# Patient Record
Sex: MALE | Race: WHITE | NOT HISPANIC OR LATINO | Employment: OTHER | URBAN - METROPOLITAN AREA
[De-identification: names, ages, dates, MRNs, and addresses within clinical notes are randomized per-mention and may not be internally consistent; named-entity substitution may affect disease eponyms.]

---

## 2018-05-17 ENCOUNTER — OFFICE VISIT (OUTPATIENT)
Dept: INTERNAL MEDICINE | Facility: CLINIC | Age: 68
End: 2018-05-17

## 2018-05-17 VITALS
DIASTOLIC BLOOD PRESSURE: 100 MMHG | OXYGEN SATURATION: 98 % | HEART RATE: 93 BPM | SYSTOLIC BLOOD PRESSURE: 138 MMHG | WEIGHT: 151 LBS

## 2018-05-17 DIAGNOSIS — Z00.00 ANNUAL PHYSICAL EXAM: ICD-10-CM

## 2018-05-17 DIAGNOSIS — F10.20 ALCOHOLISM (HCC): Chronic | ICD-10-CM

## 2018-05-17 DIAGNOSIS — M48.062 SPINAL STENOSIS OF LUMBAR REGION WITH NEUROGENIC CLAUDICATION: ICD-10-CM

## 2018-05-17 DIAGNOSIS — I10 ESSENTIAL HYPERTENSION: Primary | ICD-10-CM

## 2018-05-17 PROCEDURE — 99214 OFFICE O/P EST MOD 30 MIN: CPT | Performed by: INTERNAL MEDICINE

## 2018-05-17 RX ORDER — LISINOPRIL 40 MG/1
40 TABLET ORAL
COMMUNITY
Start: 2017-08-14 | End: 2018-05-18 | Stop reason: SDUPTHER

## 2018-05-17 RX ORDER — AMLODIPINE BESYLATE 10 MG/1
10 TABLET ORAL
COMMUNITY
Start: 2018-02-01 | End: 2018-05-18 | Stop reason: SDUPTHER

## 2018-05-17 NOTE — PROGRESS NOTES
Subjective   Jose Villafuerte is a 68 y.o. male.     History of Present Illness     The following portions of the patient's history were reviewed and updated as appropriate: allergies, current medications, past family history, past medical history, past social history, past surgical history and problem list.  67 yo/m with alcaholism, HTN, former smoker, with chronic low back pain here for an introductory PE. He is still drinking 12 beers per day but he has quit smoking.  Review of Systems   Constitutional: Negative.    HENT: Negative.    Eyes: Negative.    Respiratory: Negative.    Cardiovascular: Negative.    Gastrointestinal: Negative.    Endocrine: Negative.    Genitourinary: Negative.    Musculoskeletal: Positive for arthralgias and back pain.   Skin: Negative.    Allergic/Immunologic: Negative.    Neurological: Negative.    Hematological: Negative.    Psychiatric/Behavioral: Negative.        Objective   Physical Exam   Constitutional: He is oriented to person, place, and time. He appears well-developed and well-nourished.   HENT:   Head: Normocephalic and atraumatic.   Eyes: EOM are normal. Pupils are equal, round, and reactive to light.   Neck: Normal range of motion. Neck supple.   Cardiovascular: Normal rate, regular rhythm and normal heart sounds.    Pulmonary/Chest: Effort normal and breath sounds normal.   Abdominal: Soft. Bowel sounds are normal.   Musculoskeletal:   Chronic low back pain with loss of lumbar curvature   Neurological: He is alert and oriented to person, place, and time.   Skin: Skin is warm and dry.   Psychiatric: He has a normal mood and affect. His behavior is normal. Judgment and thought content normal.   Nursing note and vitals reviewed.        Assessment/Plan   Jose was seen today for establish care.    Diagnoses and all orders for this visit:    Essential hypertension  Comments:  poorly controlled at present, will consider adjusting medication    Spinal stenosis of lumbar region  with neurogenic claudication  Comments:  working with a neurosurgeon    Cigarette nicotine dependence without complication  Comments:  unmotivated to quit    Alcoholism  Comments:  drinking less    Annual physical exam  -     Comprehensive Metabolic Panel; Future  -     CBC & Differential; Future  -     Lipid Panel; Future  -     TSH; Future

## 2018-05-18 ENCOUNTER — TELEPHONE (OUTPATIENT)
Dept: INTERNAL MEDICINE | Facility: CLINIC | Age: 68
End: 2018-05-18

## 2018-05-18 DIAGNOSIS — I10 ESSENTIAL HYPERTENSION: Primary | ICD-10-CM

## 2018-05-18 RX ORDER — LISINOPRIL 40 MG/1
40 TABLET ORAL DAILY
Qty: 30 TABLET | Refills: 3 | Status: SHIPPED | OUTPATIENT
Start: 2018-05-18 | End: 2018-08-17 | Stop reason: SDUPTHER

## 2018-05-18 RX ORDER — AMLODIPINE BESYLATE 10 MG/1
10 TABLET ORAL DAILY
Qty: 30 TABLET | Refills: 3 | Status: SHIPPED | OUTPATIENT
Start: 2018-05-18 | End: 2018-11-14

## 2018-05-18 NOTE — TELEPHONE ENCOUNTER
----- Message from Adrienne Andujar sent at 5/18/2018  1:37 PM EDT -----  Contact: Patient  Patient requesting refill on    lisinopril (PRINIVIL,ZESTRIL) 40 MG tablet  amLODIPine (NORVASC) 10 MG tablet    Patient:[phone]    Pharmacy:[place] [address] [address] AT [facility] [address] [facility] - [phone] [facility] [phone]

## 2018-05-23 ENCOUNTER — LAB (OUTPATIENT)
Dept: INTERNAL MEDICINE | Facility: CLINIC | Age: 68
End: 2018-05-23

## 2018-05-23 DIAGNOSIS — Z00.00 ANNUAL PHYSICAL EXAM: ICD-10-CM

## 2018-05-23 LAB
ALBUMIN SERPL-MCNC: 4.4 G/DL (ref 3.5–5.2)
ALBUMIN/GLOB SERPL: 1.3 G/DL
ALP SERPL-CCNC: 80 U/L (ref 39–117)
ALT SERPL W P-5'-P-CCNC: 29 U/L (ref 1–41)
ANION GAP SERPL CALCULATED.3IONS-SCNC: 15.4 MMOL/L
AST SERPL-CCNC: 26 U/L (ref 1–40)
BASOPHILS # BLD AUTO: 0.02 10*3/MM3 (ref 0–0.2)
BASOPHILS NFR BLD AUTO: 0.2 % (ref 0–2)
BILIRUB SERPL-MCNC: 0.5 MG/DL (ref 0.1–1.2)
BUN BLD-MCNC: 8 MG/DL (ref 8–23)
BUN/CREAT SERPL: 12.3 (ref 7–25)
CALCIUM SPEC-SCNC: 9.5 MG/DL (ref 8.6–10.5)
CHLORIDE SERPL-SCNC: 92 MMOL/L (ref 98–107)
CHOLEST SERPL-MCNC: 192 MG/DL (ref 0–200)
CO2 SERPL-SCNC: 23.6 MMOL/L (ref 22–29)
CREAT BLD-MCNC: 0.65 MG/DL (ref 0.76–1.27)
DEPRECATED RDW RBC AUTO: 44.2 FL (ref 37–54)
EOSINOPHIL # BLD AUTO: 0.05 10*3/MM3 (ref 0–0.7)
EOSINOPHIL NFR BLD AUTO: 0.5 % (ref 0–5)
ERYTHROCYTE [DISTWIDTH] IN BLOOD BY AUTOMATED COUNT: 12.4 % (ref 11.5–15)
GFR SERPL CREATININE-BSD FRML MDRD: 122 ML/MIN/1.73
GLOBULIN UR ELPH-MCNC: 3.3 GM/DL
GLUCOSE BLD-MCNC: 79 MG/DL (ref 65–99)
HCT VFR BLD AUTO: 38.1 % (ref 40.1–51)
HDLC SERPL-MCNC: 81 MG/DL (ref 40–60)
HGB BLD-MCNC: 13.5 G/DL (ref 13.7–17.5)
LDLC SERPL CALC-MCNC: 101 MG/DL (ref 0–100)
LDLC/HDLC SERPL: 1.25 {RATIO}
LYMPHOCYTES # BLD AUTO: 1.64 10*3/MM3 (ref 0.8–7)
LYMPHOCYTES NFR BLD AUTO: 14.9 % (ref 10–60)
MCH RBC QN AUTO: 35.7 PG (ref 26–34)
MCHC RBC AUTO-ENTMCNC: 35.4 G/DL (ref 31–37)
MCV RBC AUTO: 100.8 FL (ref 80–100)
MONOCYTES # BLD AUTO: 1.45 10*3/MM3 (ref 0–1)
MONOCYTES NFR BLD AUTO: 13.1 % (ref 0–13)
NEUTROPHILS # BLD AUTO: 7.88 10*3/MM3 (ref 1–11)
NEUTROPHILS NFR BLD AUTO: 71.3 % (ref 30–85)
PLATELET # BLD AUTO: 388 10*3/MM3 (ref 150–450)
PMV BLD AUTO: 9.2 FL (ref 6–12)
POTASSIUM BLD-SCNC: 4.3 MMOL/L (ref 3.5–5.2)
PROT SERPL-MCNC: 7.7 G/DL (ref 6–8.5)
RBC # BLD AUTO: 3.78 10*6/MM3 (ref 4.63–6.08)
SODIUM BLD-SCNC: 131 MMOL/L (ref 136–145)
TRIGL SERPL-MCNC: 48 MG/DL (ref 0–150)
TSH SERPL-ACNC: 2.8 MIU/ML (ref 0.27–4.2)
VLDLC SERPL-MCNC: 9.6 MG/DL (ref 5–40)
WBC NRBC COR # BLD: 11.04 10*3/MM3 (ref 5–10)

## 2018-05-23 PROCEDURE — 80053 COMPREHEN METABOLIC PANEL: CPT | Performed by: INTERNAL MEDICINE

## 2018-05-23 PROCEDURE — 85025 COMPLETE CBC W/AUTO DIFF WBC: CPT | Performed by: INTERNAL MEDICINE

## 2018-05-23 PROCEDURE — 80061 LIPID PANEL: CPT | Performed by: INTERNAL MEDICINE

## 2018-05-24 DIAGNOSIS — E87.1 HYPONATREMIA: Primary | ICD-10-CM

## 2018-07-10 ENCOUNTER — LAB (OUTPATIENT)
Dept: INTERNAL MEDICINE | Facility: CLINIC | Age: 68
End: 2018-07-10

## 2018-07-10 DIAGNOSIS — E87.1 HYPONATREMIA: ICD-10-CM

## 2018-07-10 LAB
ALBUMIN SERPL-MCNC: 4.7 G/DL (ref 3.5–5.2)
ALBUMIN/GLOB SERPL: 1.6 G/DL
ALP SERPL-CCNC: 90 U/L (ref 39–117)
ALT SERPL-CCNC: 32 U/L (ref 1–41)
AST SERPL-CCNC: 38 U/L (ref 1–40)
BILIRUB SERPL-MCNC: 0.3 MG/DL (ref 0.1–1.2)
BUN SERPL-MCNC: 5 MG/DL (ref 8–23)
BUN/CREAT SERPL: 8.3 (ref 7–25)
CALCIUM SERPL-MCNC: 8.9 MG/DL (ref 8.6–10.5)
CHLORIDE SERPL-SCNC: 93 MMOL/L (ref 98–107)
CO2 SERPL-SCNC: 21.6 MMOL/L (ref 22–29)
CREAT SERPL-MCNC: 0.6 MG/DL (ref 0.76–1.27)
GLOBULIN SER CALC-MCNC: 3 GM/DL
GLUCOSE SERPL-MCNC: 116 MG/DL (ref 65–99)
POTASSIUM SERPL-SCNC: 4.2 MMOL/L (ref 3.5–5.2)
PROT SERPL-MCNC: 7.7 G/DL (ref 6–8.5)
SODIUM SERPL-SCNC: 132 MMOL/L (ref 136–145)

## 2018-08-17 DIAGNOSIS — I10 ESSENTIAL HYPERTENSION: ICD-10-CM

## 2018-08-17 RX ORDER — LISINOPRIL 40 MG/1
TABLET ORAL
Qty: 90 TABLET | Refills: 2 | Status: SHIPPED | OUTPATIENT
Start: 2018-08-17 | End: 2018-11-16 | Stop reason: SDUPTHER

## 2018-11-16 ENCOUNTER — OFFICE VISIT (OUTPATIENT)
Dept: INTERNAL MEDICINE | Facility: CLINIC | Age: 68
End: 2018-11-16

## 2018-11-16 VITALS
HEIGHT: 68 IN | BODY MASS INDEX: 23.79 KG/M2 | WEIGHT: 157 LBS | DIASTOLIC BLOOD PRESSURE: 68 MMHG | SYSTOLIC BLOOD PRESSURE: 108 MMHG

## 2018-11-16 DIAGNOSIS — F10.20 ALCOHOLISM (HCC): Primary | ICD-10-CM

## 2018-11-16 DIAGNOSIS — M48.062 SPINAL STENOSIS OF LUMBAR REGION WITH NEUROGENIC CLAUDICATION: ICD-10-CM

## 2018-11-16 DIAGNOSIS — I10 ESSENTIAL HYPERTENSION: ICD-10-CM

## 2018-11-16 DIAGNOSIS — F17.210 CIGARETTE NICOTINE DEPENDENCE WITHOUT COMPLICATION: ICD-10-CM

## 2018-11-16 PROCEDURE — 99214 OFFICE O/P EST MOD 30 MIN: CPT | Performed by: INTERNAL MEDICINE

## 2018-11-16 RX ORDER — ERYTHROMYCIN 5 MG/G
OINTMENT OPHTHALMIC AS NEEDED
COMMUNITY
Start: 2018-08-16 | End: 2019-04-23

## 2018-11-16 RX ORDER — AMLODIPINE BESYLATE 10 MG/1
10 TABLET ORAL DAILY
Qty: 90 TABLET | Refills: 3 | Status: SHIPPED | OUTPATIENT
Start: 2018-11-16 | End: 2019-11-25 | Stop reason: SDUPTHER

## 2018-11-16 RX ORDER — AMLODIPINE BESYLATE 10 MG/1
10 TABLET ORAL
COMMUNITY
Start: 2018-02-01 | End: 2018-11-16 | Stop reason: SDUPTHER

## 2018-11-16 RX ORDER — HEPATITIS A VACCINE 1440 [IU]/ML
INJECTION, SUSPENSION INTRAMUSCULAR
COMMUNITY
Start: 2018-10-09 | End: 2018-12-04

## 2018-11-16 RX ORDER — LISINOPRIL 40 MG/1
40 TABLET ORAL DAILY
Qty: 90 TABLET | Refills: 2 | Status: SHIPPED | OUTPATIENT
Start: 2018-11-16 | End: 2019-08-22 | Stop reason: SDUPTHER

## 2018-11-16 NOTE — PROGRESS NOTES
Subjective presents with a chief complaint of alcaholism, HTN, former smoker, with chronic low back pain here for a follow up exam. He is still drinking 12 beers per day but he has quit smoking.  Jose Villafuerte is a 68 y.o. male.     History of Present Illness     The following portions of the patient's history were reviewed and updated as appropriate: allergies, current medications, past family history, past medical history, past social history, past surgical history and problem list.    Review of Systems   Constitutional: Negative.    HENT: Negative.    Eyes: Negative.    Respiratory: Negative.    Cardiovascular: Negative.    Gastrointestinal: Negative.    Endocrine: Negative.    Genitourinary: Negative.    Musculoskeletal: Positive for arthralgias and back pain.   Skin: Negative.    Allergic/Immunologic: Negative.    Neurological: Negative.    Hematological: Negative.    Psychiatric/Behavioral: Negative.        Objective   Physical Exam   Constitutional: He is oriented to person, place, and time. He appears well-developed and well-nourished.   HENT:   Head: Normocephalic and atraumatic.   Eyes: EOM are normal. Pupils are equal, round, and reactive to light.   Neck: Normal range of motion.   Cardiovascular: Normal rate, regular rhythm and normal heart sounds.   Pulmonary/Chest: Effort normal and breath sounds normal.   Abdominal: Soft. Bowel sounds are normal.   Musculoskeletal: Normal range of motion.   Chronic low back pain   Neurological: He is alert and oriented to person, place, and time.   Skin: Skin is warm and dry.   Psychiatric: He has a normal mood and affect. His behavior is normal. Judgment and thought content normal.   Nursing note and vitals reviewed.        Assessment/Plan   Jose was seen today for follow-up and back pain.    Diagnoses and all orders for this visit:    Alcoholism (CMS/[facility])  Comments:  still an active drinker    Essential hypertension  Comments:  no changes  Orders:  -      lisinopril (PRINIVIL,ZESTRIL) 40 MG tablet; Take 1 tablet by mouth Daily.    Spinal stenosis of lumbar region with neurogenic claudication  Comments:  working with pain management    Cigarette nicotine dependence without complication  Comments:  unmotivated to quit    Essential hypertension  -     lisinopril (PRINIVIL,ZESTRIL) 40 MG tablet; Take 1 tablet by mouth Daily.    Other orders  -     amLODIPine (NORVASC) 10 MG tablet; Take 1 tablet by mouth Daily.

## 2018-11-29 ENCOUNTER — TELEPHONE (OUTPATIENT)
Dept: INTERNAL MEDICINE | Facility: CLINIC | Age: 68
End: 2018-11-29

## 2018-11-29 DIAGNOSIS — M54.50 CHRONIC BILATERAL LOW BACK PAIN WITHOUT SCIATICA: Primary | ICD-10-CM

## 2018-11-29 DIAGNOSIS — G89.29 CHRONIC BILATERAL LOW BACK PAIN WITHOUT SCIATICA: Primary | ICD-10-CM

## 2018-12-04 ENCOUNTER — OFFICE VISIT (OUTPATIENT)
Dept: ORTHOPEDIC SURGERY | Facility: CLINIC | Age: 68
End: 2018-12-04

## 2018-12-04 VITALS — WEIGHT: 156 LBS | TEMPERATURE: 98.4 F | BODY MASS INDEX: 23.64 KG/M2 | HEIGHT: 68 IN

## 2018-12-04 DIAGNOSIS — M48.061 SPINAL STENOSIS OF LUMBAR REGION, UNSPECIFIED WHETHER NEUROGENIC CLAUDICATION PRESENT: Primary | ICD-10-CM

## 2018-12-04 PROCEDURE — 99204 OFFICE O/P NEW MOD 45 MIN: CPT | Performed by: ORTHOPAEDIC SURGERY

## 2018-12-04 NOTE — H&P (VIEW-ONLY)
New patient or new problem visit    Chief Complaint   Patient presents with   • Lumbar Spine - Pain, Establish Care       HPI: He complains of pain in the left lower extremity as well as low back pain more leg than back pain.  Ongoing chronically at least a year moderate aching worse with activity.  About a year ago he quit curing his wallet on the left thinking it was from that.  Chiropractic and inversion table has helped along the way but epidural steroids have not.  He quit smoking 3 packs per year 3 years ago and states he no longer smokes he states his balance is okay no fever chills or weight loss recently.    PFSH: See chart- reviewed    Review of Systems   Constitutional: Negative for chills, fever and unexpected weight change.   HENT: Negative for trouble swallowing and voice change.    Eyes: Negative for visual disturbance.   Respiratory: Negative for cough and shortness of breath.    Cardiovascular: Negative for chest pain and leg swelling.   Gastrointestinal: Negative for abdominal pain, nausea and vomiting.   Endocrine: Negative for cold intolerance and heat intolerance.   Genitourinary: Negative for difficulty urinating, frequency and urgency.   Musculoskeletal: Positive for back pain.   Skin: Negative for rash and wound.   Allergic/Immunologic: Negative for immunocompromised state.   Neurological: Positive for numbness. Negative for weakness.   Hematological: Does not bruise/bleed easily.   Psychiatric/Behavioral: Negative for dysphoric mood. The patient is not nervous/anxious.        PE: Constitutional: Vital signs above-noted.  Awake, alert and oriented    Psychiatric: Affect and insight do not appear grossly disturbed.    Pulmonary: Breathing is unlabored, color is good.    Skin: Warm, dry and normal turgor    Cardiac:  pedal pulses intact.  No edema.    Eyesight and hearing appear adequate for examination purposes      Musculoskeletal:  There is minimal tenderness to percussion and palpation of  the spine. Motion appears somewhat stiff.  Posture is unremarkable to coronal and sagittal inspection.    The skin about the area is intact.  There is no palpable or visible deformity.  There is no local spasm.       Neurologic:   Reflexes are present in the right patellae absent on the left.   Motor function is undisturbed in quadriceps, EHL, and gastrocnemius      Sensation appears symmetrically intact to light touch   .  In the bilateral lower extremities there is no evidence of atrophy.   Clonus is absent..  Gait appears undisturbed.  SLR test negative      MEDICAL DECISION MAKING    XRAY: Plain film x-rays the lumbar spine shows slight scoliosis and multilevel disc degeneration.  MRI scan shows some right lumbosacral foraminal stenosis but looks pretty good otherwise I reviewed the radiologist's report with which I agree.    Other: n/a    Impression: Certainly looks like lumbar radiculopathy, likely cause would be stenosis.    Plan: I'm planning myelogram and CT scan of the lumbar spine for further evaluation.  The minimal risk was explained.  I will call him with results

## 2018-12-24 ENCOUNTER — HOSPITAL ENCOUNTER (OUTPATIENT)
Dept: CT IMAGING | Facility: HOSPITAL | Age: 68
Discharge: HOME | End: 2018-12-24
Attending: ORTHOPAEDIC SURGERY | Admitting: ORTHOPAEDIC SURGERY

## 2018-12-24 ENCOUNTER — HOSPITAL ENCOUNTER (OUTPATIENT)
Dept: GENERAL RADIOLOGY | Facility: HOSPITAL | Age: 68
Discharge: HOME | End: 2018-12-24
Attending: ORTHOPAEDIC SURGERY

## 2018-12-24 VITALS
HEART RATE: 78 BPM | RESPIRATION RATE: 16 BRPM | OXYGEN SATURATION: 98 % | BODY MASS INDEX: 22.73 KG/M2 | DIASTOLIC BLOOD PRESSURE: 75 MMHG | SYSTOLIC BLOOD PRESSURE: 129 MMHG | WEIGHT: 150 LBS | TEMPERATURE: 97.1 F | HEIGHT: 68 IN

## 2018-12-24 DIAGNOSIS — M48.061 SPINAL STENOSIS OF LUMBAR REGION, UNSPECIFIED WHETHER NEUROGENIC CLAUDICATION PRESENT: ICD-10-CM

## 2018-12-24 PROCEDURE — 72240 MYELOGRAPHY NECK SPINE: CPT

## 2018-12-24 PROCEDURE — 0 IOPAMIDOL 41 % SOLUTION: Performed by: RADIOLOGY

## 2018-12-24 PROCEDURE — 62304 MYELOGRAPHY LUMBAR INJECTION: CPT

## 2018-12-24 PROCEDURE — 72131 CT LUMBAR SPINE W/O DYE: CPT

## 2018-12-24 RX ORDER — LIDOCAINE HYDROCHLORIDE 10 MG/ML
10 INJECTION, SOLUTION INFILTRATION; PERINEURAL ONCE
Status: COMPLETED | OUTPATIENT
Start: 2018-12-24 | End: 2018-12-24

## 2018-12-24 RX ADMIN — LIDOCAINE HYDROCHLORIDE 3 ML: 10 INJECTION, SOLUTION INFILTRATION; PERINEURAL at 08:40

## 2018-12-24 RX ADMIN — IOPAMIDOL 20 ML: 408 INJECTION, SOLUTION INTRATHECAL at 08:44

## 2018-12-24 NOTE — DISCHARGE INSTRUCTIONS
EDUCATION /DISCHARGE INSTRUCTIONS:  A myelogram is a special radiology procedure of the spinal cord, spinal nerves and other related structures.  You will be awake during the examination.  An area of your lower back will be cleansed with an antiseptic solution.  The physician will inject a numbing medication in your lower back.  While your back is numb, a needle will be placed in the lower back area.  A small amount of spinal fluid may be withdrawn and sent to the lab if ordered by your physician. While the needle is in the back, an injection of a contrast material (xray dye) will be given through the needle.  The contrast material will allow the physician to see the spinal cord and spinal nerves.  Once injected, the needle will be removed and a band aid will be placed over the injection site.  The table will be tilted during the process to allow the contrast material to flow to particular areas in the spine.  Following the injection and xrays, you will be taken to the CT scan where more pictures will be taken. After the procedure is finished, the contrast material will be absorbed by your body and eliminated through your kidneys.  The radiologist will study and interpret your myelogram and send the results to your physician.    Procedure risks of a myelogram include, but are not limited to:  *  Bleeding   *  seizure  *  Infection   *  Headache, possibly severe requiring  *  Contrast reaction      a blood patch  *  Nerve or cord injury  *  Paralysis and death    Benefits of the procedure:  *  Best examination for delineating pathology related to spinal cord compression from a disc and/or nerve root compression    Alternatives to the procedure:  MRI - a non invasive procedure requiring intravenous contrast injection.  Cannot be done on patients with certain pacemakers or metal in the body.  MRI risks include possible reaction to the contrast material, movement of metal located in the body.  Benefit to MRI:   Non-invasive and usually painless procedure.  THIS EDUCATION INFORMATION WAS REVIEWED PRIOR TO THE PROCEDURE AND CONSENT. Patient initials __________________Time___0823______________    Important information following your myelogram:  *  Lie down with your head elevated no more than 2 pillows for the next 24 hours.   *  Sit up in the car going home.  Sit up to eat and use the restroom only,  for 24 hours.  *  24 HOURS COMPLETE AT _____1100___________________   *  Tomorrow, after 24 hours complete, take it easy and rest.  *  Do not drive for 48 hours following a myelogram  *  You may remove the bandage and shower in the morning  *  Increase your fluids for the next 24 hours.  Caffeinated drinks are encouraged.   Resume taking your blood thinner or Aspirin on __12/26/18_  CALL YOUR PHYSICIAN FOR THE FOLLOWING:  * Pain at the injection site  * Reddness, swelling, bruising or drainage at the injection site  * A fever by mouth of 101.0  * Any new symptoms  If you have problems with a headache that is not relieved with rest and medication, please call the Radiology Triage Nurse desk  [phone]

## 2019-01-02 ENCOUNTER — TELEPHONE (OUTPATIENT)
Dept: ORTHOPEDIC SURGERY | Facility: CLINIC | Age: 69
End: 2019-01-02

## 2019-01-02 DIAGNOSIS — M79.605 PAIN IN BOTH LOWER EXTREMITIES: Primary | ICD-10-CM

## 2019-01-02 DIAGNOSIS — M79.604 PAIN IN BOTH LOWER EXTREMITIES: Primary | ICD-10-CM

## 2019-01-04 NOTE — TELEPHONE ENCOUNTER
Pt called back and was informed of BLAIR's message.  I have placed an order for him to see Dr Silver and he is aware someone will call him regarding the appt.

## 2019-02-11 ENCOUNTER — TRANSCRIBE ORDERS (OUTPATIENT)
Dept: ADMINISTRATIVE | Facility: HOSPITAL | Age: 69
End: 2019-02-11

## 2019-02-11 DIAGNOSIS — I73.9 PAD (PERIPHERAL ARTERY DISEASE) (HCC): Primary | ICD-10-CM

## 2019-03-04 ENCOUNTER — HOSPITAL ENCOUNTER (OUTPATIENT)
Dept: CARDIOLOGY | Facility: HOSPITAL | Age: 69
Discharge: HOME | End: 2019-03-04
Admitting: SURGERY

## 2019-03-04 DIAGNOSIS — I73.9 PAD (PERIPHERAL ARTERY DISEASE) (HCC): ICD-10-CM

## 2019-03-04 LAB
BH CV LOWER ARTERIAL LEFT ABI RATIO: 0.95
BH CV LOWER ARTERIAL LEFT DORSALIS PEDIS SYS MAX: 141 MMHG
BH CV LOWER ARTERIAL LEFT GREAT TOE SYS MAX: 139 MMHG
BH CV LOWER ARTERIAL LEFT POST EX ABI RATIO: 0.8
BH CV LOWER ARTERIAL LEFT POST TIBIAL SYS MAX: 145 MMHG
BH CV LOWER ARTERIAL LEFT TBI RATIO: 0.91
BH CV LOWER ARTERIAL RIGHT ABI RATIO: 1.12
BH CV LOWER ARTERIAL RIGHT DORSALIS PEDIS SYS MAX: 165 MMHG
BH CV LOWER ARTERIAL RIGHT GREAT TOE SYS MAX: 167 MMHG
BH CV LOWER ARTERIAL RIGHT POST EX ABI RATIO: 1.06
BH CV LOWER ARTERIAL RIGHT POST TIBIAL SYS MAX: 171 MMHG
BH CV LOWER ARTERIAL RIGHT TBI RATIO: 1.09
UPPER ARTERIAL LEFT ARM BRACHIAL SYS MAX: 152 MMHG
UPPER ARTERIAL RIGHT ARM BRACHIAL SYS MAX: 153 MMHG

## 2019-03-04 PROCEDURE — 93924 LWR XTR VASC STDY BILAT: CPT

## 2019-03-05 ENCOUNTER — TELEPHONE (OUTPATIENT)
Dept: ORTHOPEDIC SURGERY | Facility: CLINIC | Age: 69
End: 2019-03-05

## 2019-03-05 DIAGNOSIS — M54.17 RADICULOPATHY OF LUMBOSACRAL REGION: Primary | ICD-10-CM

## 2019-03-05 NOTE — TELEPHONE ENCOUNTER
Patient would like to speak with EMELIAGW regarding the vascular ultrasound done on 3/4. Patient wanted to scheldue a f/u appt. But first available is 4/2.

## 2019-03-06 NOTE — TELEPHONE ENCOUNTER
Call returned to the patient.  His noninvasive arterial study showed no digital ischemia.  Myelogram post myelogram CT does show some multilevel degenerative disc disease with some mild spinal spinal stenosis.  Patient has tried epidurals in the past with no relief.  Does have a history of chronic smoking which he is just recently quit and alcohol abuse.  Patient continues to have low back pain that radiates into his left buttocks and down the posterior lateral aspect of the left leg to the calf.  He is not able to distinguish whether or not he has any numbness or tingling.  Because of his past history of alcohol abuse will have concerns of possible peripheral neuropathy.  Will send him for an EMG of that left lower extremity to rule out neuropathy per DARLING CRUZ all this is been discussed with the patient and he understands and agrees to proceed

## 2019-03-26 ENCOUNTER — HOSPITAL ENCOUNTER (OUTPATIENT)
Dept: INFUSION THERAPY | Facility: HOSPITAL | Age: 69
Discharge: HOME | End: 2019-03-26
Admitting: PSYCHIATRY & NEUROLOGY

## 2019-03-26 DIAGNOSIS — M54.17 RADICULOPATHY OF LUMBOSACRAL REGION: ICD-10-CM

## 2019-03-26 PROCEDURE — 95907 NVR CNDJ TST 1-2 STUDIES: CPT

## 2019-03-26 PROCEDURE — 95886 MUSC TEST DONE W/N TEST COMP: CPT

## 2019-03-26 PROCEDURE — 95886 MUSC TEST DONE W/N TEST COMP: CPT | Performed by: PSYCHIATRY & NEUROLOGY

## 2019-03-26 PROCEDURE — 95907 NVR CNDJ TST 1-2 STUDIES: CPT | Performed by: PSYCHIATRY & NEUROLOGY

## 2019-03-26 NOTE — PROCEDURES
EMG REPORT    Indication: Pain and numbness of the left lower extremity    Findings: Left peroneal motor study showed normal distal latency velocity and amplitude.  Left tibial motor study showed normal distal latency amplitude and F-wave latency.    Concentric needle EMG of the left vastus lateralis, vastus medialis, anterior tibialis, peroneus, gastrocnemius muscles showed no abnormality.    Impression: Normal EMG and nerve conduction studies left lower extremity.       Demetrius Whitaker M.D.

## 2019-03-27 ENCOUNTER — TELEPHONE (OUTPATIENT)
Dept: ORTHOPEDIC SURGERY | Facility: CLINIC | Age: 69
End: 2019-03-27

## 2019-03-27 DIAGNOSIS — M48.061 SPINAL STENOSIS OF LUMBAR REGION, UNSPECIFIED WHETHER NEUROGENIC CLAUDICATION PRESENT: ICD-10-CM

## 2019-03-27 DIAGNOSIS — M79.605 PAIN IN BOTH LOWER EXTREMITIES: Primary | ICD-10-CM

## 2019-03-27 DIAGNOSIS — M79.604 PAIN IN BOTH LOWER EXTREMITIES: Primary | ICD-10-CM

## 2019-03-27 NOTE — TELEPHONE ENCOUNTER
Spoke with patient and informed him BLARI would like for him to see Dr Mackey for a 2nd opinion.  I have placed the order and he is aware someone will call him to set up the appointment.

## 2019-03-27 NOTE — TELEPHONE ENCOUNTER
----- Message from Ronni Hinton MD sent at 3/26/2019  5:13 PM EDT -----  Sure, send him to Lucian Mackey, but let them know we do not need to overbook this  ----- Message -----  From: Jolanta Gutierrez MA  Sent: 3/26/2019   4:44 PM  To: Ronni Hinton MD    Patient was informed of results and he wants to know if Dr Hinton will send him to someone for a 2nd opinion (he would like to stay in the [facility] system if possible).

## 2019-04-10 NOTE — PROGRESS NOTES
Subjective   Patient ID: Jose Villafuerte is a 69 y.o. male is being seen for consultation today at the request of Ronni Hinton MD for back pain that radiates into his left lower extremity with numbness.    History of Present Illness    This patient has been having severe pain in his back with radiation down his left leg for about 4 or 5 years.  He has undergone multiple epidural blocks, physical therapy, as well as medications.  None of these have helped long-term.  The pain is sharp and stabbing and quite severe.  It is located primarily in the left side of the lower back with radiation into his left buttock and down his posterior lateral thigh and posterior lateral calf.  It does not really go into his foot.  He has no difficulty with bowel or bladder control or other associated symptoms.  Any kind of activity makes the pain worse.    The following portions of the patient's history were reviewed and updated as appropriate: allergies, current medications, past family history, past medical history, past social history, past surgical history and problem list.    Review of Systems   Constitutional: Positive for activity change.   Respiratory: Negative for chest tightness and shortness of breath.    Cardiovascular: Negative for chest pain.   Musculoskeletal: Positive for arthralgias, back pain, gait problem and myalgias.        Left lower extremity pain   Neurological: Positive for numbness.   All other systems reviewed and are negative.      Objective   Physical Exam   Constitutional: He is oriented to person, place, and time. He appears well-developed and well-nourished.   HENT:   Head: Normocephalic and atraumatic.   Eyes: Conjunctivae and EOM are normal. Pupils are equal, round, and reactive to light.   Fundoscopic exam:       The right eye shows no papilledema. The right eye shows venous pulsations.        The left eye shows no papilledema. The left eye shows venous pulsations.   Neck: Carotid bruit is not  present.   Neurological: He is oriented to person, place, and time. He has a normal Finger-Nose-Finger Test and a normal Heel to Shin Test. Gait normal.   Reflex Scores:       Tricep reflexes are 2+ on the right side and 2+ on the left side.       Bicep reflexes are 2+ on the right side and 2+ on the left side.       Brachioradialis reflexes are 2+ on the right side and 2+ on the left side.       Patellar reflexes are 2+ on the right side and 2+ on the left side.       Achilles reflexes are 2+ on the right side and 2+ on the left side.  Psychiatric: His speech is normal.     Neurologic Exam     Mental Status   Oriented to person, place, and time.   Registration of memory: Good recent and remote memory.   Attention: normal. Concentration: normal.   Speech: speech is normal   Level of consciousness: alert  Knowledge: consistent with education.     Cranial Nerves     CN II   Visual fields full to confrontation.   Visual acuity: normal    CN III, IV, VI   Pupils are equal, round, and reactive to light.  Extraocular motions are normal.     CN V   Facial sensation intact.   Right corneal reflex: normal  Left corneal reflex: normal    CN VII   Facial expression full, symmetric.   Right facial weakness: none  Left facial weakness: none    CN VIII   Hearing: intact    CN IX, X   Palate: symmetric    CN XI   Right sternocleidomastoid strength: normal  Left sternocleidomastoid strength: normal    CN XII   Tongue: not atrophic  Tongue deviation: none    Motor Exam   Muscle bulk: normal  Right arm tone: normal  Left arm tone: normal  Right leg tone: normal  Left leg tone: normal    Strength   Strength 5/5 except as noted.     Sensory Exam   Light touch normal.     Gait, Coordination, and Reflexes     Gait  Gait: normal    Coordination   Finger to nose coordination: normal  Heel to shin coordination: normal    Reflexes   Right brachioradialis: 2+  Left brachioradialis: 2+  Right biceps: 2+  Left biceps: 2+  Right triceps:  2+  Left triceps: 2+  Right patellar: 2+  Left patellar: 2+  Right achilles: 2+  Left achilles: 2+  Right : 2+  Left : 2+      Assessment/Plan   Independent Review of Radiographic Studies:      I reviewed a myelogram and CT scan done in December of this past year.  This shows some narrowing at the L5-S1 level.  This begins just below the L5 pedicle on both sides.  On the post myelographic CT scan the lower thoracic spine down to L3 looks okay.  There is some stenosis at L3-4 but not severe and a little bit at L4-5 but again not severe.  There is more significant lateral recess stenosis at L5-S1 but it is still not severe.  There is a compression fracture of L1.  This appears to be old.  The radiologist did not feel there was any stenosis at L5-S1 but I would disagree with this.    Medical Decision Making:      I told the patient about the test.  I explained that the stenosis at L5-S1 is not severe but it is definitely present.  I told him that from my point of view I would consider a left L5-S1 laminectomy with minimally invasive techniques but only if he feels the problem is so severe that he cannot live with it.  I also explained to him that he needs to understand his chances of a good result will be markedly reduced simply because the pressure on the nerve is not all that bad.  I told the patient about the risks, complications and expected outcome of the lumbar surgery.  I explained that there was an 80% chance of getting rid of the pain in the leg.  I explained that there would still be back pain after the surgery.  Initially this will be quite severe but will improve over time.  There is a 2 or 3% chance of infection, bleeding, CSF leak, damage to the nerve as a result of surgery, paralysis, as well as anesthetic risk.  There is a 5% chance of late instability which could require a fusion later on and a 10% chance of recurrent disc herniation.  We discussed the postoperative hospital and home course.   He does understand that the chance of a good result is reduced and does wish to proceed.    He will need to be scheduled for a: Left L5-S1 laminectomy, foraminotomies and medial facetectomy with metrx    Jose was seen today for back pain.    Diagnoses and all orders for this visit:    Spinal stenosis of lumbar region with neurogenic claudication  -     Case Request; Standing  -     ceFAZolin (ANCEF) 2 g in sodium chloride 0.9 % 100 mL IVPB  -     Case Request    Other orders  -     Follow anesthesia standing orders.  -     Obtain informed consent  -     Provide NPO Instructions to Patient; Future  -     Follow anesthesia standing orders.; Standing  -     SCD (sequential compression device)- to be placed on patient in Pre-op; Standing      Return for 2-3 week post op.

## 2019-04-11 ENCOUNTER — OFFICE VISIT (OUTPATIENT)
Dept: NEUROSURGERY | Facility: CLINIC | Age: 69
End: 2019-04-11

## 2019-04-11 VITALS
BODY MASS INDEX: 22.73 KG/M2 | DIASTOLIC BLOOD PRESSURE: 69 MMHG | SYSTOLIC BLOOD PRESSURE: 128 MMHG | HEIGHT: 68 IN | WEIGHT: 150 LBS | HEART RATE: 73 BPM

## 2019-04-11 DIAGNOSIS — M48.062 SPINAL STENOSIS OF LUMBAR REGION WITH NEUROGENIC CLAUDICATION: Primary | ICD-10-CM

## 2019-04-11 PROCEDURE — 99204 OFFICE O/P NEW MOD 45 MIN: CPT | Performed by: NEUROLOGICAL SURGERY

## 2019-04-11 RX ORDER — CEFAZOLIN SODIUM 2 G/100ML
2 INJECTION, SOLUTION INTRAVENOUS ONCE
Status: CANCELLED | OUTPATIENT
Start: 2019-05-03 | End: 2019-04-11

## 2019-04-23 ENCOUNTER — APPOINTMENT (OUTPATIENT)
Dept: PREADMISSION TESTING | Facility: HOSPITAL | Age: 69
End: 2019-04-23

## 2019-04-23 VITALS
HEIGHT: 68 IN | RESPIRATION RATE: 18 BRPM | TEMPERATURE: 97 F | SYSTOLIC BLOOD PRESSURE: 142 MMHG | WEIGHT: 150 LBS | OXYGEN SATURATION: 98 % | BODY MASS INDEX: 22.73 KG/M2 | DIASTOLIC BLOOD PRESSURE: 80 MMHG | HEART RATE: 67 BPM

## 2019-04-23 LAB
ANION GAP SERPL CALCULATED.3IONS-SCNC: 13.8 MMOL/L
BUN BLD-MCNC: 7 MG/DL (ref 8–23)
BUN/CREAT SERPL: 11.7 (ref 7–25)
CALCIUM SPEC-SCNC: 9 MG/DL (ref 8.6–10.5)
CHLORIDE SERPL-SCNC: 94 MMOL/L (ref 98–107)
CO2 SERPL-SCNC: 24.2 MMOL/L (ref 22–29)
CREAT BLD-MCNC: 0.6 MG/DL (ref 0.76–1.27)
DEPRECATED RDW RBC AUTO: 46.9 FL (ref 37–54)
ERYTHROCYTE [DISTWIDTH] IN BLOOD BY AUTOMATED COUNT: 12.4 % (ref 12.3–15.4)
GFR SERPL CREATININE-BSD FRML MDRD: 134 ML/MIN/1.73
GLUCOSE BLD-MCNC: 107 MG/DL (ref 65–99)
HCT VFR BLD AUTO: 38.5 % (ref 37.5–51)
HGB BLD-MCNC: 12.9 G/DL (ref 13–17.7)
INR PPP: 1.05 (ref 0.9–1.1)
MCH RBC QN AUTO: 33.9 PG (ref 26.6–33)
MCHC RBC AUTO-ENTMCNC: 33.5 G/DL (ref 31.5–35.7)
MCV RBC AUTO: 101.3 FL (ref 79–97)
PLATELET # BLD AUTO: 410 10*3/MM3 (ref 140–450)
PMV BLD AUTO: 9.1 FL (ref 6–12)
POTASSIUM BLD-SCNC: 4.2 MMOL/L (ref 3.5–5.2)
PROTHROMBIN TIME: 13.4 SECONDS (ref 11.7–14.2)
RBC # BLD AUTO: 3.8 10*6/MM3 (ref 4.14–5.8)
SODIUM BLD-SCNC: 132 MMOL/L (ref 136–145)
WBC NRBC COR # BLD: 8.39 10*3/MM3 (ref 3.4–10.8)

## 2019-04-23 PROCEDURE — 93005 ELECTROCARDIOGRAM TRACING: CPT

## 2019-04-23 PROCEDURE — 80048 BASIC METABOLIC PNL TOTAL CA: CPT | Performed by: NEUROLOGICAL SURGERY

## 2019-04-23 PROCEDURE — 93010 ELECTROCARDIOGRAM REPORT: CPT | Performed by: INTERNAL MEDICINE

## 2019-04-23 PROCEDURE — 85610 PROTHROMBIN TIME: CPT | Performed by: NEUROLOGICAL SURGERY

## 2019-04-23 PROCEDURE — 85027 COMPLETE CBC AUTOMATED: CPT | Performed by: NEUROLOGICAL SURGERY

## 2019-04-23 PROCEDURE — 36415 COLL VENOUS BLD VENIPUNCTURE: CPT

## 2019-04-23 RX ORDER — ERYTHROMYCIN 5 MG/G
1 OINTMENT OPHTHALMIC AS NEEDED
COMMUNITY
End: 2019-10-02

## 2019-04-23 NOTE — DISCHARGE INSTRUCTIONS
Take the following medications the morning of surgery with a small sip of water:        General Instructions:  • Do not eat solid food after midnight the night before surgery.  • You may drink clear liquids day of surgery but must stop at least one hour before your hospital arrival time.  • It is beneficial for you to have a clear drink that contains carbohydrates the day of surgery.  We suggest a 12 to 20 ounce bottle of Gatorade or Powerade for non-diabetic patients or a 12 to 20 ounce bottle of G2 or Powerade Zero for diabetic patients. (Pediatric patients, are not advised to drink a 12 to 20 ounce carbohydrate drink)    Clear liquids are liquids you can see through.  Nothing red in color.     Plain water                               Sports drinks  Sodas                                   Gelatin (Jell-O)  Fruit juices without pulp such as white grape juice and apple juice  Popsicles that contain no fruit or yogurt  Tea or coffee (no cream or milk added)  Gatorade / Powerade  G2 / Powerade Zero    • Infants may have breast milk up to four hours before surgery.  • Infants drinking formula may drink formula up to six hours before surgery.   • Patients who avoid smoking, chewing tobacco and alcohol for 4 weeks prior to surgery have a reduced risk of post-operative complications.  Quit smoking as many days before surgery as you can.  • Do not smoke, use chewing tobacco or drink alcohol the day of surgery.   • If applicable bring your C-PAP/ BI-PAP machine.  • Bring any papers given to you in the doctor’s office.  • Wear clean comfortable clothes and socks.  • Do not wear contact lenses, false eyelashes or make-up.  Bring a case for your glasses.   • Bring crutches or walker if applicable.  • Remove all piercings.  Leave jewelry and any other valuables at home.  • Hair extensions with metal clips must be removed prior to surgery.  • The Pre-Admission Testing nurse will instruct you to bring medications if unable to  obtain an accurate list in Pre-Admission Testing.        If you were given a blood bank ID arm band remember to bring it with you the day of surgery.    Preventing a Surgical Site Infection:  • For 2 to 3 days before surgery, avoid shaving with a razor because the razor can irritate skin and make it easier to develop an infection.    • Any areas of open skin can increase the risk of a post-operative wound infection by allowing bacteria to enter and travel throughout the body.  Notify your surgeon if you have any skin wounds / rashes even if it is not near the expected surgical site.  The area will need assessed to determine if surgery should be delayed until it is healed.  • The night prior to surgery sleep in a clean bed with clean clothing.  Do not allow pets to sleep with you.  • Shower on the morning of surgery using a fresh bar of anti-bacterial soap (such as Dial) and clean washcloth.  Dry with a clean towel and dress in clean clothing.  • Ask your surgeon if you will be receiving antibiotics prior to surgery.  • Make sure you, your family, and all healthcare providers clean their hands with soap and water or an alcohol based hand  before caring for you or your wound.    Day of surgery:  Upon arrival, a Pre-op nurse and Anesthesiologist will review your health history, obtain vital signs, and answer questions you may have.  The only belongings needed at this time will be your home medications and if applicable your C-PAP/BI-PAP machine.  If you are staying overnight your family can leave the rest of your belongings in the car and bring them to your room later.  A Pre-op nurse will start an IV and you may receive medication in preparation for surgery, including something to help you relax.  Your family will be able to see you in the Pre-op area.  While you are in surgery your family should notify the waiting room  if they leave the waiting room area and provide a contact phone  number.    Please be aware that surgery does come with discomfort.  We want to make every effort to control your discomfort so please discuss any uncontrolled symptoms with your nurse.   Your doctor will most likely have prescribed pain medications.      If you are going home after surgery you will receive individualized written care instructions before being discharged.  A responsible adult must drive you to and from the hospital on the day of your surgery and stay with you for 24 hours.    If you are staying overnight following surgery, you will be transported to your hospital room following the recovery period.  [facility] has all private rooms.    You have received a list of surgical assistants for your reference.  If you have any questions please call Pre-Admission Testing at [phone].  Deductibles and co-payments are collected on the day of service. Please be prepared to pay the required co-pay, deductible or deposit on the day of service as defined by your plan.

## 2019-05-03 ENCOUNTER — ANESTHESIA (OUTPATIENT)
Dept: PERIOP | Facility: HOSPITAL | Age: 69
End: 2019-05-03

## 2019-05-03 ENCOUNTER — HOSPITAL ENCOUNTER (OUTPATIENT)
Facility: HOSPITAL | Age: 69
Setting detail: HOSPITAL OUTPATIENT SURGERY
Discharge: HOME | End: 2019-05-03
Attending: NEUROLOGICAL SURGERY | Admitting: NEUROLOGICAL SURGERY

## 2019-05-03 ENCOUNTER — ANESTHESIA EVENT (OUTPATIENT)
Dept: PERIOP | Facility: HOSPITAL | Age: 69
End: 2019-05-03

## 2019-05-03 VITALS
OXYGEN SATURATION: 100 % | WEIGHT: 153 LBS | RESPIRATION RATE: 20 BRPM | SYSTOLIC BLOOD PRESSURE: 152 MMHG | DIASTOLIC BLOOD PRESSURE: 68 MMHG | HEART RATE: 97 BPM | BODY MASS INDEX: 23.26 KG/M2 | TEMPERATURE: 97.6 F

## 2019-05-03 DIAGNOSIS — M48.062 SPINAL STENOSIS OF LUMBAR REGION WITH NEUROGENIC CLAUDICATION: ICD-10-CM

## 2019-05-03 PROCEDURE — G0463 HOSPITAL OUTPT CLINIC VISIT: HCPCS | Performed by: NEUROLOGICAL SURGERY

## 2019-05-03 RX ORDER — MIDAZOLAM HYDROCHLORIDE 1 MG/ML
1 INJECTION INTRAMUSCULAR; INTRAVENOUS
Status: DISCONTINUED | OUTPATIENT
Start: 2019-05-03 | End: 2019-05-03 | Stop reason: HOSPADM

## 2019-05-03 RX ORDER — SODIUM CHLORIDE 0.9 % (FLUSH) 0.9 %
3-10 SYRINGE (ML) INJECTION AS NEEDED
Status: DISCONTINUED | OUTPATIENT
Start: 2019-05-03 | End: 2019-05-03 | Stop reason: HOSPADM

## 2019-05-03 RX ORDER — SODIUM CHLORIDE, SODIUM LACTATE, POTASSIUM CHLORIDE, CALCIUM CHLORIDE 600; 310; 30; 20 MG/100ML; MG/100ML; MG/100ML; MG/100ML
9 INJECTION, SOLUTION INTRAVENOUS CONTINUOUS
Status: DISCONTINUED | OUTPATIENT
Start: 2019-05-03 | End: 2019-05-03 | Stop reason: HOSPADM

## 2019-05-03 RX ORDER — SODIUM CHLORIDE 0.9 % (FLUSH) 0.9 %
3 SYRINGE (ML) INJECTION EVERY 12 HOURS SCHEDULED
Status: DISCONTINUED | OUTPATIENT
Start: 2019-05-03 | End: 2019-05-03 | Stop reason: HOSPADM

## 2019-05-03 RX ORDER — CEFAZOLIN SODIUM 2 G/100ML
2 INJECTION, SOLUTION INTRAVENOUS ONCE
Status: DISCONTINUED | OUTPATIENT
Start: 2019-05-03 | End: 2019-05-03 | Stop reason: HOSPADM

## 2019-05-03 RX ORDER — MIDAZOLAM HYDROCHLORIDE 1 MG/ML
2 INJECTION INTRAMUSCULAR; INTRAVENOUS
Status: DISCONTINUED | OUTPATIENT
Start: 2019-05-03 | End: 2019-05-03 | Stop reason: HOSPADM

## 2019-05-03 RX ORDER — FAMOTIDINE 10 MG/ML
20 INJECTION, SOLUTION INTRAVENOUS ONCE
Status: DISCONTINUED | OUTPATIENT
Start: 2019-05-03 | End: 2019-05-03 | Stop reason: HOSPADM

## 2019-05-03 NOTE — ANESTHESIA PREPROCEDURE EVALUATION
" Anesthesia Evaluation     no history of anesthetic complications:               Airway   Mallampati: II  TM distance: >3 FB  Neck ROM: full  Dental      Comment: Very loose lower front tooth, discussed possible loss of that tooth, \"will come out sometime anyway\"    Pulmonary    (+) a smoker (am productive cough) Former,   (-) shortness of breath, recent URI, sleep apnea  Cardiovascular     (+) hypertension,   (-) valvular problems/murmurs, dysrhythmias, angina, hyperlipidemia      Neuro/Psych  (-) dizziness/light headedness, syncope  GI/Hepatic/Renal/Endo    (-) liver disease, no renal disease, diabetes    Musculoskeletal     Abdominal    Substance History   (+) alcohol use (12beers a day),      OB/GYN          Other                        Anesthesia Plan    ASA 3     general     intravenous induction   Anesthetic plan, all risks, benefits, and alternatives have been provided, discussed and informed consent has been obtained with: patient.      "

## 2019-05-03 NOTE — NURSING NOTE
PT WAS CANCELLED R/T DRINKING APPROX 12 BEERS/DAY. DR. BARNHART WAS NOT AWARE OF THIS HISTORY. PT INSTRUCTED TO LET SURGEON'S OFFICE KNOW WHEN HE HAS QUIT DRINKING AND THEY WILL RESCHEDULE THE SURGERY.

## 2019-05-03 NOTE — INTERVAL H&P NOTE
H&P updated. The patient was examined and the following changes are noted:  In discussing with the patient his overall situation this morning he was completely unaware of what we are doing with surgery.  In addition upon further questioning he revealed that he drinks 8-12 beers a day every day.  His last alcohol consumption was just last night.  He said that it was in his chart and I explained that I had missed that in our initial discussion.  I told him that from my point of view he really needs to be completely off alcohol before we can consider doing surgery on him.  He said he will do that and call the office if he wishes to reschedule the surgery.  Will be discharged at this time.

## 2019-08-02 ENCOUNTER — TELEPHONE (OUTPATIENT)
Dept: ORTHOPEDIC SURGERY | Facility: CLINIC | Age: 69
End: 2019-08-02

## 2019-08-02 DIAGNOSIS — M48.061 SPINAL STENOSIS OF LUMBAR REGION, UNSPECIFIED WHETHER NEUROGENIC CLAUDICATION PRESENT: Primary | ICD-10-CM

## 2019-08-22 DIAGNOSIS — I10 ESSENTIAL HYPERTENSION: ICD-10-CM

## 2019-08-22 RX ORDER — LISINOPRIL 40 MG/1
TABLET ORAL
Qty: 90 TABLET | Refills: 1 | Status: SHIPPED | OUTPATIENT
Start: 2019-08-22 | End: 2019-11-25 | Stop reason: SDUPTHER

## 2019-08-29 ENCOUNTER — OFFICE VISIT (OUTPATIENT)
Dept: PAIN MEDICINE | Facility: CLINIC | Age: 69
End: 2019-08-29

## 2019-08-29 VITALS
DIASTOLIC BLOOD PRESSURE: 78 MMHG | TEMPERATURE: 98.2 F | RESPIRATION RATE: 20 BRPM | SYSTOLIC BLOOD PRESSURE: 143 MMHG | HEIGHT: 68 IN | WEIGHT: 156.6 LBS | OXYGEN SATURATION: 96 % | HEART RATE: 89 BPM | BODY MASS INDEX: 23.73 KG/M2

## 2019-08-29 DIAGNOSIS — M79.18 PIRIFORMIS MUSCLE PAIN: ICD-10-CM

## 2019-08-29 DIAGNOSIS — M48.062 SPINAL STENOSIS OF LUMBAR REGION WITH NEUROGENIC CLAUDICATION: ICD-10-CM

## 2019-08-29 DIAGNOSIS — G89.29 OTHER CHRONIC PAIN: Primary | ICD-10-CM

## 2019-08-29 PROCEDURE — 99204 OFFICE O/P NEW MOD 45 MIN: CPT | Performed by: NURSE PRACTITIONER

## 2019-08-29 NOTE — PATIENT INSTRUCTIONS
Piriformis Syndrome Rehab  Ask your health care provider which exercises are safe for you. Do exercises exactly as told by your health care provider and adjust them as directed. It is normal to feel mild stretching, pulling, tightness, or discomfort as you do these exercises, but you should stop right away if you feel sudden pain or your pain gets worse. Do not begin these exercises until told by your health care provider.  Stretching and range of motion exercises  These exercises warm up your muscles and joints and improve the movement and flexibility of your hip and pelvis. These exercises also help to relieve pain, numbness, and tingling.  Exercise A: Hip rotators    1. Lie on your back on a firm surface.  2. Pull your left / right knee toward your same shoulder with your left / right hand until your knee is pointing toward the ceiling. Hold your left / right ankle with your other hand.  3. Keeping your knee steady, gently pull your left / right ankle toward your other shoulder until you feel a stretch in your buttocks.  4. Hold this position for __________ seconds.  Repeat __________ times. Complete this stretch __________ times a day.  Exercise B: Hip extensors  1. Lie on your back on a firm surface. Both of your legs should be straight.  2. Pull your left / right knee to your chest. Hold your leg in this position by holding onto the back of your thigh or the front of your knee.  3. Hold this position for __________ seconds.  4. Slowly return to the starting position.  Repeat __________ times. Complete this stretch __________ times a day.  Strengthening exercises  These exercises build strength and endurance in your hip and thigh muscles. Endurance is the ability to use your muscles for a long time, even after they get tired.  Exercise C: Straight leg raises (hip abductors)    1. Lie on your side with your left / right leg in the top position. Lie so your head, shoulder, knee, and hip line up. Bend your bottom  knee to help you balance.  2. Lift your top leg up 4-6 inches (10-15 cm), keeping your toes pointed straight ahead.  3. Hold this position for __________ seconds.  4. Slowly lower your leg to the starting position. Let your muscles relax completely.  Repeat __________ times. Complete this exercise__________ times a day.  Exercise D: Hip abductors and rotators, quadruped    1. Get on your hands and knees on a firm, lightly padded surface. Your hands should be directly below your shoulders, and your knees should be directly below your hips.  2. Lift your left / right knee out to the side. Keep your knee bent. Do not twist your body.  3. Hold this position for __________ seconds.  4. Slowly lower your leg.  Repeat __________ times. Complete this exercise__________ times a day.  Exercise E: Straight leg raises (hip extensors)  1. Lie on your abdomen on a bed or a firm surface with a pillow under your hips.  2. Squeeze your buttock muscles and lift your left / right thigh off the bed. Do not let your back arch.  3. Hold this position for __________ seconds.  4. Slowly return to the starting position. Let your muscles relax completely before doing another repetition.  Repeat __________ times. Complete this exercise__________ times a day.  This information is not intended to replace advice given to you by your health care provider. Make sure you discuss any questions you have with your health care provider.  Document Released: 12/18/2006 Document Revised: 08/22/2017 Document Reviewed: 11/29/2016  [employer] Interactive Patient Education © 2019 [employer]  Piriformis Syndrome    Piriformis syndrome is a condition that can cause pain and numbness in your buttocks and down the back of your leg. Piriformis syndrome happens when the small muscle that connects the base of your spine to your hip (piriformis muscle) presses on the nerve that runs down the back of your leg (sciatic nerve).  The piriformis muscle helps your hip  rotate and helps to bring your leg back and out. It also helps shift your weight while you are walking to keep you stable. The sciatic nerve runs under or through the piriformis. Damage to the piriformis muscle can cause spasms that put pressure on the nerve below. This causes pain and discomfort while sitting and moving. The pain may feel as if it begins in the buttock and spreads (radiates) down your hip and thigh.  What are the causes?  This condition is caused by pressure on the sciatic nerve from the piriformis muscle. The piriformis muscle can get irritated with overuse, especially if other hip muscles are weak and the piriformis has to do extra work. Piriformis syndrome can also occur after an injury, like a fall onto your buttocks.  What increases the risk?  This condition is more likely to develop in:  · Women.  · People who sit for long periods of time.  · Cyclists.  · People who have weak buttocks muscles (gluteal muscles).  What are the signs or symptoms?  Pain, tingling, or numbness that starts in the buttock and runs down the back of your leg (sciatica) is the most common symptom of this condition. Your symptoms may:  · Get worse the longer you sit.  · Get worse when you walk, run, or go up on stairs.  How is this diagnosed?  This condition is diagnosed based on your symptoms, medical history, and physical exam. During this exam, your health care provider may move your leg into different positions to check for pain. He or she will also press on the muscles of your hip and buttock to see if that increases your symptoms. You may also have an X-ray or MRI.  How is this treated?  Treatment for this condition may include:  · Stopping all activities that cause pain or make your condition worse.  · Using heat or ice to relieve pain as told by your health care provider.  · Taking medicines to reduce pain and swelling.  · Taking a muscle relaxer to release the piriformis muscle.  · Doing range-of-motion and  strengthening exercises (physical therapy) as told by your health care provider.  · Massaging the affected area.  · Getting an injection of an anti-inflammatory medicine or muscle relaxer to reduce inflammation and muscle tension.  In rare cases, you may need surgery to cut the muscle and release pressure on the nerve if other treatments do not work.  Follow these instructions at home:  · Take over-the-counter and prescription medicines only as told by your health care provider.  · Do not sit for long periods. Get up and walk around every 20 minutes or as often as told by your health care provider.  · If directed, apply heat to the affected area as often as told by your health care provider. Use the heat source that your health care provider recommends, such as a moist heat pack or a heating pad.  ? Place a towel between your skin and the heat source.  ? Leave the heat on for 20-30 minutes.  ? Remove the heat if your skin turns bright red. This is especially important if you are unable to feel pain, heat, or cold. You may have a greater risk of getting burned.  · If directed, apply ice to the injured area.  ? Put ice in a plastic bag.  ? Place a towel between your skin and the bag.  ? Leave the ice on for 20 minutes, 2-3 times a day.  · Do exercises as told by your health care provider.  · Return to your normal activities as told by your health care provider. Ask your health care provider what activities are safe for you.  · Keep all follow-up visits as told by your health care provider. This is important.  How is this prevented?  · Do not sit for longer than 20 minutes at a time. When you sit, choose padded surfaces.  · Warm up and stretch before being active.  · Cool down and stretch after being active.  · Give your body time to rest between periods of activity.  · Make sure to use equipment that fits you.  · Maintain physical fitness, including:  ? Strength.  ? Flexibility.  Contact a health care provider  if:  · Your pain and stiffness continue or get worse.  · Your leg or hip becomes weak.  · You have changes in your bowel function or bladder function.  This information is not intended to replace advice given to you by your health care provider. Make sure you discuss any questions you have with your health care provider.  Document Released: 12/18/2006 Document Revised: 08/22/2017 Document Reviewed: 11/29/2016  [employer] Interactive Patient Education © 2019 [employer]
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
## 2019-09-26 ENCOUNTER — TELEPHONE (OUTPATIENT)
Dept: INTERNAL MEDICINE | Facility: CLINIC | Age: 69
End: 2019-09-26

## 2019-09-26 NOTE — TELEPHONE ENCOUNTER
----- Message from Leticia Niño sent at 9/25/2019  3:06 PM EDT -----  Contact: PT  Patient has not been seen since November of 2018. He called in today to ask for a referral to someone who can help him with his taste buds. He is having issues with his taste. Would you like to see him first or would you be ok referring him to someone? Please advise

## 2019-09-27 NOTE — TELEPHONE ENCOUNTER
Pt informed he needs OV for that in order of us to refer him. Advised pt to call us and make aleisha.

## 2019-10-02 ENCOUNTER — OFFICE VISIT (OUTPATIENT)
Dept: INTERNAL MEDICINE | Facility: CLINIC | Age: 69
End: 2019-10-02

## 2019-10-02 VITALS
OXYGEN SATURATION: 98 % | DIASTOLIC BLOOD PRESSURE: 72 MMHG | BODY MASS INDEX: 23.64 KG/M2 | SYSTOLIC BLOOD PRESSURE: 128 MMHG | HEART RATE: 80 BPM | HEIGHT: 68 IN | WEIGHT: 156 LBS

## 2019-10-02 DIAGNOSIS — D48.9 NEOPLASM OF UNCERTAIN BEHAVIOR: ICD-10-CM

## 2019-10-02 DIAGNOSIS — I10 ESSENTIAL HYPERTENSION: ICD-10-CM

## 2019-10-02 DIAGNOSIS — R43.2 LOSS OF TASTE: Primary | ICD-10-CM

## 2019-10-02 LAB
ALBUMIN SERPL-MCNC: 4.7 G/DL (ref 3.5–5.2)
ALBUMIN/GLOB SERPL: 1.4 G/DL
ALP SERPL-CCNC: 95 U/L (ref 39–117)
ALT SERPL-CCNC: 39 U/L (ref 1–41)
AST SERPL-CCNC: 53 U/L (ref 1–40)
BILIRUB SERPL-MCNC: 0.4 MG/DL (ref 0.2–1.2)
BUN SERPL-MCNC: 5 MG/DL (ref 8–23)
BUN/CREAT SERPL: 7.2 (ref 7–25)
CALCIUM SERPL-MCNC: 9.4 MG/DL (ref 8.6–10.5)
CHLORIDE SERPL-SCNC: 96 MMOL/L (ref 98–107)
CO2 SERPL-SCNC: 24.7 MMOL/L (ref 22–29)
CREAT SERPL-MCNC: 0.69 MG/DL (ref 0.76–1.27)
DEPRECATED RDW RBC AUTO: 42.3 FL (ref 37–54)
ERYTHROCYTE [DISTWIDTH] IN BLOOD BY AUTOMATED COUNT: 11.9 % (ref 12.3–15.4)
ERYTHROCYTE [SEDIMENTATION RATE] IN BLOOD: 10 MM/HR (ref 0–20)
GLOBULIN SER CALC-MCNC: 3.3 GM/DL
GLUCOSE SERPL-MCNC: 88 MG/DL (ref 65–99)
HCT VFR BLD AUTO: 40.6 % (ref 37.5–51)
HGB BLD-MCNC: 14.2 G/DL (ref 13–17.7)
MCH RBC QN AUTO: 34.8 PG (ref 26.6–33)
MCHC RBC AUTO-ENTMCNC: 35 G/DL (ref 31.5–35.7)
MCV RBC AUTO: 99.5 FL (ref 79–97)
PLATELET # BLD AUTO: 427 10*3/MM3 (ref 140–450)
PMV BLD AUTO: 9.2 FL (ref 6–12)
POTASSIUM SERPL-SCNC: 5.1 MMOL/L (ref 3.5–5.2)
PROT SERPL-MCNC: 8 G/DL (ref 6–8.5)
RBC # BLD AUTO: 4.08 10*6/MM3 (ref 4.14–5.8)
SODIUM SERPL-SCNC: 136 MMOL/L (ref 136–145)
WBC NRBC COR # BLD: 7.65 10*3/MM3 (ref 3.4–10.8)

## 2019-10-02 PROCEDURE — 99214 OFFICE O/P EST MOD 30 MIN: CPT | Performed by: NURSE PRACTITIONER

## 2019-10-02 PROCEDURE — 85027 COMPLETE CBC AUTOMATED: CPT | Performed by: NURSE PRACTITIONER

## 2019-10-02 PROCEDURE — 85651 RBC SED RATE NONAUTOMATED: CPT | Performed by: NURSE PRACTITIONER

## 2019-10-02 NOTE — PROGRESS NOTES
Subjective   Jose Villafuerte is a 69 y.o. male.     He reports to office due to loss of taste. He reports it has gradually gotten worst over the last year. He denies any night sweat, lymphadenopathy. He can smell just fine. He denies any allergy symptoms or reflux or indigestion. He denies any neck pain. He due to see dentist tomorrow. He has poor dental hygiene and due to have a couple teeth removed. .         The following portions of the patient's history were reviewed and updated as appropriate: allergies, current medications, past family history, past medical history, past social history, past surgical history and problem list.    Review of Systems   Constitutional: Negative for appetite change, chills, fatigue and fever.   HENT: Positive for dental problem. Negative for congestion, ear discharge, ear pain, facial swelling, hearing loss, postnasal drip, rhinorrhea, sinus pressure, sneezing, sore throat and tinnitus.         Loss of taste   Respiratory: Negative for cough, chest tightness, shortness of breath and wheezing.    Cardiovascular: Negative for chest pain, palpitations and leg swelling.   Gastrointestinal: Negative for abdominal pain, diarrhea, nausea and vomiting.   Musculoskeletal: Negative for neck pain and neck stiffness.   Allergic/Immunologic: Negative for environmental allergies.   Neurological: Negative for dizziness and headaches.   Hematological: Negative for adenopathy.       Objective   Physical Exam   Constitutional: He appears well-developed and well-nourished. He is cooperative. He does not have a sickly appearance. He does not appear ill.   HENT:   Head: Normocephalic.   Right Ear: Hearing, tympanic membrane and external ear normal. No drainage, swelling or tenderness. No mastoid tenderness. Tympanic membrane is not injected, not scarred, not erythematous and not bulging. Tympanic membrane mobility is normal. No middle ear effusion. No decreased hearing is noted.   Left Ear: Hearing,  tympanic membrane and external ear normal. No drainage, swelling or tenderness. No mastoid tenderness. Tympanic membrane is not injected, not scarred, not erythematous and not bulging. Tympanic membrane mobility is normal.  No middle ear effusion. No decreased hearing is noted.   Nose: Nose normal. No mucosal edema, rhinorrhea, sinus tenderness or nasal deformity. Right sinus exhibits no maxillary sinus tenderness and no frontal sinus tenderness. Left sinus exhibits no maxillary sinus tenderness and no frontal sinus tenderness.   Mouth/Throat: Oropharynx is clear and moist and mucous membranes are normal. Abnormal dentition. Dental caries present. No tonsillar exudate.   Eyes: Conjunctivae and lids are normal. Pupils are equal, round, and reactive to light. Right eye exhibits no discharge and no exudate. Left eye exhibits no discharge and no exudate.   Neck: Trachea normal and normal range of motion. Carotid bruit is not present. No edema present. No thyroid mass and no thyromegaly present.   Cardiovascular: Regular rhythm, normal heart sounds and normal pulses.   No murmur heard.  Repeat bp left arm 132/68   Pulmonary/Chest: Breath sounds normal. No respiratory distress. He has no decreased breath sounds. He has no wheezes. He has no rhonchi. He has no rales.   Lymphadenopathy:        Head (right side): No submental, no submandibular, no tonsillar, no preauricular, no posterior auricular and no occipital adenopathy present.        Head (left side): No submental, no submandibular, no tonsillar, no preauricular, no posterior auricular and no occipital adenopathy present.     He has no cervical adenopathy.   Neurological: He is alert.   Skin: Skin is warm, dry and intact. No cyanosis. Nails show no clubbing.   Neoplasm (raised flesh colored papule with specks of brown located on right anterior neck, measuring approximately 0.8 x 0.9 cm)       Assessment/Plan   Jose was seen today for loss of taste.    Diagnoses and  all orders for this visit:    Loss of taste  -     Ambulatory Referral to ENT (Otolaryngology)  -     Vitamin B12; Future  -     CBC No Differential; Future  -     Comprehensive Metabolic Panel; Future  -     Vitamin B12  -     CBC No Differential  -     Comprehensive Metabolic Panel    Neoplasm of uncertain behavior  -     Ambulatory Referral to Dermatology  -     Sedimentation Rate; Future  -     Sedimentation Rate    Essential hypertension  -     TSH Rfx On Abnormal To Free T4; Future  -     TSH Rfx On Abnormal To Free T4    He is new patient to me. He was advised to return to clinic for routine appt with Dr Oviedo.

## 2019-10-03 LAB
TSH SERPL-ACNC: 3.1 UIU/ML (ref 0.27–4.2)
VIT B12 SERPL-MCNC: 571 PG/ML (ref 211–946)

## 2019-10-10 ENCOUNTER — DOCUMENTATION (OUTPATIENT)
Dept: PAIN MEDICINE | Facility: CLINIC | Age: 69
End: 2019-10-10

## 2019-10-10 ENCOUNTER — OUTSIDE FACILITY SERVICE (OUTPATIENT)
Dept: PAIN MEDICINE | Facility: CLINIC | Age: 69
End: 2019-10-10

## 2019-10-10 PROCEDURE — 77002 NEEDLE LOCALIZATION BY XRAY: CPT | Performed by: ANESTHESIOLOGY

## 2019-10-10 PROCEDURE — 20552 NJX 1/MLT TRIGGER POINT 1/2: CPT | Performed by: ANESTHESIOLOGY

## 2019-10-11 NOTE — PROGRESS NOTES
Piriformis Injection  (unilateral)  [facility]      PREOPERATIVE DIAGNOSIS:  Piriformis syndrome, myofascial pain syndrome.    POSTOPERATIVE DIAGNOSIS:  Same as preop diagnosis    PROCEDURE:   Diagnostic Piriformis Injection at the on the left     PRE-PROCEDURE DISCUSSION WITH PATIENT:    Risks and complications were discussed with the patient prior to starting the procedure and informed consent was obtained.  We discussed various topics including but not limited to bleeding, infection, injury, nerve injury, paralysis, coma, death, postprocedural painful flare-up, postprocedural site soreness, and a lack of pain relief.  We discussed the diagnostic aspect of piriformis injection and the potential therapy of this type of a trigger point injection.    SURGEON:  Steven Corcoran MD    REASON FOR PROCEDURE:    He has multiple pain generators on history and physical but the left buttock pain in the area of the piriformis is the majority of his pain.  This patient does display a positive piriformis sign on exam, and has tenderness in the area that is overlying the distribution of the piriformis on exam under x-ray    SEDATION:  Versed 6mg & Fentanyl 50 mcg IV  ANESTHETIC:  Marcaine 0.25%  STEROID:  Methylprednisolone (DEPO MEDROL) 80mg/ml    DESCRIPTON OF PROCEDURE:  After obtaining informed consent, an I.V. was started in the preoperative area. The patient taken to the operating room and was placed in the prone position with a pillow under the abdomen.  All pressure points were well padded.  EKG, blood pressure, and pulse oximeter were monitored.  The lumbar area was prepped with Chloraprep and draped in a sterile fashion.     Under fluoroscopic guidance the point just lateral to the inferiormost point of the sacroiliac was visualized, which is also at the cephalad aspect of the sciatic notch.  Point was marked overlying the margin of the periosteum there.  This point was anesthetized with  subcutaneous Lidocaine 1%, and then a spinal needle was advanced to contact this margin of the bone.  The needle was walked caudad off the bone and advanced about 1-2mm further.  Resistance was felt upon entering this muscular trigger point.  Aspiration was checked and was negative.   Next, 1 mL of Omnipaque was injected. After seeing adequate spread in the corresponding piriformis muscle, flowing toward the greater trocanter, a total volume 2.5mL of injectate containing above mentioned local anesthetic solution was injected into the trigger point.    The needle was removed intact.  Vital signs were stable throughout.        ESTIMATED BLOOD LOSS:  minimal  SPECIMENS:  none    COMPLICATIONS:   No complications were noted., There was no indication of vascular uptake on live injection of contrast dye., There was no indication of intrathecal uptake on live injection of contrast dye., There was not any evidence of dural puncture.   and The patient did not have any signs of postprocedure numbness nor weakness.    TOLERANCE & DISCHARGE CONDITION:    The patient tolerated the procedure well.  The patient was transported to the recovery area without difficulties.  The patient was discharged to home under the care of family in stable and satisfactory condition.    PLAN OF CARE:  1. The patient was given our standard instruction sheet.  2. The patient will Return to clinic 4 wks and Repeat injection 2 wks.  3. The patient will resume all medications as per the medication reconciliation sheet.

## 2019-11-11 ENCOUNTER — OFFICE VISIT (OUTPATIENT)
Dept: PAIN MEDICINE | Facility: CLINIC | Age: 69
End: 2019-11-11

## 2019-11-11 VITALS
HEIGHT: 68 IN | SYSTOLIC BLOOD PRESSURE: 134 MMHG | HEART RATE: 98 BPM | RESPIRATION RATE: 20 BRPM | DIASTOLIC BLOOD PRESSURE: 78 MMHG | TEMPERATURE: 98.6 F | WEIGHT: 155.2 LBS | BODY MASS INDEX: 23.52 KG/M2 | OXYGEN SATURATION: 97 %

## 2019-11-11 DIAGNOSIS — G57.00 PIRIFORMIS SYNDROME, UNSPECIFIED LATERALITY: ICD-10-CM

## 2019-11-11 DIAGNOSIS — M48.062 SPINAL STENOSIS OF LUMBAR REGION WITH NEUROGENIC CLAUDICATION: ICD-10-CM

## 2019-11-11 DIAGNOSIS — G89.29 OTHER CHRONIC PAIN: Primary | ICD-10-CM

## 2019-11-11 PROCEDURE — 99213 OFFICE O/P EST LOW 20 MIN: CPT | Performed by: NURSE PRACTITIONER

## 2019-11-11 NOTE — PROGRESS NOTES
CHIEF COMPLAINT  F/u back pain. Pt had Piriformis Injection  (unilateral) on 10/10/19. Pt sts receiving 80% pain relief x 2 weeks then pain returned to baseline, pt sts was able to perform activities without a lot of pain. Pt would like to discuss having another Piriformis Injection  (unilateral). Pt sts does not take any pain meds otc.  Pt sts he quit smoking 8/2017, pt sts still drinks 12 beers a day.     Subjective   Jose Villafuerte is a 69 y.o. male  who presents to the office for follow-up.He has a history of chronic back pain. Reports his pain is variable since last office visit.    Patient presents to the office for follow-up of PROCEDURE. Patient had a LEFT PIRIFORMIS performed by Dr. HERNANDEZ on 10-10-19 for management of LEFT LOW BACK AND BUTTOCK PAIN. Patient reports 80% relief from the procedure for 2 weeks. Patient reports he had improvement in function and activity following the procedure.  He noticed the pain in his leg was relieved during that time. He reports the pain has gradually returned to pre-procedure level.     Complains of pain in his left low back, left buttock and left leg. Today his pain is 6/10VAs. Describes his pain as continuous aching. Pain worsens as day progresses. Does not interfere with his sleep. ADL's by self. Denies any bowel or bladder changes since last office visit.     Back Pain   This is a chronic problem. The current episode started more than 1 year ago. The problem occurs constantly. The problem has been gradually worsening since onset. The pain is present in the lumbar spine and sacro-iliac. The quality of the pain is described as aching. The pain is at a severity of 6/10. The pain is moderate. The pain is worse during the day. The symptoms are aggravated by bending, sitting and position. Pertinent negatives include no abdominal pain, bladder incontinence, bowel incontinence, chest pain, headaches, numbness or weakness. Risk factors include sedentary lifestyle and lack  "of exercise. He has tried bed rest, heat, home exercises, ice, NSAIDs and walking for the symptoms. The treatment provided mild relief.      PEG Assessment   What number best describes your pain on average in the past week?6  What number best describes how, during the past week, pain has interfered with your enjoyment of life?6  What number best describes how, during the past week, pain has interfered with your general activity?  6    The following portions of the patient's history were reviewed and updated as appropriate: allergies, current medications, past family history, past medical history, past social history, past surgical history and problem list.    Review of Systems   Constitutional: Positive for activity change (inc). Negative for fatigue.   HENT: Negative for congestion.    Eyes: Negative for visual disturbance.   Respiratory: Positive for shortness of breath (occ w exertion). Negative for cough.    Cardiovascular: Negative for chest pain.   Gastrointestinal: Negative for abdominal pain, bowel incontinence, constipation and diarrhea.   Genitourinary: Negative for bladder incontinence and difficulty urinating.   Musculoskeletal: Positive for back pain. Negative for gait problem.   Allergic/Immunologic: Negative for immunocompromised state.   Neurological: Negative for dizziness, weakness, light-headedness, numbness and headaches.   Psychiatric/Behavioral: Negative for agitation, sleep disturbance and suicidal ideas. The patient is not nervous/anxious.        Vitals:    11/11/19 0954   BP: 134/78   Pulse: 98   Resp: 20   Temp: 98.6 °F (37 °C)   SpO2: 97%   Weight: 70.4 kg (155 lb 3.2 oz)   Height: 172.7 cm (68\")   PainSc:   6   PainLoc: Back     Objective   Physical Exam   Constitutional: He is oriented to person, place, and time. Vital signs are normal. He appears well-developed and well-nourished. He is cooperative.   HENT:   Head: Normocephalic and atraumatic.   Nose: Nose normal.   Eyes: Conjunctivae " and lids are normal.   Neck: Trachea normal. Neck supple.   Cardiovascular: Normal rate.   Pulmonary/Chest: Effort normal.   Abdominal: Normal appearance.   Musculoskeletal:        Lumbar back: He exhibits decreased range of motion, tenderness and bony tenderness (mild left L4-S1 facet tenderness).   NO bilateral SI joint tenderness    Moderate tenderness of left piriformis, negative  Of right.     Neurological: He is alert and oriented to person, place, and time. Gait normal.   Skin: Skin is warm, dry and intact.   Psychiatric: He has a normal mood and affect. His speech is normal and behavior is normal. Judgment and thought content normal. Cognition and memory are normal.   Nursing note and vitals reviewed.          Assessment/Plan   Jose was seen today for back pain.    Diagnoses and all orders for this visit:    Other chronic pain    Spinal stenosis of lumbar region with neurogenic claudication    Piriformis syndrome, unspecified laterality  -     Case Request      ---  Repeat left piriformis injection. Reviewed the procedure at length with the patient.  Included in the review was expectations, complications, risk and benefits.The procedure was described in detail and the risks, benefits and alternatives were discussed with the patient (including but not limited to: bleeding, infection, nerve damage, worsening of pain, inability to perform injection, paralysis, seizures, and death) who agreed to proceed.  Discussed the potential for sedation if warranted/wanted.  The procedure will plan to be performed at [facility] with fluoroscopic guidance(unless ultrasound is indicated). Questions were answered and in a way the patient could understand.  Patient verbalized understanding and wishes to proceed.  This intervention will be ordered.  Discussed with patient that all procedures are part of a multimodal plan of care and include either formal PT or a home exercise program.    --- Follow-up  after procedure or sooner if needed.       YUNIEL REPORT  YUNIEL report has been reviewed and scanned into the patient's chart.    As the clinician, I personally reviewed the YUNIEL from 11-8-19 while the patient was in the office today.          EMR Dragon/Transcription disclaimer:   Much of this encounter note is an electronic transcription/translation of spoken language to printed text. The electronic translation of spoken language may permit erroneous, or at times, nonsensical words or phrases to be inadvertently transcribed; Although I have reviewed the note for such errors, some may still exist.

## 2019-11-13 ENCOUNTER — OUTSIDE FACILITY SERVICE (OUTPATIENT)
Dept: PAIN MEDICINE | Facility: CLINIC | Age: 69
End: 2019-11-13

## 2019-11-13 ENCOUNTER — DOCUMENTATION (OUTPATIENT)
Dept: PAIN MEDICINE | Facility: CLINIC | Age: 69
End: 2019-11-13

## 2019-11-13 PROCEDURE — 77002 NEEDLE LOCALIZATION BY XRAY: CPT | Performed by: ANESTHESIOLOGY

## 2019-11-13 PROCEDURE — 20552 NJX 1/MLT TRIGGER POINT 1/2: CPT | Performed by: ANESTHESIOLOGY

## 2019-11-13 NOTE — PROGRESS NOTES
Piriformis Injection  (unilateral)  [facility]      PREOPERATIVE DIAGNOSIS:  Piriformis syndrome, myofascial pain syndrome.    POSTOPERATIVE DIAGNOSIS:  Same as preop diagnosis    PROCEDURE:   Diagnostic Piriformis Injection at the on the left     PRE-PROCEDURE DISCUSSION WITH PATIENT:    Risks and complications were discussed with the patient prior to starting the procedure and informed consent was obtained.  We discussed various topics including but not limited to bleeding, infection, injury, nerve injury, paralysis, coma, death, postprocedural painful flare-up, postprocedural site soreness, and a lack of pain relief.  We discussed the diagnostic aspect of piriformis injection and the potential therapy of this type of a trigger point injection.    SURGEON:  Steven Corcoran MD    REASON FOR PROCEDURE:    Previous dx+ piriformis injection, with some 40% improvement sustained.  This patient does display a positive piriformis sign on exam, and has tenderness in the area that is overlying the distribution of the piriformis on exam under x-ray    SEDATION:  Versed 6mg & Fentanyl 50 mcg IV  ANESTHETIC:  Marcaine 0.5%  STEROID:  Methylprednisolone (DEPO MEDROL) 40mg/ml    DESCRIPTON OF PROCEDURE:  After obtaining informed consent, an I.V. was started in the preoperative area. The patient taken to the operating room and was placed in the prone position with a pillow under the abdomen.  All pressure points were well padded.  EKG, blood pressure, and pulse oximeter were monitored.  The lumbar area was prepped with Chloraprep and draped in a sterile fashion.     Under fluoroscopic guidance the point just lateral to the inferiormost point of the sacroiliac was visualized, which is also at the cephalad aspect of the sciatic notch.  Point was marked overlying the margin of the periosteum there.  This point was anesthetized with subcutaneous Lidocaine 1%, and then a spinal needle was advanced to contact this  margin of the bone.  The needle was walked caudad off the bone and advanced about 1-2mm further.  Resistance was felt upon entering this muscular trigger point.  Aspiration was checked and was negative.   Next, 1 mL of Omnipaque was injected. After seeing adequate spread in the corresponding piriformis muscle, flowing toward the greater trocanter, a total volume 2.5mL of injectate containing above mentioned local anesthetic solution was injected into the trigger point.    The needle was removed intact.  Vital signs were stable throughout.        ESTIMATED BLOOD LOSS:  minimal  SPECIMENS:  none    COMPLICATIONS:   No complications were noted., There was no indication of vascular uptake on live injection of contrast dye. and The patient did not have any signs of postprocedure numbness nor weakness.    TOLERANCE & DISCHARGE CONDITION:    The patient tolerated the procedure well.  The patient was transported to the recovery area without difficulties.  The patient was discharged to home under the care of family in stable and satisfactory condition.    PLAN OF CARE:  1. The patient was given our standard instruction sheet.  2. The patient will Return to clinic 2 wks.  3. The patient will resume all medications as per the medication reconciliation sheet.

## 2019-11-25 ENCOUNTER — TELEPHONE (OUTPATIENT)
Dept: INTERNAL MEDICINE | Facility: CLINIC | Age: 69
End: 2019-11-25

## 2019-11-25 DIAGNOSIS — I10 ESSENTIAL HYPERTENSION: ICD-10-CM

## 2019-11-25 RX ORDER — AMLODIPINE BESYLATE 10 MG/1
10 TABLET ORAL DAILY
Qty: 90 TABLET | Refills: 3 | Status: SHIPPED | OUTPATIENT
Start: 2019-11-25 | End: 2020-11-18 | Stop reason: SDUPTHER

## 2019-11-25 RX ORDER — LISINOPRIL 40 MG/1
40 TABLET ORAL DAILY
Qty: 90 TABLET | Refills: 1 | Status: SHIPPED | OUTPATIENT
Start: 2019-11-25 | End: 2020-09-03

## 2019-11-25 NOTE — TELEPHONE ENCOUNTER
Patient has appointment in December, but is running out of the following medications and wanted to know if a refill can be sent in for him:    lisinopril (PRINIVIL,ZESTRIL) 40 MG tablet 90 tablet     Sig: TAKE ONE TABLET BY MOUTH DAILY   (said he had about 10 of these left)       amLODIPine (NORVASC) 10 MG tablet 90 tablet 1    Sig - Route: Take 1 tablet by mouth Daily. - Oral   (has about five of these)     Pharmacy     [facility] [place] [id number] - [address] AT [facility] - [phone]  - [phone] FX

## 2019-11-26 ENCOUNTER — OFFICE VISIT (OUTPATIENT)
Dept: PAIN MEDICINE | Facility: CLINIC | Age: 69
End: 2019-11-26

## 2019-11-26 VITALS
BODY MASS INDEX: 23.4 KG/M2 | OXYGEN SATURATION: 95 % | WEIGHT: 154.4 LBS | SYSTOLIC BLOOD PRESSURE: 127 MMHG | RESPIRATION RATE: 20 BRPM | HEIGHT: 68 IN | DIASTOLIC BLOOD PRESSURE: 77 MMHG | TEMPERATURE: 98.6 F | HEART RATE: 94 BPM

## 2019-11-26 DIAGNOSIS — M79.18 PIRIFORMIS MUSCLE PAIN: ICD-10-CM

## 2019-11-26 DIAGNOSIS — M48.062 SPINAL STENOSIS OF LUMBAR REGION WITH NEUROGENIC CLAUDICATION: ICD-10-CM

## 2019-11-26 DIAGNOSIS — G89.29 OTHER CHRONIC PAIN: Primary | ICD-10-CM

## 2019-11-26 DIAGNOSIS — M53.3 SI (SACROILIAC) JOINT DYSFUNCTION: ICD-10-CM

## 2019-11-26 DIAGNOSIS — M46.1 SACROILIITIS (HCC): ICD-10-CM

## 2019-11-26 PROCEDURE — 99213 OFFICE O/P EST LOW 20 MIN: CPT | Performed by: NURSE PRACTITIONER

## 2019-11-26 NOTE — PROGRESS NOTES
CHIEF COMPLAINT  F/u back pain. Pt had Piriformis Injection  (unilateral) on 11/13/19. Pt sts receiving 75% relief x a week then pain returned to baseline. Pt sts he decreased alcohol intake the day of the injection from 12 beers to 6 beers, then the day after the procedure, drank more than 12 beers probably 18 beers and smoked marijuana on 11/14/19. Pt sts constant ache in lower back and left hip, pain is radiating down left leg since a week after procedure.     Subjective   Jose Villafuerte is a 69 y.o. male  who presents to the office for follow-up.He has a history of chronic back pain. Reports his pain has been variable since last office visit.  Patient presents to the office for follow-up of PROCEDURE. Patient had a LEFT PIRIFORMIS performed by Dr. HERNANDEZ on 11-13-19 for management of LOW BACK AND BUTTOCK PAIN. Patient reports 75% relief from the procedure for 1-2 weeks.    Patient had a LEFT PIRIFORMIS performed by Dr. HERNANDEZ on 10-10-19 for management of LEFT LOW BACK AND BUTTOCK PAIN. Patient reports 80% relief from the procedure for 2 weeks.     Complains of pain in his low back and buttock, mainly left side. Today his pain is 7/10VAS. Describes his pain as nearly continuous aching and throbbing. The pain can radiate down his posterior left leg. Pain increases with sitting, prolonged position; pain decreases with laying down. ADL's by self. Denies any bowel or bladder incontinence.  Denies any leg weakness or falls.     Back Pain   This is a chronic problem. The current episode started more than 1 year ago. The problem occurs constantly. The problem has been gradually worsening (variable since last office visit) since onset. The pain is present in the lumbar spine and sacro-iliac. The quality of the pain is described as aching. The pain is at a severity of 7/10. The pain is moderate. The pain is worse during the day. The symptoms are aggravated by bending, sitting and position. Pertinent negatives  include no abdominal pain, bladder incontinence, bowel incontinence, chest pain, headaches, numbness or weakness. Risk factors include sedentary lifestyle and lack of exercise. He has tried bed rest, heat, home exercises, ice, NSAIDs and walking for the symptoms. The treatment provided mild relief.      POSTMYELOGRAM CT SCAN OF THE LUMBAR SPINE INCLUDING RECONSTRUCTION  IMAGES 12/24/2018     HISTORY: Low back pain. Left leg pain.     TECHNIQUE: Following the lumbar spine myelogram, postmyelogram CT scan  was obtained from the lower thoracic spine to the sacrum. Sagittal and  coronal reconstruction images were reviewed.     FINDINGS: There is a moderate compression deformity of the L1 vertebra  with approximately 40% to 50% loss of the anterior vertebral body  height, but this does not appear acute. No significant subluxation is  seen.     At T11-T12, there is mild vacuum disc phenomenon with minimal  broad-based disc bulge. No spinal canal stenosis is seen.     At T12-L1, there is no significant disc bulge or canal stenosis.     No significant disc bulge is seen at L1-L2. No significant spinal canal  stenosis is seen.     No significant disc bulge seen at L2-L3 with no significant spinal canal  stenosis.     At L3-L4, there is mild broad-based disc bulge with mild bilateral  foraminal narrowing. There is ligamentum flavum hypertrophy and mild  spinal canal stenosis at L3-L4.     At L4-L5, there is mild-to-moderate broad-based disc bulge. There is  bilateral facet joint arthropathy. There is mild foraminal narrowing.  Minimal canal stenosis is seen.     Vacuum disc phenomenon is seen at L5-S1 with mild disc osteophyte  complex. There is mild foraminal narrowing. There is facet joint  arthropathy. No significant spinal canal stenosis is seen.     Lower spinal cord appears normal.     IMPRESSION:  1. Mild multilevel lower thoracic and lumbar degenerative disc disease  as described including an element of mild spinal  "canal stenosis at L3-L4  and L4-L5.  2. Old L1 compression fracture.     Radiation dose reduction techniques were utilized, including automated  exposure control and exposure modulation based on body size.     This report was finalized on 12/28/2018 9:34 AM by Dr. Pastor Mansfield M.D.    PEG Assessment   What number best describes your pain on average in the past week?7  What number best describes how, during the past week, pain has interfered with your enjoyment of life?7  What number best describes how, during the past week, pain has interfered with your general activity?  7    The following portions of the patient's history were reviewed and updated as appropriate: allergies, current medications, past family history, past medical history, past social history, past surgical history and problem list.    Review of Systems   Constitutional: Negative for activity change and fatigue.   HENT: Negative for congestion.    Eyes: Negative for visual disturbance.   Respiratory: Positive for shortness of breath (occ ). Negative for cough.    Cardiovascular: Negative for chest pain.   Gastrointestinal: Negative for abdominal pain, bowel incontinence, constipation and diarrhea.   Genitourinary: Negative for bladder incontinence and difficulty urinating.   Musculoskeletal: Positive for arthralgias (left hip left leg) and back pain. Negative for gait problem.   Allergic/Immunologic: Negative for immunocompromised state.   Neurological: Negative for dizziness, weakness, light-headedness, numbness and headaches.   Psychiatric/Behavioral: Negative for agitation, sleep disturbance and suicidal ideas. The patient is not nervous/anxious.        Vitals:    11/26/19 1529   BP: 127/77   Pulse: 94   Resp: 20   Temp: 98.6 °F (37 °C)   SpO2: 95%   Weight: 70 kg (154 lb 6.4 oz)   Height: 172.7 cm (68\")   PainSc:   7   PainLoc: Back     Objective   Physical Exam   Constitutional: He is oriented to person, place, and time. Vital signs are " normal. He appears well-developed and well-nourished. He is cooperative.   HENT:   Head: Normocephalic and atraumatic.   Nose: Nose normal.   Eyes: Conjunctivae and lids are normal.   Neck: Trachea normal. Neck supple.   Cardiovascular: Normal rate.   Pulmonary/Chest: Effort normal.   Abdominal: Normal appearance.   Musculoskeletal:        Lumbar back: He exhibits decreased range of motion, tenderness and bony tenderness (mild left L4-S1 facet tenderness).   Moderate left SI joint tenderness, minimal of right    Moderate tenderness of left piriformis, negative  Of right.     Neurological: He is alert and oriented to person, place, and time. Gait normal.   Skin: Skin is warm, dry and intact.   Psychiatric: He has a normal mood and affect. His speech is normal and behavior is normal. Judgment and thought content normal. Cognition and memory are normal.   Nursing note and vitals reviewed.    Assessment/Plan   Jose was seen today for back pain.    Diagnoses and all orders for this visit:    Other chronic pain    Spinal stenosis of lumbar region with neurogenic claudication    Piriformis muscle pain    Sacroiliitis (CMS/HCC)  -     Case Request    SI (sacroiliac) joint dysfunction  -     Case Request      --- Left Si joint injection. No blood thinners. Reviewed the procedure at length with the patient.  Included in the review was expectations, complications, risk and benefits.The procedure was described in detail and the risks, benefits and alternatives were discussed with the patient (including but not limited to: bleeding, infection, nerve damage, worsening of pain, inability to perform injection, paralysis, seizures, and death) who agreed to proceed.  Discussed the potential for sedation if warranted/wanted.  The procedure will plan to be performed at [facility] with fluoroscopic guidance(unless ultrasound is indicated). Questions were answered and in a way the patient could understand.   Patient verbalized understanding and wishes to proceed.  This intervention will be ordered.  Discussed with patient that all procedures are part of a multimodal plan of care and include either formal PT or a home exercise program.    --- Consider PT, patient declined.  --- Follow-up after procedure or sooner if needed.    ----------  Education about Sacroiliac joint injections:  This Sacroiliac joint injection (blockade) we have suggested is intended for diagnostic purposes, with the intent of offering the patient Radiofrequency thermal rhizotomy (RF) of the sensory branches to the joint if the block is diagnostically effective.  The diagnostic blockade is necessary to determine the likelihood that RF therapy could be efficacious in providing long term relief to the patient.    In this procedure, the sacroiliac joint is aligned with imaging, and under image guidance a needle is placed with the needle tip into the joint.  The needle position is confirmed to be appropriately in the joint before injection of medication into the joint.  When xray fluoroscopy is used, contrast dye is used to confirm a proper arthrogram (i.e., outline of the joint).  When ultrasound is used, IV fluid (normal saline) is injected to see the flow of the fluid into the joint.  Once confirmed, then the medication can be injected into the joint.  Oftentimes this medication is a combination of local anesthetics (for diagnostic purposes) and also a steroid (to decrease irritation & inflammation in the joint, also known as sacroilitis).      Medically, a successful RF procedure should provide a patient with 50% pain relief or more for at least 6 months.  Clinical experience suggests that successful patients receive relief more in the range of 12 months on average.  We also discussed that many patients receive therapeutic success from the intraarticular joint injection, and may not require RF ablation.  If a patient receives more than 8 weeks of  relief from joint injection, then occasional repeat joint blocks for therapeutic purposes is a very reasonable alternative therapy.  This course of therapy is consistent with our LCDs according to our CMS  in the area, and therefore other insurance providers should follow accordingly.  We will monitor our patients to screen for these therapeutic responders and will offer RF therapy only when necessary.      We discussed that joint injections & also RF procedures are not without risks.  Best practices regarding anticoagulant use & neuraxial procedures will be respected.  Oftentimes a patient on an anticoagulant can be offered a joint injection safely, but again this is not risk-free, and such patients give consent with regards to this increased bleeding risk, which could cause problems including but not limited to worsening of pain, nerve damage, or muscle damage.  Patients that are ill or otherwise may be at risk for sepsis will not have their spines accessed by neuraxial injections of any type.  This patient will not be offered these therapies if there is an increased risk.   We discussed that there is a risk of postprocedural pain and also a risk of worsening of clinical picture with these procedures as with any neuraxial procedure.    ----------       YUNIEL REPORT  YUNIEL report has been reviewed and scanned into the patient's chart.    As the clinician, I personally reviewed the YUNIEL from 11-25-19 while the patient was in the office today.          EMR Dragon/Transcription disclaimer:   Much of this encounter note is an electronic transcription/translation of spoken language to printed text. The electronic translation of spoken language may permit erroneous, or at times, nonsensical words or phrases to be inadvertently transcribed; Although I have reviewed the note for such errors, some may still exist.

## 2019-12-18 ENCOUNTER — DOCUMENTATION (OUTPATIENT)
Dept: PAIN MEDICINE | Facility: CLINIC | Age: 69
End: 2019-12-18

## 2019-12-18 ENCOUNTER — OUTSIDE FACILITY SERVICE (OUTPATIENT)
Dept: PAIN MEDICINE | Facility: CLINIC | Age: 69
End: 2019-12-18

## 2019-12-18 PROCEDURE — 27096 INJECT SACROILIAC JOINT: CPT | Performed by: ANESTHESIOLOGY

## 2020-01-14 ENCOUNTER — OFFICE VISIT (OUTPATIENT)
Dept: PAIN MEDICINE | Facility: CLINIC | Age: 70
End: 2020-01-14

## 2020-01-14 VITALS
DIASTOLIC BLOOD PRESSURE: 75 MMHG | SYSTOLIC BLOOD PRESSURE: 128 MMHG | TEMPERATURE: 98.4 F | HEIGHT: 68 IN | OXYGEN SATURATION: 94 % | RESPIRATION RATE: 20 BRPM | BODY MASS INDEX: 23.67 KG/M2 | HEART RATE: 98 BPM | WEIGHT: 156.2 LBS

## 2020-01-14 DIAGNOSIS — M54.16 LUMBAR RADICULOPATHY: ICD-10-CM

## 2020-01-14 DIAGNOSIS — G89.29 OTHER CHRONIC PAIN: Primary | ICD-10-CM

## 2020-01-14 DIAGNOSIS — M79.18 PIRIFORMIS MUSCLE PAIN: ICD-10-CM

## 2020-01-14 DIAGNOSIS — M48.062 SPINAL STENOSIS OF LUMBAR REGION WITH NEUROGENIC CLAUDICATION: ICD-10-CM

## 2020-01-14 PROCEDURE — 99213 OFFICE O/P EST LOW 20 MIN: CPT | Performed by: NURSE PRACTITIONER

## 2020-01-14 NOTE — PROGRESS NOTES
"CHIEF COMPLAINT  F/u back pain. Pt had LEFT Sacroiliac Joint Injection on 12/18/19. Pt sts receiving 20% relief x a few days then pain returned to baseline. Pt sts after he was driven home from inj, he fell out of the truck from effects of sedation then fell in the house, he was still sedated from procedure, then he was in severe pain the next couple of days after procedure, then felt some relief for a few days and now c/o numbness and tingling down left leg and pain in left hip. Pt sts sits on right side to avoid pain in left hip. Pt sts left side back pain has worsened since inj. Pt sts he had more relief from piriformis inj in the past.      Subjective   Jose Villafuerte is a 69 y.o. male  who presents to the office for follow-up.He has a history of chronic back and left buttock pain. Reports his pain is unchanged since last office visit.    Patient presents to the office for follow-up of PROCEDURE. Patient had a LEFT SI JOINT INJECTION performed by Dr. HERNANDEZ on 12-18-19 for management of low back pain . Patient reports 20% intermittent relief from the procedure. He fell out of the truck at his house. Got out of truck and then fell. He then fell again going inside house. He felt both legs were weak. This lasted approximately 8 hours. He felt \"whoozy\" for couple days after that.  Reports left low back pain is worse since office visit.    Complains of pain in his low back, left hip, left buttock and left leg. Today his pain is 6/10VAS. Describes his pain as nearly continuous cramping. Pain worsens as day progresses. ADL's by self. Denies any bowel or bladder changes.    Patient had a LEFT PIRIFORMIS performed by Dr. HERNANDEZ on 11-13-19 for management of LOW BACK AND BUTTOCK PAIN. Patient reports 75% relief from the procedure for 1-2 weeks.  Patient had a LEFT PIRIFORMIS performed by Dr. HERNANDEZ on 10-10-19 for management of LEFT LOW BACK AND BUTTOCK PAIN. Patient reports 80% relief from the procedure for 2 " jamila.    Saw Dr Mackey. Plan was for surgery but that was cancelled(May 2019). Referred to pain management by Dr Hinton. Has not seen patient recently.   Back Pain   This is a chronic problem. The current episode started more than 1 year ago. The problem occurs constantly. The problem has been gradually worsening (variable since last office visit) since onset. The pain is present in the lumbar spine and sacro-iliac. The quality of the pain is described as aching. The pain is at a severity of 6/10. The pain is moderate. The pain is worse during the day. The symptoms are aggravated by bending, sitting and position. Pertinent negatives include no abdominal pain, bladder incontinence, bowel incontinence, chest pain, headaches, numbness or weakness. Risk factors include sedentary lifestyle and lack of exercise. He has tried bed rest, heat, home exercises, ice, NSAIDs and walking for the symptoms. The treatment provided mild relief.      POSTMYELOGRAM CT SCAN OF THE LUMBAR SPINE INCLUDING RECONSTRUCTION  IMAGES 12/24/2018     HISTORY: Low back pain. Left leg pain.     TECHNIQUE: Following the lumbar spine myelogram, postmyelogram CT scan  was obtained from the lower thoracic spine to the sacrum. Sagittal and  coronal reconstruction images were reviewed.     FINDINGS: There is a moderate compression deformity of the L1 vertebra  with approximately 40% to 50% loss of the anterior vertebral body  height, but this does not appear acute. No significant subluxation is  seen.     At T11-T12, there is mild vacuum disc phenomenon with minimal  broad-based disc bulge. No spinal canal stenosis is seen.     At T12-L1, there is no significant disc bulge or canal stenosis.     No significant disc bulge is seen at L1-L2. No significant spinal canal  stenosis is seen.     No significant disc bulge seen at L2-L3 with no significant spinal canal  stenosis.     At L3-L4, there is mild broad-based disc bulge with mild bilateral  foraminal  narrowing. There is ligamentum flavum hypertrophy and mild  spinal canal stenosis at L3-L4.     At L4-L5, there is mild-to-moderate broad-based disc bulge. There is  bilateral facet joint arthropathy. There is mild foraminal narrowing.  Minimal canal stenosis is seen.     Vacuum disc phenomenon is seen at L5-S1 with mild disc osteophyte  complex. There is mild foraminal narrowing. There is facet joint  arthropathy. No significant spinal canal stenosis is seen.     Lower spinal cord appears normal.     IMPRESSION:  1. Mild multilevel lower thoracic and lumbar degenerative disc disease  as described including an element of mild spinal canal stenosis at L3-L4  and L4-L5.  2. Old L1 compression fracture.     Radiation dose reduction techniques were utilized, including automated  exposure control and exposure modulation based on body size.     This report was finalized on 12/28/2018 9:34 AM by Dr. Pastor Mansfield M.D.    PEG Assessment   What number best describes your pain on average in the past week?6  What number best describes how, during the past week, pain has interfered with your enjoyment of life?6  What number best describes how, during the past week, pain has interfered with your general activity?  6    The following portions of the patient's history were reviewed and updated as appropriate: allergies, current medications, past family history, past medical history, past social history, past surgical history and problem list.    Review of Systems   Constitutional: Negative for activity change and fatigue.   HENT: Negative for congestion.    Eyes: Negative for visual disturbance.   Respiratory: Positive for shortness of breath (occ ). Negative for cough.    Cardiovascular: Negative for chest pain.   Gastrointestinal: Negative for abdominal pain, bowel incontinence, constipation and diarrhea.   Genitourinary: Negative for bladder incontinence and difficulty urinating.   Musculoskeletal: Positive for arthralgias  "(left hip left leg) and back pain. Negative for gait problem.   Allergic/Immunologic: Negative for immunocompromised state.   Neurological: Negative for dizziness, weakness, light-headedness, numbness and headaches.   Psychiatric/Behavioral: Negative for agitation, sleep disturbance and suicidal ideas. The patient is not nervous/anxious.        Vitals:    01/14/20 1026   BP: 128/75   Pulse: 98   Resp: 20   Temp: 98.4 °F (36.9 °C)   SpO2: 94%   Weight: 70.9 kg (156 lb 3.2 oz)   Height: 172.7 cm (68\")   PainSc:   6   PainLoc: Back     Objective   Physical Exam   Constitutional: He is oriented to person, place, and time. Vital signs are normal. He appears well-developed and well-nourished. He is cooperative.   HENT:   Head: Normocephalic and atraumatic.   Nose: Nose normal.   Eyes: Conjunctivae and lids are normal.   Neck: Trachea normal. Neck supple.   Cardiovascular: Normal rate.   Pulmonary/Chest: Effort normal.   Abdominal: Normal appearance.   Musculoskeletal:        Lumbar back: He exhibits decreased range of motion, tenderness and bony tenderness (mild left L4-S1 facet tenderness).   mild left SI joint tenderness, minimal of right    Mild tenderness of left piriformis, negative  Of right.  +SLR on left, negative on right     Neurological: He is alert and oriented to person, place, and time. Gait normal.   Skin: Skin is warm, dry and intact.   Psychiatric: He has a normal mood and affect. His speech is normal and behavior is normal. Judgment and thought content normal. Cognition and memory are normal.   Nursing note and vitals reviewed.          Assessment/Plan   Jose was seen today for back pain.    Diagnoses and all orders for this visit:    Other chronic pain    Spinal stenosis of lumbar region with neurogenic claudication  -     Case Request    Piriformis muscle pain    Lumbar radiculopathy  -     Case Request      --- TF TOM left L2 and L5. No blood thinners. Reviewed the procedure at length with the " patient.  Included in the review was expectations, complications, risk and benefits.The procedure was described in detail and the risks, benefits and alternatives were discussed with the patient (including but not limited to: bleeding, infection, nerve damage, worsening of pain, inability to perform injection, paralysis, seizures, and death) who agreed to proceed.  Discussed the potential for sedation if warranted/wanted.  The procedure will plan to be performed at [facility] with fluoroscopic guidance(unless ultrasound is indicated). Questions were answered and in a way the patient could understand.  Patient verbalized understanding and wishes to proceed.  This intervention will be ordered.  Discussed with patient that all procedures are part of a multimodal plan of care and include either formal PT or a home exercise program.  Patient has no evidence of coagulopathy or current infection.  --- Follow-up after procedure or sooner if needed       YUNIEL REPORT  YUNIEL report has been reviewed and scanned into the patient's chart.    As the clinician, I personally reviewed the YUNIEL from 1-13-20 while the patient was in the office today.        EMR Dragon/Transcription disclaimer:   Much of this encounter note is an electronic transcription/translation of spoken language to printed text. The electronic translation of spoken language may permit erroneous, or at times, nonsensical words or phrases to be inadvertently transcribed; Although I have reviewed the note for such errors, some may still exist.

## 2020-02-04 ENCOUNTER — OUTSIDE FACILITY SERVICE (OUTPATIENT)
Dept: PAIN MEDICINE | Facility: CLINIC | Age: 70
End: 2020-02-04

## 2020-02-04 ENCOUNTER — DOCUMENTATION (OUTPATIENT)
Dept: PAIN MEDICINE | Facility: CLINIC | Age: 70
End: 2020-02-04

## 2020-02-04 PROCEDURE — 64484 NJX AA&/STRD TFRM EPI L/S EA: CPT | Performed by: ANESTHESIOLOGY

## 2020-02-04 PROCEDURE — 64483 NJX AA&/STRD TFRM EPI L/S 1: CPT | Performed by: ANESTHESIOLOGY

## 2020-02-04 NOTE — PROGRESS NOTES
Lumbar Transforaminal Epidural Steroid Injection (two levels)  [facility]      PREOPERATIVE DIAGNOSIS:  Lumbar Spinal Stenosis unspecified regarding Neurogenic Claudication and right Lumbar Radiculopathy    POSTOPERATIVE DIAGNOSIS:  Same as preop diagnosis    PROCEDURE:    1. CPT 73168 --  Diagnostic Transforaminal Epidural Steroid Injection at the L2 level, on the right   2. CPT 62780 --  Diagnostic Transforaminal Epidural Steroid Injection at the L5 level, on the right     PRE-PROCEDURE DISCUSSION WITH PATIENT:    Risks and complications were discussed with the patient prior to starting the procedure and informed consent was obtained.  We discussed various topics including but not limited to bleeding, infection, injury, nerve injury, paralysis, coma, death, postprocedural painful flare-up, postprocedural site soreness, and a lack of pain relief.  We discussed the diagnostic aspect of transforaminal epidural / selective nerve root blockade.    SURGEON:  Steven Corcoran MD    REASON FOR PROCEDURE:    Degenerative changes are noted in the area., Stenotic area is noted, and is likely contributing to this chronic &/or recurrent pain.  and Radiating pattern of pain is likely consistent with degenerative changes in the area.    SEDATION:  Versed 6mg & Fentanyl 50 mcg IV  ANESTHETIC:  Marcaine 0.25%  STEROID:  Methylprednisolone (DEPO MEDROL) 80mg/ml    DESCRIPTON OF PROCEDURE:  After obtaining informed consent, an I.V. was started in the preoperative area. The patient taken to the operating room and was placed in the prone position with a pillow under the abdomen.  All pressure points were well padded.  EKG, blood pressure, and pulse oximeter were monitored.  The lumbar area was prepped with Chloraprep and draped in a sterile fashion. Under fluoroscopic guidance in an oblique dimension on the above mentioned side, the transverse process of the first aforementioned vertebra at the junction of the body  at 6 o'clock position was identified. Skin and subcutaneous tissue was anesthetized with 1% lidocaine. A 22-gauge spinal needle was introduced under fluoroscopic guidance at the above junction into the foramen without parasthesias and into the epidural space. After confirming the position of the needle with PA, lateral, and oblique fluoroscopic views, aspiration was checked and was clear of blood or CSF.  Next, 1 mL of Omnipaque was injected. After seeing adequate spread on the corresponding nerve root, a total volume 2mL of injectate containing local anesthetic as above and half of the above mentioned corticosteroid was injected into the epidural space.  The needle was removed intact.      Next, in similar fashion, the second level mentioned above was addressed and a similar amount of injectate was delivered after similar imaging was achieved.      Vital signs were stable throughout.          ESTIMATED BLOOD LOSS:  <5 mL  SPECIMENS:  none    COMPLICATIONS:   No complications were noted., There was no indication of vascular uptake on live injection of contrast dye., There was no indication of intrathecal uptake on live injection of contrast dye., There was not any evidence of dural puncture.   and The patient did not have any signs of postprocedure numbness nor weakness.    TOLERANCE & DISCHARGE CONDITION:    The patient tolerated the procedure well.  The patient was transported to the recovery area without difficulties.  The patient was discharged to home under the care of family in stable and satisfactory condition.    PLAN OF CARE:  1. The patient was given our standard instruction sheet.  2. The patient will Return to clinic 6 wks and Repeat injection in 2-4 wks PRN.  3. The patient will resume all medications as per the medication reconciliation sheet.

## 2020-02-05 ENCOUNTER — OFFICE VISIT (OUTPATIENT)
Dept: INTERNAL MEDICINE | Facility: CLINIC | Age: 70
End: 2020-02-05

## 2020-02-05 VITALS
OXYGEN SATURATION: 97 % | SYSTOLIC BLOOD PRESSURE: 122 MMHG | BODY MASS INDEX: 24.1 KG/M2 | HEART RATE: 78 BPM | DIASTOLIC BLOOD PRESSURE: 70 MMHG | WEIGHT: 159 LBS | HEIGHT: 68 IN

## 2020-02-05 DIAGNOSIS — F10.20 ALCOHOLISM (HCC): ICD-10-CM

## 2020-02-05 DIAGNOSIS — M48.062 SPINAL STENOSIS OF LUMBAR REGION WITH NEUROGENIC CLAUDICATION: Primary | ICD-10-CM

## 2020-02-05 DIAGNOSIS — J43.1 PANLOBULAR EMPHYSEMA (HCC): ICD-10-CM

## 2020-02-05 DIAGNOSIS — F17.210 CIGARETTE NICOTINE DEPENDENCE WITHOUT COMPLICATION: ICD-10-CM

## 2020-02-05 DIAGNOSIS — I10 ESSENTIAL HYPERTENSION: ICD-10-CM

## 2020-02-05 DIAGNOSIS — Z12.11 ENCOUNTER FOR SCREENING COLONOSCOPY: ICD-10-CM

## 2020-02-05 PROCEDURE — 99214 OFFICE O/P EST MOD 30 MIN: CPT | Performed by: INTERNAL MEDICINE

## 2020-02-05 NOTE — PROGRESS NOTES
"Subjective   Jose Villafuerte is a 69 y.o. male.  Patient presents with a chief complaint of spinal canal stenosis with neurogenic claudication that has been vastly improved by 3 epidural injections, well-controlled essential hypertension, unrepentant alcoholism, COPD with current smoking also completely unmotivated to quit here for follow-up evaluation and treatment.  The patient does want to get a screening colonoscopy and I told him I would arrange for her to have one done.      /70   Pulse 78   Ht 172.7 cm (67.99\")   Wt 72.1 kg (159 lb)   SpO2 97%   BMI 24.18 kg/m²     Body mass index is 24.18 kg/m².    History of Present Illness continued unrepentant alcoholism and smoking    The following portions of the patient's history were reviewed and updated as appropriate: allergies, current medications, past family history, past medical history, past social history, past surgical history and problem list.    Review of Systems   Constitutional: Positive for fatigue.   HENT: Positive for dental problem.    Respiratory: Negative.    Cardiovascular: Negative.    Gastrointestinal: Negative.    Musculoskeletal: Positive for arthralgias and back pain.       Objective   Physical Exam   Constitutional: He is oriented to person, place, and time. He appears well-developed and well-nourished.   HENT:   Very poor dentition   Cardiovascular: Normal rate, regular rhythm and normal heart sounds.   Pulmonary/Chest: Effort normal.   Very poor air movement   Abdominal: Soft. Bowel sounds are normal.   Musculoskeletal:   Low back pain with bilateral muscle spasm   Neurological: He is alert and oriented to person, place, and time.   Skin: Skin is warm and dry.   Psychiatric: He has a normal mood and affect. His behavior is normal.   Nursing note and vitals reviewed.        Assessment/Plan   Jose was seen today for hypertension.    Diagnoses and all orders for this visit:    Spinal stenosis of lumbar region with neurogenic " claudication  Comments:  Patient just finished the third of his epidural injections with apparent good success    Essential hypertension  Comments:  Well-controlled today    Alcoholism (CMS/HCC)  Comments:  Completely unmotivated to quit drinking    Panlobular emphysema (CMS/HCC)  Comments:  No declared shortness of breath or dyspnea on exertion    Cigarette nicotine dependence without complication  Comments:  Continue to encourage smoking cessation    Encounter for screening colonoscopy  Comments:  I am going to arrange for screening colonoscopy  Orders:  -     Ambulatory Referral For Screening Colonoscopy

## 2020-03-03 ENCOUNTER — DOCUMENTATION (OUTPATIENT)
Dept: PAIN MEDICINE | Facility: CLINIC | Age: 70
End: 2020-03-03

## 2020-03-03 ENCOUNTER — OUTSIDE FACILITY SERVICE (OUTPATIENT)
Dept: PAIN MEDICINE | Facility: CLINIC | Age: 70
End: 2020-03-03

## 2020-03-03 PROCEDURE — 64483 NJX AA&/STRD TFRM EPI L/S 1: CPT | Performed by: ANESTHESIOLOGY

## 2020-03-04 NOTE — PROGRESS NOTES
Lumbar Transforaminal Epidural Steroid Injection (one level Unilateral)  [facility]      PREOPERATIVE DIAGNOSIS:  Lumbar Spinal Stenosis unspecified regarding Neurogenic Claudication and left Lumbar Radiculopathy    POSTOPERATIVE DIAGNOSIS:  Same as preop diagnosis    PROCEDURE:  CPT 48866 --  Diagnostic Transforaminal Epidural Steroid Injection at the L5 level, on the left     PRE-PROCEDURE DISCUSSION WITH PATIENT:    Risks and complications were discussed with the patient prior to starting the procedure and informed consent was obtained.  We discussed various topics including but not limited to bleeding, infection, injury, nerve injury, paralysis, coma, death, postprocedural painful flare-up, postprocedural site soreness, and a lack of pain relief.  We discussed the diagnostic aspect of transforaminal epidural / selective nerve root blockade.    SURGEON:  Steven Corcoran MD    REASON FOR PROCEDURE:    RIght L-radic resolved at this time after the previous Right L2 & L5 LTFESI, no c/o left sided radic sx, posteriorly.    SEDATION:  Versed 6mg & Fentanyl 50 mcg IV  ANESTHETIC:  Marcaine 0.25%  STEROID:  Methylprednisolone (DEPO MEDROL) 80mg/ml    DESCRIPTON OF PROCEDURE:  After obtaining informed consent, an I.V. was started in the preoperative area. The patient taken to the operating room and was placed in the prone position with a pillow under the abdomen.  All pressure points were well padded.  EKG, blood pressure, and pulse oximeter were monitored.  The lumbar area was prepped with Chloraprep and draped in a sterile fashion. Under fluoroscopic guidance in an oblique dimension on the above mentioned side, the transverse process of the aforementioned vertebra at the junction of the body at 6 o'clock position was identified. Skin and subcutaneous tissue was anesthetized with 1% lidocaine. A 22-gauge spinal needle was introduced under fluoroscopic guidance at the above junction into the foramen  without parasthesias and into the epidural space. After confirming the position of the needle with PA, lateral, and oblique fluoroscopic views, aspiration was checked and was clear of blood or CSF.  Next, 1 mL of Omnipaque was injected. After seeing adequate spread on the corresponding nerve root, a total volume 3mL of injectate containing 1ml of the above mentioned local anesthetic, 1 ml saline,  and the above mentioned corticosteroid was injected into the epidural space.    The needle was removed intact.  Vital signs were stable throughout.        ESTIMATED BLOOD LOSS:  <5 mL  SPECIMENS:  none    COMPLICATIONS:   No complications were noted., There was no indication of vascular uptake on live injection of contrast dye. and The patient did not have any signs of postprocedure numbness nor weakness.    TOLERANCE & DISCHARGE CONDITION:    The patient tolerated the procedure well.  The patient was transported to the recovery area without difficulties.  The patient was discharged to home under the care of family in stable and satisfactory condition.    PLAN OF CARE:  1. The patient was given our standard instruction sheet.  2. The patient will Return to clinic 3-4 wks.  3. The patient will resume all medications as per the medication reconciliation sheet.

## 2020-06-02 ENCOUNTER — OFFICE VISIT (OUTPATIENT)
Dept: PAIN MEDICINE | Facility: CLINIC | Age: 70
End: 2020-06-02

## 2020-06-02 VITALS
HEIGHT: 68 IN | BODY MASS INDEX: 23.79 KG/M2 | HEART RATE: 85 BPM | DIASTOLIC BLOOD PRESSURE: 85 MMHG | TEMPERATURE: 98.4 F | RESPIRATION RATE: 20 BRPM | WEIGHT: 157 LBS | SYSTOLIC BLOOD PRESSURE: 143 MMHG | OXYGEN SATURATION: 95 %

## 2020-06-02 DIAGNOSIS — G89.29 OTHER CHRONIC PAIN: Primary | ICD-10-CM

## 2020-06-02 DIAGNOSIS — M48.062 SPINAL STENOSIS OF LUMBAR REGION WITH NEUROGENIC CLAUDICATION: ICD-10-CM

## 2020-06-02 DIAGNOSIS — M79.18 PIRIFORMIS MUSCLE PAIN: ICD-10-CM

## 2020-06-02 PROCEDURE — 99213 OFFICE O/P EST LOW 20 MIN: CPT | Performed by: NURSE PRACTITIONER

## 2020-06-02 NOTE — PROGRESS NOTES
CHIEF COMPLAINT  F/u back pain. Pt had Lumbar Transforaminal Epidural Steroid Injection (one level Unilateral) on 3/3/20. Pt sts receiving 70% relief x 2 weeks then pain returned to baseline. Pt would like to discuss getting a piriformis injection. Pt sts left lumbar pain is radiating down left leg.     Subjective   Jose Villafuerte is a 70 y.o. male  who presents for follow-up.He has a history of chronic back and leg pain. Reports his pain has been variable since last office visit.    Patient presents for follow-up of PROCEDURE. Patient had a TF TOM left L5 performed by Dr. HERNANDEZ on 3-3-20 for management of LOW BACK AND LEFT LEG PAIN. Patient reports 70% relief from the procedure for 2 weeks.    Patient presents for follow-up of PROCEDURE. Patient had a TF TOM right L2 and L5 performed by Dr. HERNANDEZ on 2-4-20 for management of LOW BACK AND LEG PAIN. Patient reports MODERATE relief from the procedure.    Complains of pain in his low back, left leg and left buttock. His left buttock pain is his primary complaint today. Wants to repeat piriformis injection. Today his pain is 6/10VAS. Describes his pain as nearly continuous throbbing and pulling.  Pain increases with prolonged sitting.; pain decreases with procedures. ADL's by self. Denies any bowel or bladder changes.    Saw Dr Mackey. Plan was for surgery but that was cancelled(May 2019). Referred to pain management by Dr Hinton. Has not seen patient recently.   Dr Oviedo referred pt to Dr. Hinton for orthopedic surgery. Pt went to [facility] on 5/3/19 to have Left L5-S1 laminectomy, foraminotomies and medial facetectomy with metrx with Dr. Mackey, but surgery was cancelled due to pt hx of alcohol use. Dr. Mackey declined operating. Pt sts he currently drinks 12 cans of beer daily throughout the day. Pt sts he also smokes marijuana once a month to control pain.     Back Pain   This is a chronic problem. The current episode started more than 1 year ago. The  problem occurs constantly. The problem has been gradually worsening (variable since last office visit) since onset. The pain is present in the lumbar spine and sacro-iliac. The quality of the pain is described as aching. The pain is at a severity of 6/10. The pain is moderate. The pain is worse during the day. The symptoms are aggravated by bending, sitting and position. Pertinent negatives include no abdominal pain, bladder incontinence, bowel incontinence, chest pain, headaches, numbness or weakness. Risk factors include sedentary lifestyle and lack of exercise. He has tried bed rest, heat, home exercises, ice, NSAIDs and walking for the symptoms. The treatment provided mild relief.      Procedure List  -- 3-3-30-- TF TOM left L5  --2-4-20-- TF TOM right L2 and L5  -- 12-8-19-- LEFT SI-- 20% relief  --11-13-19-- left piriformis  -- 10-10-19-- left piriformis    PEG Assessment   What number best describes your pain on average in the past week?6  What number best describes how, during the past week, pain has interfered with your enjoyment of life?6  What number best describes how, during the past week, pain has interfered with your general activity?  6    The following portions of the patient's history were reviewed and updated as appropriate: allergies, current medications, past family history, past medical history, past social history, past surgical history and problem list.    Review of Systems   Constitutional: Positive for activity change (dec). Negative for fatigue.   HENT: Negative for congestion.    Eyes: Negative for visual disturbance.   Respiratory: Negative for cough and shortness of breath.    Cardiovascular: Negative for chest pain.   Gastrointestinal: Negative for abdominal pain, bowel incontinence, constipation and diarrhea.   Genitourinary: Negative for bladder incontinence and difficulty urinating.   Musculoskeletal: Positive for arthralgias (left hip left leg) and back pain. Negative for gait  "problem.   Allergic/Immunologic: Negative for immunocompromised state.   Neurological: Negative for dizziness, weakness, light-headedness, numbness and headaches.   Psychiatric/Behavioral: Negative for agitation, sleep disturbance and suicidal ideas. The patient is not nervous/anxious.        Vitals:    06/02/20 1054   BP: 143/85   Pulse: 85   Resp: 20   Temp: 98.4 °F (36.9 °C)   SpO2: 95%   Weight: 71.2 kg (157 lb)   Height: 172.7 cm (67.99\")   PainSc:   6   PainLoc: Back       Objective   Physical Exam   Constitutional: He is oriented to person, place, and time. Vital signs are normal. He appears well-developed and well-nourished. He is cooperative.   HENT:   Head: Normocephalic and atraumatic.   Nose: Nose normal.   Eyes: Conjunctivae and lids are normal.   Neck: Trachea normal. Neck supple.   Cardiovascular: Normal rate.   Pulmonary/Chest: Effort normal.   Abdominal: Normal appearance.   Musculoskeletal:        Lumbar back: He exhibits decreased range of motion, tenderness and bony tenderness (mild left L4-S1 facet tenderness).   NO bilateral SI joint tenderness    Moderate tenderness of left piriformis, negative  Of right.     Neurological: He is alert and oriented to person, place, and time. Gait normal.   Skin: Skin is warm, dry and intact.   Psychiatric: He has a normal mood and affect. His speech is normal and behavior is normal. Judgment and thought content normal. Cognition and memory are normal.   Nursing note and vitals reviewed.      Assessment/Plan   Jose was seen today for back pain.    Diagnoses and all orders for this visit:    Other chronic pain    Spinal stenosis of lumbar region with neurogenic claudication    Piriformis muscle pain  -     Case Request      --- left piriformis injection. No blood thinners. Reviewed the procedure at length with the patient.  Included in the review was expectations, complications, risk and benefits.The procedure was described in detail and the risks, benefits " and alternatives were discussed with the patient (including but not limited to: bleeding, infection, nerve damage, worsening of pain, inability to perform injection, paralysis, seizures, and death) who agreed to proceed.  Discussed the potential for sedation if warranted/wanted.  The procedure will plan to be performed at [facility] with fluoroscopic guidance(unless ultrasound is indicated) and could potentially have steroids and contrast dye used. Questions were answered and in a way the patient could understand.  Patient verbalized understanding and wishes to proceed.  This intervention will be ordered.  Discussed with patient that all procedures are part of a multimodal plan of care and include either formal PT or a home exercise program.  Patient has no evidence of coagulopathy or current infection.    --- Follow-up after procedure or sooner if needed.         YUNIEL REPORT      YUNIEL report has been reviewed and scanned into the patient's chart.    As the clinician, I personally reviewed the YUNIEL from 6-1-20 .          EMR Dragon/Transcription disclaimer:   Much of this encounter note is an electronic transcription/translation of spoken language to printed text. The electronic translation of spoken language may permit erroneous, or at times, nonsensical words or phrases to be inadvertently transcribed; Although I have reviewed the note for such errors, some may still exist.

## 2020-06-08 ENCOUNTER — LAB REQUISITION (OUTPATIENT)
Dept: LAB | Facility: HOSPITAL | Age: 70
End: 2020-06-08

## 2020-06-08 DIAGNOSIS — Z00.00 ENCOUNTER FOR GENERAL ADULT MEDICAL EXAMINATION WITHOUT ABNORMAL FINDINGS: ICD-10-CM

## 2020-06-08 PROCEDURE — U0004 COV-19 TEST NON-CDC HGH THRU: HCPCS | Performed by: ANESTHESIOLOGY

## 2020-06-09 LAB
REF LAB TEST METHOD: NORMAL
SARS-COV-2 RNA RESP QL NAA+PROBE: NOT DETECTED

## 2020-06-10 ENCOUNTER — OUTSIDE FACILITY SERVICE (OUTPATIENT)
Dept: PAIN MEDICINE | Facility: CLINIC | Age: 70
End: 2020-06-10

## 2020-06-10 ENCOUNTER — DOCUMENTATION (OUTPATIENT)
Dept: PAIN MEDICINE | Facility: CLINIC | Age: 70
End: 2020-06-10

## 2020-06-10 PROCEDURE — 20552 NJX 1/MLT TRIGGER POINT 1/2: CPT | Performed by: ANESTHESIOLOGY

## 2020-06-10 NOTE — PROGRESS NOTES
Piriformis Injection  (unilateral)  [facility]      PREOPERATIVE DIAGNOSIS:  Piriformis syndrome, myofascial pain syndrome.    POSTOPERATIVE DIAGNOSIS:  Same as preop diagnosis    PROCEDURE:   Diagnostic Piriformis Injection at the on the left     PRE-PROCEDURE DISCUSSION WITH PATIENT:    Risks and complications were discussed with the patient prior to starting the procedure and informed consent was obtained.  We discussed various topics including but not limited to bleeding, infection, injury, nerve injury, paralysis, coma, death, postprocedural painful flare-up, postprocedural site soreness, and a lack of pain relief.  We discussed the diagnostic aspect of piriformis injection and the potential therapy of this type of a trigger point injection.    SURGEON:  Steven Corcoran MD    REASON FOR PROCEDURE:      This patient does display a positive piriformis sign on exam, and has tenderness in the area that is overlying the distribution of the piriformis on exam under x-ray    SEDATION:  Versed 6mg IV  ANESTHETIC:  Marcaine 0.5%  STEROID:  Methylprednisolone (DEPO MEDROL) 40mg/ml    DESCRIPTON OF PROCEDURE:  After obtaining informed consent, an I.V. was started in the preoperative area. The patient taken to the operating room and was placed in the prone position with a pillow under the abdomen.  All pressure points were well padded.  EKG, blood pressure, and pulse oximeter were monitored.  The lumbar area was prepped with Chloraprep and draped in a sterile fashion.     Under fluoroscopic guidance the point just lateral to the inferiormost point of the sacroiliac was visualized, which is also at the cephalad aspect of the sciatic notch.  Point was marked overlying the margin of the periosteum there.  This point was anesthetized with subcutaneous Lidocaine 1%, and then a spinal needle was advanced to contact this margin of the bone.  The needle was walked caudad off the bone and advanced about 1-2mm  further.  Resistance was felt upon entering this muscular trigger point.  Aspiration was checked and was negative.   Next, 1 mL of Omnipaque was injected. After seeing adequate spread in the corresponding piriformis muscle, flowing toward the greater trocanter, a total volume 2.5mL of injectate containing above mentioned local anesthetic solution was injected into the trigger point.    The needle was removed intact.  Vital signs were stable throughout.        ESTIMATED BLOOD LOSS:  minimal  SPECIMENS:  none    COMPLICATIONS:   No complications were noted., There was no indication of vascular uptake on live injection of contrast dye. and The patient did not have any signs of postprocedure numbness nor weakness.    TOLERANCE & DISCHARGE CONDITION:    The patient tolerated the procedure well.  The patient was transported to the recovery area without difficulties.  The patient was discharged to home under the care of family in stable and satisfactory condition.    PLAN OF CARE:  1. The patient was given our standard instruction sheet.  2. The patient will Return to clinic 2-3 wks.  3. The patient will resume all medications as per the medication reconciliation sheet.

## 2020-07-15 ENCOUNTER — OFFICE VISIT (OUTPATIENT)
Dept: PAIN MEDICINE | Facility: CLINIC | Age: 70
End: 2020-07-15

## 2020-07-15 DIAGNOSIS — G89.29 OTHER CHRONIC PAIN: Primary | ICD-10-CM

## 2020-07-15 DIAGNOSIS — M48.062 SPINAL STENOSIS OF LUMBAR REGION WITH NEUROGENIC CLAUDICATION: ICD-10-CM

## 2020-07-15 DIAGNOSIS — M54.16 LUMBAR RADICULOPATHY: ICD-10-CM

## 2020-07-15 PROCEDURE — 99441 PR PHYS/QHP TELEPHONE EVALUATION 5-10 MIN: CPT | Performed by: NURSE PRACTITIONER

## 2020-07-15 NOTE — PROGRESS NOTES
TELEPHONE VISIT    You have chosen to receive care through a telephone visit. Do you consent to use a telephone visit for your medical care today? Yes    CHIEF COMPLAINT  Back and leg pain    Subjective   Jose Villafuerte is a 70 y.o. male  who presents for a telephonic follow-up.He has a history of chronic back and leg pain. Reports his pain has been variable since last evaluation.    Patient presents for follow-up of PROCEDURE. Patient had a left piriformis performed by Dr. HERNANDEZ on 6-10-20 for management of LEFT BUTTOCK PAIN. Patient reports 50% relief from the procedure for approximately 10-14 days.    Complains of pain in his left low back, buttock, hip and leg. Today his pain is 7/10VAS. Describes his pain as continuous throbbing. Worsens as day progresses. Walks daily on treadmill. Also has a home inversion table, notes mild to moderate relief. Notes improved sleep after using.     Patient had a TF TOM left L5 performed by Dr. HERNANDEZ on 3-3-20 for management of LOW BACK AND LEFT LEG PAIN. Patient reports 70% relief from the procedure for 2 weeks.  Patient had a TF TOM right L2 and L5 performed by Dr. HERNANDEZ on 2-4-20 for management of LOW BACK AND LEG PAIN. Patient reports MODERATE relief from the procedure.    Saw Dr Mackey. Plan was for surgery but that was cancelled(May 2019). Referred to pain management by Dr Hinton. Has not seen patient recently.   Dr Oviedo referred pt to Dr. Hinton for orthopedic surgery. Pt went to [facility] [place], operated by [facility] on 5/3/19 to have Left L5-S1 laminectomy, foraminotomies and medial facetectomy with metrx with Dr. Mackey, but surgery was cancelled due to pt hx of alcohol use. Dr. Mackey declined operating. Pt sts he currently drinks 12 cans of beer daily throughout the day. Pt sts he also smokes marijuana once a month to control pain.     Recently started taking Prevegan.    Back Pain   This is a chronic problem. The current episode started more than 1 year ago. The problem  occurs constantly. The problem has been gradually worsening since onset. The pain is present in the lumbar spine and sacro-iliac. The quality of the pain is described as aching. The pain is moderate. The pain is worse during the day. The symptoms are aggravated by bending, sitting and position. Pertinent negatives include no bladder incontinence, bowel incontinence, chest pain, dysuria, fever, headaches, numbness or weakness. Risk factors include sedentary lifestyle and lack of exercise. He has tried bed rest, heat, home exercises, ice, NSAIDs and walking for the symptoms. The treatment provided mild relief.      Procedure List  -- 6-10-20-- left piriformis  -- 3-3-30-- TF TOM left L5  -- 2-4-20-- TF TOM right L2 and L5  -- 12-8-19-- LEFT SI-- 20% relief  -- 11-13-19-- left piriformis  -- 10-10-19-- left piriformis    The following portions of the patient's history were reviewed and updated as appropriate: allergies, current medications, past family history, past medical history, past social history, past surgical history and problem list.    Review of Systems   Constitutional: Negative for chills and fever.   Respiratory: Negative for cough, chest tightness and shortness of breath.    Cardiovascular: Negative for chest pain.   Gastrointestinal: Negative for bowel incontinence, diarrhea, nausea and vomiting.   Genitourinary: Negative for bladder incontinence, difficulty urinating and dysuria.   Musculoskeletal: Positive for back pain and gait problem.   Neurological: Negative for weakness, numbness and headaches.   Psychiatric/Behavioral: Negative for sleep disturbance. The patient is not nervous/anxious.      Vitals:    07/15/20 1036   PainSc:   7   PainLoc: Back       Objective   Physical Exam  As this is a telephone check-in, the ability to perform a routine physical exam is extremely limited. The patient seems alert and is oriented appropriately.   On this phone call there is not any evidence of respiratory  distress.   The patient seems of normal mood.   The remainder of a routine physical exam is deferred.      Assessment/Plan   Diagnoses and all orders for this visit:    Other chronic pain    Spinal stenosis of lumbar region with neurogenic claudication  -     Case Request    Lumbar radiculopathy  -     Case Request    ----------------    Our practice is offering alternative &/or electronic methods to continue to follow our patients while at the same time further the efforts toward social distancing, in accordance with our organizational policies, professional societies' guidance, and [facility] mandates.  I support the Healthy at Home campaign and in this visit I have counseled the patient on our needs to limit in-person office visits and physical encounters with medical facilities whenever possible.  I have also educated the patient on the medical necessities of maintaining social distancing while we continue to function during this crisis period.      The patient was counseled on the need to consider telehealth options. The patient was offered the opportunity for a Video Visit using [employer]. The patient had obstacles which preclude consideration for a Video Visit. As a Video Visit was not practical, the patient was offered the option for a Telephone Check-In. The patient agreed to a Telephone Check-In. The patient was counseled on the need for a check-in visit.    TIME:  Total Time:  9 minutes. Topics discussed are outlined in the Assessment/Plan section of the note.    ----------------    --- Repeat left piriformis after 9-10-20.  --- Repeat TF TOM left L5. No blood thinners. Reviewed the procedure at length with the patient.  Included in the review was expectations, complications, risk and benefits.The procedure was described in detail and the risks, benefits and alternatives were discussed with the patient (including but not limited to: bleeding, infection, nerve damage, worsening of pain, inability to  perform injection, paralysis, seizures, and death) who agreed to proceed.  Discussed the potential for sedation if warranted/wanted.  The procedure will plan to be performed at [facility] with fluoroscopic guidance(unless ultrasound is indicated) and could potentially have steroids and contrast dye used. Questions were answered and in a way the patient could understand.  Patient verbalized understanding and wishes to proceed.  This intervention will be ordered.  Discussed with patient that all procedures are part of a multimodal plan of care and include either formal PT or a home exercise program.  Patient has no evidence of coagulopathy or current infection.  --- Follow-up after procedure or sooner if needed.       YUNIEL REPORT    YUNIEL report has been reviewed and scanned into the patient's chart.    As the clinician, I personally reviewed the YUNIEL from 7-14-20 while the patient was on the telephonic visit today.    -------    EMR Dragon/Transcription disclaimer:   Much of this encounter note is an electronic transcription/translation of spoken language to printed text. The electronic translation of spoken language may permit erroneous, or at times, nonsensical words or phrases to be inadvertently transcribed; Although I have reviewed the note for such errors, some may still exist.

## 2020-08-10 ENCOUNTER — LAB REQUISITION (OUTPATIENT)
Dept: LAB | Facility: HOSPITAL | Age: 70
End: 2020-08-10

## 2020-08-10 DIAGNOSIS — Z00.00 ENCOUNTER FOR GENERAL ADULT MEDICAL EXAMINATION WITHOUT ABNORMAL FINDINGS: ICD-10-CM

## 2020-08-11 PROCEDURE — U0004 COV-19 TEST NON-CDC HGH THRU: HCPCS | Performed by: ANESTHESIOLOGY

## 2020-08-12 LAB
REF LAB TEST METHOD: NORMAL
SARS-COV-2 RNA RESP QL NAA+PROBE: NOT DETECTED

## 2020-08-13 ENCOUNTER — DOCUMENTATION (OUTPATIENT)
Dept: PAIN MEDICINE | Facility: CLINIC | Age: 70
End: 2020-08-13

## 2020-08-13 ENCOUNTER — OUTSIDE FACILITY SERVICE (OUTPATIENT)
Dept: PAIN MEDICINE | Facility: CLINIC | Age: 70
End: 2020-08-13

## 2020-08-13 PROCEDURE — 64483 NJX AA&/STRD TFRM EPI L/S 1: CPT | Performed by: ANESTHESIOLOGY

## 2020-08-18 NOTE — PROGRESS NOTES
Lumbar Transforaminal Epidural Steroid Injection (one level Unilateral)  [facility]      PREOPERATIVE DIAGNOSIS:  Lumbar Spinal Stenosis WITH Neurogenic Claudication and left Lumbar Radiculopathy    POSTOPERATIVE DIAGNOSIS:  Same as preop diagnosis    PROCEDURE:  CPT 25263 --  Diagnostic Transforaminal Epidural Steroid Injection at the L5 level, on the left     PRE-PROCEDURE DISCUSSION WITH PATIENT:    Risks and complications were discussed with the patient prior to starting the procedure and informed consent was obtained.  We discussed various topics including but not limited to bleeding, infection, injury, nerve injury, paralysis, coma, death, postprocedural painful flare-up, postprocedural site soreness, and a lack of pain relief.  We discussed the diagnostic aspect of transforaminal epidural / selective nerve root blockade.    SURGEON:  Steven Corcoran MD    REASON FOR PROCEDURE:    Diagnostic injection at this level is needed, Previous clinically significant therapeutic effect is noted., Degenerative changes are noted in the area., Radiating pattern of pain is likely consistent with degenerative changes in the area. and Radicular pain pattern seems consistent with this dermatome.    SEDATION:  Versed 6mg & Fentanyl 50 mcg IV  ANESTHETIC:  Marcaine 0.25%  STEROID:  Methylprednisolone (DEPO MEDROL) 80mg/ml    DESCRIPTON OF PROCEDURE:  After obtaining informed consent, an I.V. was started in the preoperative area. The patient taken to the operating room and was placed in the prone position with a pillow under the abdomen.  All pressure points were well padded.  EKG, blood pressure, and pulse oximeter were monitored.  The lumbar area was prepped with Chloraprep and draped in a sterile fashion. Under fluoroscopic guidance in an oblique dimension on the above mentioned side, the transverse process of the aforementioned vertebra at the junction of the body at 6 o'clock position was identified.  Skin and subcutaneous tissue was anesthetized with 1% lidocaine. A 22-gauge spinal needle was introduced under fluoroscopic guidance at the above junction into the foramen without parasthesias and into the epidural space. After confirming the position of the needle with PA, lateral, and oblique fluoroscopic views, aspiration was checked and was clear of blood or CSF.  Next, 1 mL of Omnipaque was injected. After seeing adequate spread on the corresponding nerve root, a total volume 3mL of injectate containing 1ml of the above mentioned local anesthetic, 1 ml saline,  and the above mentioned corticosteroid was injected into the epidural space.    The needle was removed intact.  Vital signs were stable throughout.        ESTIMATED BLOOD LOSS:  <5 mL  SPECIMENS:  none    COMPLICATIONS:   No complications were noted., There was no indication of vascular uptake on live injection of contrast dye., There was no indication of intrathecal uptake on live injection of contrast dye., There was not any evidence of dural puncture.   and The patient did not have any signs of postprocedure numbness nor weakness.    TOLERANCE & DISCHARGE CONDITION:    The patient tolerated the procedure well.  The patient was transported to the recovery area without difficulties.  The patient was discharged to home under the care of family in stable and satisfactory condition.    PLAN OF CARE:  1. The patient was given our standard instruction sheet.  2. The patient will wait at least 4 wks before having another therapeutic procedure and Plan for piriformis injection in a month.  3. The patient will resume all medications as per the medication reconciliation sheet.

## 2020-09-03 DIAGNOSIS — I10 ESSENTIAL HYPERTENSION: ICD-10-CM

## 2020-09-03 RX ORDER — LISINOPRIL 40 MG/1
TABLET ORAL
Qty: 90 TABLET | Refills: 0 | Status: SHIPPED | OUTPATIENT
Start: 2020-09-03 | End: 2020-11-18 | Stop reason: SDUPTHER

## 2020-09-11 ENCOUNTER — LAB REQUISITION (OUTPATIENT)
Dept: LAB | Facility: HOSPITAL | Age: 70
End: 2020-09-11

## 2020-09-11 DIAGNOSIS — Z00.00 ENCOUNTER FOR GENERAL ADULT MEDICAL EXAMINATION WITHOUT ABNORMAL FINDINGS: ICD-10-CM

## 2020-09-12 PROCEDURE — U0004 COV-19 TEST NON-CDC HGH THRU: HCPCS | Performed by: ANESTHESIOLOGY

## 2020-09-14 LAB — SARS-COV-2 RNA RESP QL NAA+PROBE: NOT DETECTED

## 2020-09-15 ENCOUNTER — DOCUMENTATION (OUTPATIENT)
Dept: PAIN MEDICINE | Facility: CLINIC | Age: 70
End: 2020-09-15

## 2020-09-15 ENCOUNTER — OUTSIDE FACILITY SERVICE (OUTPATIENT)
Dept: PAIN MEDICINE | Facility: CLINIC | Age: 70
End: 2020-09-15

## 2020-09-15 PROCEDURE — 20552 NJX 1/MLT TRIGGER POINT 1/2: CPT | Performed by: ANESTHESIOLOGY

## 2020-09-15 PROCEDURE — 64483 NJX AA&/STRD TFRM EPI L/S 1: CPT | Performed by: ANESTHESIOLOGY

## 2020-09-15 NOTE — PROGRESS NOTES
TWO INDEPENDENT PROCEDURES:  ltfesi & piriformis    -------        Lumbar Transforaminal Epidural Steroid Injection (one level Unilateral)  [facility]      PREOPERATIVE DIAGNOSIS:  Lumbar Spinal Stenosis WITH Neurogenic Claudication and left Lumbar Radiculopathy    POSTOPERATIVE DIAGNOSIS:  Same as preop diagnosis    PROCEDURE:  CPT 53650 --  Diagnostic Transforaminal Epidural Steroid Injection at the L5 level, on the left     PRE-PROCEDURE DISCUSSION WITH PATIENT:    Risks and complications were discussed with the patient prior to starting the procedure and informed consent was obtained.  We discussed various topics including but not limited to bleeding, infection, injury, nerve injury, paralysis, coma, death, postprocedural painful flare-up, postprocedural site soreness, and a lack of pain relief.  We discussed the diagnostic aspect of transforaminal epidural / selective nerve root blockade.    SURGEON:  Steven Corcoran MD    REASON FOR PROCEDURE:    Previous clinically significant therapeutic effect is noted., Degenerative changes are noted in the area., Radiating pattern of pain is likely consistent with degenerative changes in the area. and Radicular pain pattern seems consistent with this dermatome.    SEDATION:  Versed 6mg & Fentanyl 50 mcg IV  ANESTHETIC:  Marcaine 0.25%  STEROID:  Methylprednisolone (DEPO MEDROL) 80mg/ml    DESCRIPTON OF PROCEDURE:  After obtaining informed consent, an I.V. was started in the preoperative area. The patient taken to the operating room and was placed in the prone position with a pillow under the abdomen.  All pressure points were well padded.  EKG, blood pressure, and pulse oximeter were monitored.  The lumbar area was prepped with Chloraprep and draped in a sterile fashion. Under fluoroscopic guidance in an oblique dimension on the above mentioned side, the transverse process of the aforementioned vertebra at the junction of the body at 6 o'clock  position was identified. Skin and subcutaneous tissue was anesthetized with 1% lidocaine. A 22-gauge spinal needle was introduced under fluoroscopic guidance at the above junction into the foramen without parasthesias and into the epidural space. After confirming the position of the needle with PA, lateral, and oblique fluoroscopic views, aspiration was checked and was clear of blood or CSF.  Next, 1 mL of Omnipaque was injected. After seeing adequate spread on the corresponding nerve root, a total volume 3mL of injectate containing 1ml of the above mentioned local anesthetic, 1 ml saline,  and the above mentioned corticosteroid was injected into the epidural space.    The needle was removed intact.  Vital signs were stable throughout.        ESTIMATED BLOOD LOSS:  <5 mL  SPECIMENS:  none    COMPLICATIONS:   No complications were noted., There was no indication of vascular uptake on live injection of contrast dye. and HOWEVER of note he had significant parasthesia upon injection of contrast dye.  It was not intraneural.  The neurogram/epidurogram is quite abnormal though, with significant Left L5 root narrowing.    TOLERANCE & DISCHARGE CONDITION:    The patient tolerated the procedure well.  The patient was transported to the recovery area without difficulties.  The patient was discharged to home under the care of family in stable and satisfactory condition.    PLAN OF CARE:  1. The patient was given our standard instruction sheet.  2. The patient will Return to clinic 2 wks.  3. The patient will resume all medications as per the medication reconciliation sheet.        -----------    Piriformis Injection  (unilateral)  [facility]      PREOPERATIVE DIAGNOSIS:  Piriformis syndrome, myofascial pain syndrome.    POSTOPERATIVE DIAGNOSIS:  Same as preop diagnosis    PROCEDURE:   Diagnostic Piriformis Injection at the on the left     PRE-PROCEDURE DISCUSSION WITH PATIENT:    Risks and complications  were discussed with the patient prior to starting the procedure and informed consent was obtained.  We discussed various topics including but not limited to bleeding, infection, injury, nerve injury, paralysis, coma, death, postprocedural painful flare-up, postprocedural site soreness, and a lack of pain relief.  We discussed the diagnostic aspect of piriformis injection and the potential therapy of this type of a trigger point injection.    SURGEON:  Steven Corcoran MD    REASON FOR PROCEDURE:    It was in the patient's best interest to treat both painful areas at the same visit today.  Better to avoid multiple medical facility exposures.  This patient does display a positive piriformis sign on exam, and has tenderness in the area that is overlying the distribution of the piriformis on exam under x-ray    SEDATION:  as above  ANESTHETIC:  Marcaine 0.5%  STEROID:  Methylprednisolone (DEPO MEDROL) 40mg/ml    DESCRIPTON OF PROCEDURE:  After obtaining informed consent, an I.V. was started in the preoperative area. The patient taken to the operating room and was placed in the prone position with a pillow under the abdomen.  All pressure points were well padded.  EKG, blood pressure, and pulse oximeter were monitored.  The lumbar area was prepped with Chloraprep and draped in a sterile fashion.     Under fluoroscopic guidance the point just lateral to the inferiormost point of the sacroiliac was visualized, which is also at the cephalad aspect of the sciatic notch.  Point was marked overlying the margin of the periosteum there.  This point was anesthetized with subcutaneous Lidocaine 1%, and then a spinal needle was advanced to contact this margin of the bone.  The needle was walked caudad off the bone and advanced about 1-2mm further.  Resistance was felt upon entering this muscular trigger point.  Aspiration was checked and was negative.   Next, 1 mL of Omnipaque was injected. After seeing adequate spread in the  corresponding piriformis muscle, flowing toward the greater trocanter, a total volume 2.5mL of injectate containing above mentioned local anesthetic solution was injected into the trigger point.    The needle was removed intact.  Vital signs were stable throughout.        ESTIMATED BLOOD LOSS:  minimal  SPECIMENS:  none    COMPLICATIONS:   No complications were noted., There was no indication of vascular uptake on live injection of contrast dye. and The patient did not have any signs of postprocedure numbness nor weakness.    TOLERANCE & DISCHARGE CONDITION:    The patient tolerated the procedure well.  The patient was transported to the recovery area without difficulties.  The patient was discharged to home under the care of family in stable and satisfactory condition.    PLAN OF CARE:  4. The patient was given our standard instruction sheet.  5. The patient will Return to clinic 2 wks.  6. The patient will resume all medications as per the medication reconciliation sheet.

## 2020-09-29 ENCOUNTER — OFFICE VISIT (OUTPATIENT)
Dept: PAIN MEDICINE | Facility: CLINIC | Age: 70
End: 2020-09-29

## 2020-09-29 VITALS
HEART RATE: 90 BPM | DIASTOLIC BLOOD PRESSURE: 72 MMHG | OXYGEN SATURATION: 97 % | HEIGHT: 68 IN | BODY MASS INDEX: 22.76 KG/M2 | WEIGHT: 150.2 LBS | RESPIRATION RATE: 20 BRPM | SYSTOLIC BLOOD PRESSURE: 136 MMHG | TEMPERATURE: 98.2 F

## 2020-09-29 DIAGNOSIS — M48.062 SPINAL STENOSIS OF LUMBAR REGION WITH NEUROGENIC CLAUDICATION: ICD-10-CM

## 2020-09-29 DIAGNOSIS — G89.29 OTHER CHRONIC PAIN: Primary | ICD-10-CM

## 2020-09-29 DIAGNOSIS — M79.18 PIRIFORMIS MUSCLE PAIN: ICD-10-CM

## 2020-09-29 PROCEDURE — 99213 OFFICE O/P EST LOW 20 MIN: CPT | Performed by: NURSE PRACTITIONER

## 2020-09-29 NOTE — PROGRESS NOTES
"CHIEF COMPLAINT  F/u back pain. Pt had Lumbar Transforaminal Epidural Steroid Injection (one level Unilateral) on 8/13/20 and sts receiving 70% x 2 weeks then pt had ltfesi & piriformis on 9/15/20 and sts receiving 80% relief x 2 weeks and pain is starting to return now.     Subjective   Jose Villafuerte is a 70 y.o. male  who presents for follow-up.  He has a history of chronic back and leg pain. Reports his pain is improved since last evaluation.    Patient presents for follow-up of PROCEDURE. Patient had a TF TOM left L5 and left piriformis performed by Dr. HERNANDEZ on 9-15-20 for management of LEFT LEG AND LOW BACK PAIN. Patient reports 80% relief from the procedure for 2 weeks.    Patient presents for follow-up of PROCEDURE. Patient had a TF TOM left L5 performed by Dr. HERNANDEZ on 8-15-20 for management of LOW BACK ND LEFT LEG PAIN. Patient reports 70% relief from the procedure for 2 weeks. Pain has not returned to pre-procedure level.    Complains of pain in his low back, left buttock and left leg. Today his pain is 5/10VAS.     Saw Dr Mackey. Plan was for surgery but that was cancelled(May 2019). Referred to pain management by Dr Hinton. Has not seen patient recently.   Dr Oviedo referred pt to Dr. Hinton for orthopedic surgery. Pt went to [facility] on 5/3/19 to have Left L5-S1 laminectomy, foraminotomies and medial facetectomy with metrx with Dr. Mackey, but surgery was cancelled due to pt hx of alcohol use. Dr. Mackey declined operating. Pt sts he currently drinks 12 cans of beer daily throughout the day. Pt sts he also smokes marijuana once a month to control pain.     Brother passed away from COVID a few months ago in [place], [place].    \"I think I can get by with just those piriformis injections every so often.\"    Patient remained masked during entire encounter. No cough present. I donned a mask and eye protection throughout entire visit. Prior to donning mask and eye protection, hand hygiene was " performed, as well as when it was doffed.  I was closer than 6 feet, but not for an extended period of time. No obvious exposure to any bodily fluids.    Back Pain  This is a chronic problem. The current episode started more than 1 year ago. The problem occurs constantly. The problem has been gradually worsening (improved since last evaluation) since onset. The pain is present in the lumbar spine and sacro-iliac. The quality of the pain is described as aching. The pain is at a severity of 5/10. The pain is moderate. The pain is worse during the day. The symptoms are aggravated by bending, sitting and position. Pertinent negatives include no bladder incontinence, bowel incontinence, chest pain, dysuria, fever, headaches, numbness or weakness. Risk factors include sedentary lifestyle and lack of exercise. He has tried bed rest, heat, home exercises, ice, NSAIDs and walking for the symptoms. The treatment provided mild relief.      Procedure List  -- 9-15-20-- TF TOM left L5 and left piriformis  -- 8-13-20-- 8-13-20-- TF TOM left L5  -- 6-10-20-- left piriformis  -- 3-3-30-- TF TOM left L5  -- 2-4-20-- TF TOM right L2 and L5  -- 12-8-19-- LEFT SI-- 20% relief  -- 11-13-19-- left piriformis  -- 10-10-19-- left piriformis    PEG Assessment   What number best describes your pain on average in the past week?5  What number best describes how, during the past week, pain has interfered with your enjoyment of life?5  What number best describes how, during the past week, pain has interfered with your general activity?  5    The following portions of the patient's history were reviewed and updated as appropriate: allergies, current medications, past family history, past medical history, past social history, past surgical history and problem list.    Review of Systems   Constitutional: Negative for activity change, fatigue and fever.   HENT: Negative for congestion.    Eyes: Negative for visual disturbance.   Respiratory:  "Negative for cough and shortness of breath.    Cardiovascular: Negative for chest pain.   Gastrointestinal: Negative for bowel incontinence, constipation and diarrhea.   Endocrine: Negative for polyuria.   Genitourinary: Negative for bladder incontinence, difficulty urinating and dysuria.   Musculoskeletal: Positive for back pain.   Neurological: Negative for dizziness, weakness, light-headedness, numbness and headaches.   Psychiatric/Behavioral: Negative for agitation, sleep disturbance and suicidal ideas. The patient is not nervous/anxious.      I have reviewed and confirmed the accuracy of the ROS as documented by the MA/LPN/RN JENNIFER Perez    Vitals:    09/29/20 1126   BP: 136/72   Pulse: 90   Resp: 20   Temp: 98.2 °F (36.8 °C)   SpO2: 97%   Weight: 68.1 kg (150 lb 3.2 oz)   Height: 172.7 cm (67.99\")   PainSc:   5   PainLoc: Back     Objective   Physical Exam  Vitals signs and nursing note reviewed.   Constitutional:       Appearance: Normal appearance. He is well-developed.   HENT:      Head: Normocephalic and atraumatic.      Nose: Nose normal.   Eyes:      General: Lids are normal.      Conjunctiva/sclera: Conjunctivae normal.   Cardiovascular:      Rate and Rhythm: Normal rate.   Pulmonary:      Effort: Pulmonary effort is normal. No respiratory distress.   Musculoskeletal:      Lumbar back: He exhibits decreased range of motion and tenderness.        Legs:    Skin:     General: Skin is warm and dry.   Neurological:      Mental Status: He is alert and oriented to person, place, and time.      Gait: Gait abnormal.   Psychiatric:         Speech: Speech normal.         Behavior: Behavior normal. Behavior is cooperative.         Thought Content: Thought content normal.         Judgment: Judgment normal.         Assessment/Plan   Jose was seen today for back pain.    Diagnoses and all orders for this visit:    Other chronic pain    Spinal stenosis of lumbar region with neurogenic " claudication    Piriformis muscle pain  -     Case Request      -- Left piriformis injection after December 15, 2020. Reviewed the procedure at length with the patient.  Included in the review was expectations, complications, risk and benefits.The procedure was described in detail and the risks, benefits and alternatives were discussed with the patient (including but not limited to: bleeding, infection, nerve damage, worsening of pain, inability to perform injection, paralysis, seizures, and death) who agreed to proceed.  Discussed the potential for sedation if warranted/wanted.  The procedure will plan to be performed at [facility] with fluoroscopic guidance(unless ultrasound is indicated) and could potentially have steroids and contrast dye used. Questions were answered and in a way the patient could understand.  Patient verbalized understanding and wishes to proceed.  This intervention will be ordered.  Discussed with patient that all procedures are part of a multimodal plan of care and include either formal PT or a home exercise program.  Patient has no evidence of coagulopathy or current infection.    --- Follow-up after procedure or sooner if needed.       YUNIEL REPORT    YUNIEL report has been reviewed and scanned into the patient's chart.    As the clinician, I personally reviewed the YUNIEL from 9-29-20 while the patient was in the office today.          EMR Dragon/Transcription disclaimer:   Much of this encounter note is an electronic transcription/translation of spoken language to printed text. The electronic translation of spoken language may permit erroneous, or at times, nonsensical words or phrases to be inadvertently transcribed; Although I have reviewed the note for such errors, some may still exist.

## 2020-11-18 ENCOUNTER — OFFICE VISIT (OUTPATIENT)
Dept: INTERNAL MEDICINE | Facility: CLINIC | Age: 70
End: 2020-11-18

## 2020-11-18 DIAGNOSIS — J32.0 MAXILLARY SINUSITIS, UNSPECIFIED CHRONICITY: Primary | ICD-10-CM

## 2020-11-18 DIAGNOSIS — I10 ESSENTIAL HYPERTENSION: ICD-10-CM

## 2020-11-18 PROCEDURE — 99442 PR PHYS/QHP TELEPHONE EVALUATION 11-20 MIN: CPT | Performed by: FAMILY MEDICINE

## 2020-11-18 RX ORDER — AMOXICILLIN 500 MG/1
500 CAPSULE ORAL 2 TIMES DAILY
Qty: 14 CAPSULE | Refills: 0 | Status: SHIPPED | OUTPATIENT
Start: 2020-11-18 | End: 2021-02-19

## 2020-11-18 RX ORDER — AMLODIPINE BESYLATE 10 MG/1
10 TABLET ORAL DAILY
Qty: 90 TABLET | Refills: 0 | Status: SHIPPED | OUTPATIENT
Start: 2020-11-18 | End: 2020-11-23

## 2020-11-18 RX ORDER — LISINOPRIL 40 MG/1
40 TABLET ORAL DAILY
Qty: 90 TABLET | Refills: 0 | Status: SHIPPED | OUTPATIENT
Start: 2020-11-18 | End: 2021-03-02

## 2020-11-18 NOTE — PROGRESS NOTES
Subjective   Jose Villafuerte is a 70 y.o. male.   You have chosen to receive care through a telephone visit. Do you consent to use a telephone visit for your medical care today? Yes  History of Present Illness   Jose is a 70 year old male for a telehealth visit.  He has been sick for about a week or so.  He went to [facility] on Saturday, 11/14, for his symptoms.  He had a bad cough as well as nasal congestion.  Had a negative chest x-ray in the ER and also a negative COVID test.  He was sent home with a Zpack which he started taking on 1/15.   He was also given a muscle relaxant because his cough was so deep that it was bothering his chest wall.  He doesn't feel much better now but doesn't feel any worse.  Has a lot of nasal congestion and drainage which is yellow in color.  No known fever, no chills or sweats.  Denies shortness of breath.    Also about due for refill on his blood pressure medications.  Will go ahead and send in the refill okay for both of them.  He checked his blood pressure before calling and it was 137/74.  He is due for an appointment for blood pressure check and will schedule that.        The following portions of the patient's history were reviewed and updated as appropriate: allergies, current medications, past medical history, past social history and problem list.    Review of Systems   Constitutional: Negative for chills and fever.   HENT: Positive for congestion, postnasal drip, rhinorrhea and sinus pressure. Negative for ear pain, nosebleeds, sneezing, sore throat and trouble swallowing.    Respiratory: Positive for cough. Negative for chest tightness, shortness of breath and wheezing.    Cardiovascular: Negative for chest pain, palpitations and leg swelling.   Gastrointestinal: Negative for constipation, diarrhea and nausea.   Skin: Negative for rash.       Objective   Physical Exam  Neurological:      General: No focal deficit present.      Mental Status: He is alert and  oriented to person, place, and time.   Psychiatric:         Mood and Affect: Mood normal.         Behavior: Behavior normal.        Vital signs not obtained since this was a telephone visit.  Physical not done for the same reason.  Patient coughing during the visit.  Not short of breath.  Nasal congeston.  Assessment/Plan   Diagnoses and all orders for this visit:    1. Maxillary sinusitis, unspecified chronicity (Primary)  -     amoxicillin (AMOXIL) 500 MG capsule; Take 1 capsule by mouth 2 (Two) Times a Day.  Dispense: 14 capsule; Refill: 0    2. Essential hypertension  -     amLODIPine (NORVASC) 10 MG tablet; Take 1 tablet by mouth Daily.  Dispense: 90 tablet; Refill: 0  -     lisinopril (PRINIVIL,ZESTRIL) 40 MG tablet; Take 1 tablet by mouth Daily.  Dispense: 90 tablet; Refill: 0      He is to schedule an appointment for January for his blood pressure check.    Advised to try Coricidin HBP for the congestion and cough.    Total time spent was 15 minutes.

## 2020-11-23 DIAGNOSIS — I10 ESSENTIAL HYPERTENSION: ICD-10-CM

## 2020-11-23 RX ORDER — AMLODIPINE BESYLATE 10 MG/1
TABLET ORAL
Qty: 90 TABLET | Refills: 0 | Status: SHIPPED | OUTPATIENT
Start: 2020-11-23 | End: 2021-04-23

## 2020-12-14 ENCOUNTER — LAB REQUISITION (OUTPATIENT)
Dept: LAB | Facility: HOSPITAL | Age: 70
End: 2020-12-14

## 2020-12-14 DIAGNOSIS — Z00.00 ENCOUNTER FOR GENERAL ADULT MEDICAL EXAMINATION WITHOUT ABNORMAL FINDINGS: ICD-10-CM

## 2020-12-15 PROCEDURE — U0004 COV-19 TEST NON-CDC HGH THRU: HCPCS | Performed by: ANESTHESIOLOGY

## 2020-12-16 LAB — SARS-COV-2 RNA RESP QL NAA+PROBE: NOT DETECTED

## 2020-12-17 ENCOUNTER — DOCUMENTATION (OUTPATIENT)
Dept: PAIN MEDICINE | Facility: CLINIC | Age: 70
End: 2020-12-17

## 2020-12-17 ENCOUNTER — OUTSIDE FACILITY SERVICE (OUTPATIENT)
Dept: PAIN MEDICINE | Facility: CLINIC | Age: 70
End: 2020-12-17

## 2020-12-17 PROCEDURE — 20552 NJX 1/MLT TRIGGER POINT 1/2: CPT | Performed by: ANESTHESIOLOGY

## 2021-01-05 ENCOUNTER — OFFICE VISIT (OUTPATIENT)
Dept: PAIN MEDICINE | Facility: CLINIC | Age: 71
End: 2021-01-05

## 2021-01-05 VITALS
HEART RATE: 94 BPM | OXYGEN SATURATION: 98 % | HEIGHT: 68 IN | WEIGHT: 143 LBS | TEMPERATURE: 97.5 F | SYSTOLIC BLOOD PRESSURE: 133 MMHG | BODY MASS INDEX: 21.67 KG/M2 | RESPIRATION RATE: 20 BRPM | DIASTOLIC BLOOD PRESSURE: 84 MMHG

## 2021-01-05 DIAGNOSIS — M79.18 PIRIFORMIS MUSCLE PAIN: ICD-10-CM

## 2021-01-05 DIAGNOSIS — M48.062 SPINAL STENOSIS OF LUMBAR REGION WITH NEUROGENIC CLAUDICATION: ICD-10-CM

## 2021-01-05 DIAGNOSIS — G89.29 OTHER CHRONIC PAIN: Primary | ICD-10-CM

## 2021-01-05 PROCEDURE — 99214 OFFICE O/P EST MOD 30 MIN: CPT | Performed by: NURSE PRACTITIONER

## 2021-01-05 RX ORDER — METAXALONE 800 MG/1
800 TABLET ORAL
COMMUNITY
Start: 2020-11-14 | End: 2021-02-19

## 2021-01-05 NOTE — PROGRESS NOTES
CHIEF COMPLAINT  F/u back pain. Pt had Piriformis Injection  (unilateral) and sts receiving 0% relief from procedure.     Subjective   Jose Villafuerte is a 70 y.o. male  who presents for follow-up.  He has a history of chronic low back and leg pain. Reports his pain has been variable since last evaluation.    Patient presents for follow-up of PROCEDURE. Patient had a left piriformis performed by Dr. HERNANDEZ on 12-17-20 for management of LOW BACK AND LEG PAIN. Patient reports 0 relief from the procedure.    Complains of pain in his left low back, left buttock and left leg. Today his pain is 6/10VAS. His buttock and leg pain is his primary complaint.  Describes his pain as continuous stabbing, throbbing and burning. Pain increases with prolonged sitting, bending/lifting/twisting; pain decreases with walking, inversion table. ADL's by self. Denies any bowel or bladder changes.     Saw Dr Mackey. Plan was for surgery but that was cancelled(May 2019). Referred to pain management by Dr Hinton. Has not seen patient recently.   Dr Oviedo referred pt to Dr. Hinton for orthopedic surgery. Pt went to [facility] on [facility]/19 to have Left L5-S1 laminectomy, foraminotomies and medial facetectomy with metrx with Dr. Mackey, but surgery was cancelled due to pt hx of alcohol use. Dr. Mackey declined operating. Pt sts he currently drinks 12 cans of beer daily throughout the day. Pt sts he also smokes marijuana once a month to control pain.     Patient remained masked during entire encounter. No cough present. I donned a mask and eye protection throughout entire visit. Prior to donning mask and eye protection, hand hygiene was performed, as well as when it was doffed.  I was closer than 6 feet, but not for an extended period of time. No obvious exposure to any bodily fluids.    Back Pain  This is a chronic problem. The current episode started more than 1 year ago. The problem occurs constantly. The problem has been gradually  worsening since onset. The pain is present in the lumbar spine and sacro-iliac. The quality of the pain is described as aching. The pain is at a severity of 6/10. The pain is moderate. The pain is worse during the day. The symptoms are aggravated by bending, sitting and position. Pertinent negatives include no bladder incontinence, bowel incontinence, chest pain, dysuria, fever, headaches, numbness or weakness. Risk factors include sedentary lifestyle and lack of exercise. He has tried bed rest, heat, home exercises, ice, NSAIDs and walking for the symptoms. The treatment provided mild relief.      Procedure List  -- 12-17-20-- left piriformis  -- 9-15-20-- TF TOM left L5 and left piriformis  -- 8-13-20--  TF TOM left L5  -- 6-10-20-- left piriformis  -- 3-3-30-- TF TOM left L5  -- 2-4-20-- TF TOM right L2 and L5  -- 12-8-19-- LEFT SI-- 20% relief  -- 11-13-19-- left piriformis  -- 10-10-19-- left piriformis     PEG Assessment   What number best describes your pain on average in the past week?7  What number best describes how, during the past week, pain has interfered with your enjoyment of life?7  What number best describes how, during the past week, pain has interfered with your general activity?  7    The following portions of the patient's history were reviewed and updated as appropriate: allergies, current medications, past family history, past medical history, past social history, past surgical history and problem list.    Review of Systems   Constitutional: Negative for activity change, fatigue and fever.   HENT: Negative for congestion.    Eyes: Negative for visual disturbance.   Respiratory: Negative for apnea, cough and shortness of breath.    Cardiovascular: Negative for chest pain.   Gastrointestinal: Negative for bowel incontinence, constipation and diarrhea.   Genitourinary: Negative for bladder incontinence, difficulty urinating and dysuria.   Musculoskeletal: Positive for back pain.  "  Allergic/Immunologic: Negative for immunocompromised state.   Neurological: Negative for dizziness, weakness, light-headedness, numbness and headaches.   Psychiatric/Behavioral: Negative for agitation, sleep disturbance and suicidal ideas. The patient is not nervous/anxious.      I have reviewed and confirmed the accuracy of the ROS as documented by the MA/LPN/RN Ryann Manning, JENNIFER      Vitals:    01/05/21 0943   BP: 133/84   Pulse: 94   Resp: 20   Temp: 97.5 °F (36.4 °C)   SpO2: 98%   Weight: 64.9 kg (143 lb)   Height: 172.7 cm (67.99\")   PainSc:   6   PainLoc: Back         Objective   Physical Exam  Vitals signs and nursing note reviewed.   Constitutional:       Appearance: Normal appearance. He is well-developed.   HENT:      Head: Normocephalic and atraumatic.   Cardiovascular:      Rate and Rhythm: Normal rate.   Pulmonary:      Effort: Pulmonary effort is normal. No respiratory distress.   Musculoskeletal:      Lumbar back: He exhibits decreased range of motion and tenderness.        Legs:       Comments: + SLR on left, negative on right   Skin:     General: Skin is warm and dry.   Neurological:      Mental Status: He is alert.      Gait: Gait abnormal.   Psychiatric:         Speech: Speech normal.         Behavior: Behavior normal. Behavior is cooperative.         Thought Content: Thought content normal.         Judgment: Judgment normal.         Assessment/Plan   Diagnoses and all orders for this visit:    1. Other chronic pain (Primary)    2. Spinal stenosis of lumbar region with neurogenic claudication  -     Case Request  -     MRI Lumbar Spine Without Contrast; Future    3. Piriformis muscle pain      --- TF TOM left L5 to be performed after 2-6-21. No blood thinners. Reviewed the procedure at length with the patient.  Included in the review was expectations, complications, risk and benefits.The procedure was described in detail and the risks, benefits and alternatives were discussed with the " patient (including but not limited to: bleeding, infection, nerve damage, worsening of pain, inability to perform injection, paralysis, seizures, and death) who agreed to proceed.  Discussed the potential for sedation if warranted/wanted.  The procedure will plan to be performed at [facility] with fluoroscopic guidance(unless ultrasound is indicated) and could potentially have steroids and contrast dye used. Questions were answered and in a way the patient could understand.  Patient verbalized understanding and wishes to proceed.  This intervention will be ordered.  Discussed with patient that all procedures are part of a multimodal plan of care and include either formal PT or a home exercise program.  Patient has no evidence of coagulopathy or current infection.  --- Discussed OTC NSAIDS as tolerated.  --- MRI - lumbar  --- Follow-up after procedure or sooner if needed.       YUNIEL REPORT  YUNIEL report has been reviewed and scanned into the patient's chart.    As the clinician, I personally reviewed the YUNIEL from 1-5-21 while the patient was in the office today.          EMR Dragon/Transcription disclaimer:   Much of this encounter note is an electronic transcription/translation of spoken language to printed text. The electronic translation of spoken language may permit erroneous, or at times, nonsensical words or phrases to be inadvertently transcribed; Although I have reviewed the note for such errors, some may still exist.

## 2021-01-26 ENCOUNTER — IMMUNIZATION (OUTPATIENT)
Dept: VACCINE CLINIC | Facility: HOSPITAL | Age: 71
End: 2021-01-26

## 2021-01-26 PROCEDURE — 91300 HC SARSCOV02 VAC 30MCG/0.3ML IM: CPT | Performed by: INTERNAL MEDICINE

## 2021-01-26 PROCEDURE — 0001A: CPT | Performed by: INTERNAL MEDICINE

## 2021-02-04 ENCOUNTER — HOSPITAL ENCOUNTER (OUTPATIENT)
Dept: MRI IMAGING | Facility: HOSPITAL | Age: 71
Discharge: HOME | End: 2021-02-04
Admitting: NURSE PRACTITIONER

## 2021-02-04 DIAGNOSIS — M48.062 SPINAL STENOSIS OF LUMBAR REGION WITH NEUROGENIC CLAUDICATION: ICD-10-CM

## 2021-02-04 PROCEDURE — 72148 MRI LUMBAR SPINE W/O DYE: CPT

## 2021-02-08 NOTE — PROGRESS NOTES
What about going back to the epidural injections ?  Alternative would be to think about a stim trial but the psy eval might be tough

## 2021-02-09 NOTE — PROGRESS NOTES
Called and informed pt of MRI results, pt verbalized understanding. Pt sts he plans on being there for the scheduled procedure on 2/23/21.

## 2021-02-09 NOTE — PROGRESS NOTES
Sanna said it's on for [facility] with new scheduling system. Confirm with him when you talk to him.... thanks. ANGIE

## 2021-02-09 NOTE — PROGRESS NOTES
MRI shows no changes from CT in 2018.  Compression deformity is unchanged.  No acute findings. There are still the changes at L5 and S1 that Dr Mackey had considered doing surgery. This is unchanged. Dr Corcoran reviewed as well. Recommend proceeding with epidural as planned on 2-23. Thanks. ANGIE

## 2021-02-16 ENCOUNTER — TRANSCRIBE ORDERS (OUTPATIENT)
Dept: SURGERY | Facility: SURGERY CENTER | Age: 71
End: 2021-02-16

## 2021-02-16 ENCOUNTER — IMMUNIZATION (OUTPATIENT)
Dept: VACCINE CLINIC | Facility: HOSPITAL | Age: 71
End: 2021-02-16

## 2021-02-16 DIAGNOSIS — M48.062 SPINAL STENOSIS OF LUMBAR REGION WITH NEUROGENIC CLAUDICATION: Primary | ICD-10-CM

## 2021-02-16 PROCEDURE — 0002A: CPT | Performed by: INTERNAL MEDICINE

## 2021-02-16 PROCEDURE — 91300 HC SARSCOV02 VAC 30MCG/0.3ML IM: CPT | Performed by: INTERNAL MEDICINE

## 2021-02-19 ENCOUNTER — TRANSCRIBE ORDERS (OUTPATIENT)
Dept: LAB | Facility: SURGERY CENTER | Age: 71
End: 2021-02-19

## 2021-02-19 VITALS — BODY MASS INDEX: 21.22 KG/M2 | HEIGHT: 68 IN | WEIGHT: 140 LBS

## 2021-02-19 DIAGNOSIS — Z01.818 OTHER SPECIFIED PRE-OPERATIVE EXAMINATION: Primary | ICD-10-CM

## 2021-02-20 ENCOUNTER — LAB REQUISITION (OUTPATIENT)
Dept: LAB | Facility: HOSPITAL | Age: 71
End: 2021-02-20

## 2021-02-20 ENCOUNTER — LAB (OUTPATIENT)
Dept: LAB | Facility: SURGERY CENTER | Age: 71
End: 2021-02-20

## 2021-02-20 DIAGNOSIS — Z00.00 ENCOUNTER FOR GENERAL ADULT MEDICAL EXAMINATION WITHOUT ABNORMAL FINDINGS: ICD-10-CM

## 2021-02-20 LAB — SARS-COV-2 ORF1AB RESP QL NAA+PROBE: NOT DETECTED

## 2021-02-20 PROCEDURE — U0005 INFEC AGEN DETEC AMPLI PROBE: HCPCS | Performed by: SURGERY

## 2021-02-20 PROCEDURE — U0004 COV-19 TEST NON-CDC HGH THRU: HCPCS | Performed by: SURGERY

## 2021-02-22 PROCEDURE — S0260 H&P FOR SURGERY: HCPCS | Performed by: ANESTHESIOLOGY

## 2021-02-23 ENCOUNTER — HOSPITAL ENCOUNTER (OUTPATIENT)
Dept: GENERAL RADIOLOGY | Facility: SURGERY CENTER | Age: 71
Setting detail: HOSPITAL OUTPATIENT SURGERY
End: 2021-02-23

## 2021-02-23 ENCOUNTER — HOSPITAL ENCOUNTER (OUTPATIENT)
Facility: SURGERY CENTER | Age: 71
Setting detail: HOSPITAL OUTPATIENT SURGERY
Discharge: HOME | End: 2021-02-23
Attending: ANESTHESIOLOGY | Admitting: ANESTHESIOLOGY

## 2021-02-23 DIAGNOSIS — M48.062 SPINAL STENOSIS OF LUMBAR REGION WITH NEUROGENIC CLAUDICATION: ICD-10-CM

## 2021-02-23 PROCEDURE — G0463 HOSPITAL OUTPT CLINIC VISIT: HCPCS | Performed by: ANESTHESIOLOGY

## 2021-02-23 RX ORDER — SODIUM CHLORIDE 0.9 % (FLUSH) 0.9 %
10 SYRINGE (ML) INJECTION AS NEEDED
Status: DISCONTINUED | OUTPATIENT
Start: 2021-02-23 | End: 2021-02-23 | Stop reason: HOSPADM

## 2021-02-24 ENCOUNTER — TRANSCRIBE ORDERS (OUTPATIENT)
Dept: SURGERY | Facility: SURGERY CENTER | Age: 71
End: 2021-02-24

## 2021-02-24 DIAGNOSIS — M48.062 SPINAL STENOSIS OF LUMBAR REGION WITH NEUROGENIC CLAUDICATION: Primary | ICD-10-CM

## 2021-02-24 PROBLEM — M54.16 LUMBAR RADICULOPATHY: Status: ACTIVE | Noted: 2021-02-24

## 2021-03-01 ENCOUNTER — TRANSCRIBE ORDERS (OUTPATIENT)
Dept: LAB | Facility: SURGERY CENTER | Age: 71
End: 2021-03-01

## 2021-03-01 ENCOUNTER — LAB (OUTPATIENT)
Dept: LAB | Facility: SURGERY CENTER | Age: 71
End: 2021-03-01

## 2021-03-01 DIAGNOSIS — Z01.818 OTHER SPECIFIED PRE-OPERATIVE EXAMINATION: ICD-10-CM

## 2021-03-01 DIAGNOSIS — Z01.818 OTHER SPECIFIED PRE-OPERATIVE EXAMINATION: Primary | ICD-10-CM

## 2021-03-01 LAB — SARS-COV-2 ORF1AB RESP QL NAA+PROBE: NOT DETECTED

## 2021-03-01 PROCEDURE — C9803 HOPD COVID-19 SPEC COLLECT: HCPCS

## 2021-03-01 PROCEDURE — U0004 COV-19 TEST NON-CDC HGH THRU: HCPCS | Performed by: SURGERY

## 2021-03-01 PROCEDURE — U0005 INFEC AGEN DETEC AMPLI PROBE: HCPCS | Performed by: SURGERY

## 2021-03-02 DIAGNOSIS — I10 ESSENTIAL HYPERTENSION: ICD-10-CM

## 2021-03-02 PROCEDURE — S0260 H&P FOR SURGERY: HCPCS | Performed by: ANESTHESIOLOGY

## 2021-03-02 RX ORDER — LISINOPRIL 40 MG/1
TABLET ORAL
Qty: 90 TABLET | Refills: 0 | Status: SHIPPED | OUTPATIENT
Start: 2021-03-02 | End: 2021-04-23
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
## 2021-03-03 ENCOUNTER — HOSPITAL ENCOUNTER (OUTPATIENT)
Dept: GENERAL RADIOLOGY | Facility: SURGERY CENTER | Age: 71
Setting detail: HOSPITAL OUTPATIENT SURGERY
End: 2021-03-03

## 2021-03-03 ENCOUNTER — HOSPITAL ENCOUNTER (OUTPATIENT)
Facility: SURGERY CENTER | Age: 71
Setting detail: HOSPITAL OUTPATIENT SURGERY
Discharge: HOME | End: 2021-03-03
Attending: ANESTHESIOLOGY | Admitting: ANESTHESIOLOGY

## 2021-03-03 VITALS
TEMPERATURE: 98 F | WEIGHT: 145 LBS | OXYGEN SATURATION: 96 % | DIASTOLIC BLOOD PRESSURE: 70 MMHG | HEIGHT: 68 IN | BODY MASS INDEX: 21.98 KG/M2 | RESPIRATION RATE: 18 BRPM | SYSTOLIC BLOOD PRESSURE: 105 MMHG | HEART RATE: 67 BPM

## 2021-03-03 DIAGNOSIS — M48.062 SPINAL STENOSIS OF LUMBAR REGION WITH NEUROGENIC CLAUDICATION: ICD-10-CM

## 2021-03-03 PROCEDURE — 64483 NJX AA&/STRD TFRM EPI L/S 1: CPT | Performed by: ANESTHESIOLOGY

## 2021-03-03 PROCEDURE — 64484 NJX AA&/STRD TFRM EPI L/S EA: CPT | Performed by: ANESTHESIOLOGY

## 2021-03-03 PROCEDURE — 3E0R3BZ INTRODUCTION OF ANESTHETIC AGENT INTO SPINAL CANAL, PERCUTANEOUS APPROACH: ICD-10-PCS | Performed by: ANESTHESIOLOGY

## 2021-03-03 PROCEDURE — 3E0R33Z INTRODUCTION OF ANTI-INFLAMMATORY INTO SPINAL CANAL, PERCUTANEOUS APPROACH: ICD-10-PCS | Performed by: ANESTHESIOLOGY

## 2021-03-03 PROCEDURE — 25010000002 METHYLPREDNISOLONE PER 40 MG: Performed by: ANESTHESIOLOGY

## 2021-03-03 PROCEDURE — 0 IOHEXOL 300 MG/ML SOLUTION 10 ML VIAL: Performed by: ANESTHESIOLOGY

## 2021-03-03 PROCEDURE — 25010000002 MIDAZOLAM PER 1 MG: Performed by: ANESTHESIOLOGY

## 2021-03-03 PROCEDURE — 25010000002 FENTANYL CITRATE (PF) 100 MCG/2ML SOLUTION: Performed by: ANESTHESIOLOGY

## 2021-03-03 RX ORDER — MIDAZOLAM HYDROCHLORIDE 1 MG/ML
INJECTION INTRAMUSCULAR; INTRAVENOUS AS NEEDED
Status: DISCONTINUED | OUTPATIENT
Start: 2021-03-03 | End: 2021-03-03 | Stop reason: HOSPADM

## 2021-03-03 RX ORDER — SODIUM CHLORIDE 0.9 % (FLUSH) 0.9 %
10 SYRINGE (ML) INJECTION EVERY 12 HOURS SCHEDULED
Status: DISCONTINUED | OUTPATIENT
Start: 2021-03-03 | End: 2021-03-03 | Stop reason: HOSPADM

## 2021-03-03 RX ORDER — FENTANYL CITRATE 50 UG/ML
INJECTION, SOLUTION INTRAMUSCULAR; INTRAVENOUS AS NEEDED
Status: DISCONTINUED | OUTPATIENT
Start: 2021-03-03 | End: 2021-03-03 | Stop reason: HOSPADM

## 2021-03-03 RX ORDER — SODIUM CHLORIDE 0.9 % (FLUSH) 0.9 %
10 SYRINGE (ML) INJECTION AS NEEDED
Status: DISCONTINUED | OUTPATIENT
Start: 2021-03-03 | End: 2021-03-03 | Stop reason: HOSPADM

## 2021-03-03 RX ORDER — SODIUM CHLORIDE, SODIUM LACTATE, POTASSIUM CHLORIDE, CALCIUM CHLORIDE 600; 310; 30; 20 MG/100ML; MG/100ML; MG/100ML; MG/100ML
9 INJECTION, SOLUTION INTRAVENOUS CONTINUOUS PRN
Status: DISCONTINUED | OUTPATIENT
Start: 2021-03-03 | End: 2021-03-03 | Stop reason: HOSPADM

## 2021-03-03 NOTE — OP NOTE
Lumbar Transforaminal Epidural Steroid Injection (two levels)  [facility]      PREOPERATIVE DIAGNOSIS:  Lumbar Spinal Stenosis WITH Neurogenic Claudication and left Lumbar Radiculopathy    POSTOPERATIVE DIAGNOSIS:  Same as preop diagnosis    PROCEDURE:    1. CPT 52945 --  Diagnostic Transforaminal Epidural Steroid Injection at the L2 level, on the left   2. CPT 67234 --  Diagnostic Transforaminal Epidural Steroid Injection at the L5 level, on the left     PRE-PROCEDURE DISCUSSION WITH PATIENT:    Risks and complications were discussed with the patient prior to starting the procedure and informed consent was obtained.  We discussed various topics including but not limited to bleeding, infection, injury, nerve injury, paralysis, coma, death, postprocedural painful flare-up, postprocedural site soreness, and a lack of pain relief.  We discussed the diagnostic aspect of transforaminal epidural / selective nerve root blockade.    SURGEON:  Steven Corcoran MD    REASON FOR PROCEDURE:    Degenerative changes are noted in the area., Stenotic area is noted, and is likely contributing to this chronic &/or recurrent pain.  and Radiating pattern of pain is likely consistent with degenerative changes in the area.    SEDATION:  Versed 4mg & Fentanyl 50 mcg IV  ANESTHETIC:  Marcaine 0.25%  STEROID:  Methylprednisolone (DEPO MEDROL) 80mg/ml    DESCRIPTON OF PROCEDURE:  After obtaining informed consent, an I.V. was started in the preoperative area. The patient taken to the operating room and was placed in the prone position with a pillow under the abdomen.  All pressure points were well padded.  EKG, blood pressure, and pulse oximeter were monitored.  The lumbar area was prepped with Chloraprep and draped in a sterile fashion. Under fluoroscopic guidance in an oblique dimension on the above mentioned side, the transverse process of the first aforementioned vertebra at the junction of the body at 6 o'clock  position was identified. Skin and subcutaneous tissue was anesthetized with 1% lidocaine. A 22-gauge spinal needle was introduced under fluoroscopic guidance at the above junction into the foramen without parasthesias and into the epidural space. After confirming the position of the needle with PA, lateral, and oblique fluoroscopic views, aspiration was checked and was clear of blood or CSF.  Next, 1 mL of Omnipaque was injected. After seeing adequate spread on the corresponding nerve root, a total volume 2mL of injectate containing local anesthetic as above and half of the above mentioned corticosteroid was injected into the epidural space.  The needle was removed intact.      Next, in similar fashion, the second level mentioned above was addressed and a similar amount of injectate was delivered after similar imaging was achieved.      Vital signs were stable throughout.          ESTIMATED BLOOD LOSS:  <5 mL  SPECIMENS:  none    COMPLICATIONS:   No complications were noted., There was no indication of vascular uptake on live injection of contrast dye., There was no indication of intrathecal uptake on live injection of contrast dye., There was not any evidence of dural puncture.   and The patient did not have any signs of postprocedure numbness nor weakness.    TOLERANCE & DISCHARGE CONDITION:    The patient tolerated the procedure well.  The patient was transported to the recovery area without difficulties.  The patient was discharged to home under the care of family in stable and satisfactory condition.    PLAN OF CARE:  1. The patient was given our standard instruction sheet.  2. The patient will Return to clinic 3-4 wks.  3. The patient will resume all medications as per the medication reconciliation sheet.

## 2021-03-03 NOTE — DISCHARGE INSTRUCTIONS
[facility] - Post-procedure Instructions          --  While there are no absolute restrictions, it is recommended that you do not perform strenuous activity today. In the morning, you may resume your level of activity as before your block.    --  If you have a band-aid at your injection site, please remove it later today. Observe the area for any redness, swelling, pus-like drainage, or a temperature over 101°. If any of these symptoms occur, please call your doctor at [phone]. If after office hours, leave a message and the on-call provider will return your call.    --  Ice may be applied to your injection site. It is recommended you avoid direct heat (heating pad; hot tub) for 1-2 days.    --  Call [facility] at [phone] if you experience persistent headache, persistent bleeding from the injection site, or severe pain not relieved by heat or oral medication.    --  Do not make important decisions today.    --  Due to the effects of the block and/or the I.V. Sedation, DO NOT drive or operate hazardous machinery for 12 hours.    --  Do not drink alcohol for 12 hours.    -- You may return to work tomorrow, or as directed by your referring doctor.    --  Occasionally you may notice a slight increase in your pain after the procedure. This should start to improve within the next 24-48 hours. Radiofrequency ablation procedure pain may last 3-4 weeks.    --  It may take as long as 3-4 days before you notice a gradual improvement in your pain and/or other symptoms.    -- You may continue to take your prescribed pain medication as needed.    --  Some normal possible side effects of steroid use could include fluid retention, increased blood sugar, dull headache, increased sweating, increased appetite, mood swings and flushing.    --  Diabetics are recommended to watch their blood glucose level closely for 24-48 hours after the injection.    --  Must stay in PACU for 20 min upon arrival and  prove no leg weakness before being discharged.    --  IN THE EVENT OF A LIFE THREATENING EMERGENCY, (CHEST PAIN, BREATHING DIFFICULTIES, PARALYSIS…) YOU SHOULD GO TO YOUR NEAREST EMERGENCY ROOM.    --  You should be contacted by our office within 2-3 days to schedule follow up or next appointment date.  If not contacted within 7 days, please call the office at [phone]

## 2021-04-12 ENCOUNTER — OFFICE VISIT (OUTPATIENT)
Dept: PAIN MEDICINE | Facility: CLINIC | Age: 71
End: 2021-04-12

## 2021-04-12 ENCOUNTER — PREP FOR SURGERY (OUTPATIENT)
Dept: SURGERY | Facility: SURGERY CENTER | Age: 71
End: 2021-04-12

## 2021-04-12 VITALS
DIASTOLIC BLOOD PRESSURE: 70 MMHG | SYSTOLIC BLOOD PRESSURE: 133 MMHG | OXYGEN SATURATION: 98 % | BODY MASS INDEX: 21.52 KG/M2 | RESPIRATION RATE: 20 BRPM | WEIGHT: 142 LBS | TEMPERATURE: 96.4 F | HEART RATE: 105 BPM | HEIGHT: 68 IN

## 2021-04-12 DIAGNOSIS — M79.18 PIRIFORMIS MUSCLE PAIN: Primary | ICD-10-CM

## 2021-04-12 DIAGNOSIS — M54.16 LUMBAR RADICULOPATHY: ICD-10-CM

## 2021-04-12 DIAGNOSIS — M79.18 PIRIFORMIS MUSCLE PAIN: ICD-10-CM

## 2021-04-12 DIAGNOSIS — M48.062 SPINAL STENOSIS OF LUMBAR REGION WITH NEUROGENIC CLAUDICATION: ICD-10-CM

## 2021-04-12 DIAGNOSIS — G89.29 OTHER CHRONIC PAIN: Primary | ICD-10-CM

## 2021-04-12 PROCEDURE — 99214 OFFICE O/P EST MOD 30 MIN: CPT | Performed by: NURSE PRACTITIONER

## 2021-04-12 RX ORDER — SODIUM CHLORIDE 0.9 % (FLUSH) 0.9 %
10 SYRINGE (ML) INJECTION EVERY 12 HOURS SCHEDULED
Status: CANCELLED | OUTPATIENT
Start: 2021-04-12

## 2021-04-12 RX ORDER — SODIUM CHLORIDE 0.9 % (FLUSH) 0.9 %
10 SYRINGE (ML) INJECTION AS NEEDED
Status: CANCELLED | OUTPATIENT
Start: 2021-04-12

## 2021-04-12 NOTE — PROGRESS NOTES
CHIEF COMPLAINT  F/u back pain. Pt had transforaminal epidural steroid injection left lumbar 2 and left lumbar 5 and sts receiving 80% relief x 2 weeks then pain returned to baseline.     Subjective   Jose Villafuerte is a 71 y.o. male  who presents for follow-up.  He has a history of chronic back and leg pain. Reports pain has been variable since last evaluation.    He requests to repeat piriformis injection.    Patient presents for follow-up of PROCEDURE. Patient had a TF TOM left L2 and L5 performed by Dr. HERNANDEZ on 3-3-21 for management of LOW BACK AND LEG PAIN. Patient reports 80% relief from the procedure for 2 weeks. Pain has not quite returned to baseline. He believes he is still getting 30-40% relief at this time, depending on activity and position.    Complains of pain in his low back and left leg. Today his pain is 6/10VAS. Describes his pain as intermittent throbbing and stabbing. Pain increases with activity, household chores; pain decreases with rest and procedures.  His primary complaint is his left buttock/piriformis pain. Wants to repeat procedure. ADL's by self. Denies any bowel or bladder changes.      Saw Dr Mackey. Plan was for surgery but that was cancelled(May 2019). Referred to pain management by Dr Hinton. Has not seen patient recently.   Dr Oviedo referred pt to Dr. Hinton for orthopedic surgery. Pt went to [facility] on 5/3/19 to have Left L5-S1 laminectomy, foraminotomies and medial facetectomy with metrx with Dr. Mackey, but surgery was cancelled due to pt hx of alcohol use. Dr. Mackey declined operating. Pt sts he currently drinks 12 cans of beer daily throughout the day. Pt sts he also smokes marijuana once a month to control pain.     Has completed both COVID vaccines.    Patient remained masked during entire encounter. No cough present. I donned a mask and eye protection throughout entire visit. Prior to donning mask and eye protection, hand hygiene was performed, as well as  when it was doffed.  I was closer than 6 feet, but not for an extended period of time. No obvious exposure to any bodily fluids.    Back Pain  This is a chronic problem. The current episode started more than 1 year ago. The problem occurs constantly. The problem has been waxing and waning since onset. The pain is present in the lumbar spine. The quality of the pain is described as aching. The pain is at a severity of 6/10. The pain is moderate. The pain is worse during the day. The symptoms are aggravated by bending, sitting and position. Pertinent negatives include no bladder incontinence, bowel incontinence, chest pain, dysuria, fever, headaches, numbness or weakness. Risk factors include sedentary lifestyle and lack of exercise. He has tried bed rest, heat, home exercises, ice, NSAIDs and walking for the symptoms. The treatment provided mild relief.      Procedure List  -- 3-3-21--- TF TOM left L2 and L5  -- 12-17-20-- left piriformis  -- 9-15-20-- TF TOM left L5 and left piriformis  -- 8-13-20--  TF TOM left L5  -- 6-10-20-- left piriformis  -- 3-3-30-- TF TOM left L5  -- 2-4-20-- TF TOM right L2 and L5  -- 12-8-19-- LEFT SI-- 20% relief  -- 11-13-19-- left piriformis  -- 10-10-19-- left piriformis    Narrative & Impression   MRI LUMBAR SPINE WITHOUT CONTRAST 02/04/2021      CLINICAL HISTORY: Spinal stenosis lumbosacral region, patient had low  back pain for greater than 6 weeks, pain radiates now into left leg for  several years, lumbar radiculopathy.     TECHNIQUE: Sagittal T1, proton density and fat-suppressed T2-weighted  images were obtained of the lumbar spine, in addition axial T2-weighted  images were obtained from T12 to S2 thin cut axial T1-weighted images  were obtained angled through the interspaces from T12 to S1.     COMPARISON: This is correlated to the postmyelogram CT of the lumbar  spine 12/24/2018. At T12-L1, the posterior disc margin and facets are  normal with no canal or foraminal  narrowing.     FINDINGS: The distal thoracic cord and the conus is normal in signal  intensity. The conus terminates at the L1-2 interspace level which is  normal.     There is a mild chronic compression fracture involving the anterior  superior body and endplate of L1 with about 25% loss of anterior  vertebral body height. The marrow signal intensity of the L1 vertebra is  normal indicating that this is a chronic compression fracture. There is  chronic compression deformity was present post myelogram CT lumbar spine  on 12/24/2018 and is unchanged.     At L1-L2, the posterior disc margin and facets are normal with no canal  or foraminal narrowing.     At L2-L3, disc space and facets are normal without canal or foraminal  narrowing.     At L3-L4, there is minimal disc desiccation, 1 mm retrolisthesis of L3  on L4, minimal posterior disc bulge. The facets are normal. There is no  canal or foraminal narrowing.     At L4-L5, there is minimal facet overgrowth. There is mild posterior  disc bulge. There is no canal or foraminal narrowing.     At L5-S1, there is mild bilateral facet overgrowth. There is disc space  narrowing and degenerative endplate changes, 3 mm retrolisthesis of L5  with respect to S1 with slight uncovering of the posterior inferior  aspect of L5-S1 disc space, disc material bulging along the posterior  superior endplate of S1 comes close to the ventral aspect of the  traversing right S1 nerve root, does not have mass effect on it or  compress it. There is no significant canal or lateral recess narrowing.  There is spurring into the inferior aspect of the foramina, mild left  and mild-to-moderate right bony foraminal narrowing.     IMPRESSION:  1. No change when compared to postmyelogram CT of the lumbar spine from  [facility] 12/24/2018.  2. Mild chronic compression deformity of the anterior superior body and  endplate of L1 with 25% loss of anterior vertebral body height.  3. Very  minimal lumbar spondylosis as described.  No disc herniation,  canal or foraminal narrowing seen from T12 to L5. At L5-S1, there is 3  mm retrolisthesis of L5 on S1, uncovered bulging disc material extending  along the posterior superior endplate of S1 that abuts the ventral  aspect of the traversing right S1 nerve root, does not displace or  compress it. There is spurring into the foramina, mild left and  mild-to-moderate right bony foraminal narrowing at L5-S1. The remainder  of the lumbar spine MRI is unremarkable.     This report was finalized on 2/5/2021 8:49 AM by Dr. Lucas Pennington M.D.         PEG Assessment   What number best describes your pain on average in the past week?6  What number best describes how, during the past week, pain has interfered with your enjoyment of life?6  What number best describes how, during the past week, pain has interfered with your general activity?  6      The following portions of the patient's history were reviewed and updated as appropriate: allergies, current medications, past family history, past medical history, past social history, past surgical history and problem list.    Review of Systems   Constitutional: Negative for activity change, fatigue and fever.   HENT: Negative for congestion.    Eyes: Negative for visual disturbance.   Respiratory: Negative for cough and shortness of breath.    Cardiovascular: Negative for chest pain.   Gastrointestinal: Negative for bowel incontinence, constipation and diarrhea.   Genitourinary: Negative for bladder incontinence, difficulty urinating and dysuria.   Musculoskeletal: Positive for back pain.   Allergic/Immunologic: Negative for immunocompromised state.   Neurological: Negative for dizziness, weakness, light-headedness, numbness and headaches.   Psychiatric/Behavioral: Negative for agitation, sleep disturbance and suicidal ideas. The patient is not nervous/anxious.      I have reviewed and confirmed the accuracy of the ROS as  "documented by the MA/LPN/RN Ryann Manning, JENNIFER      Vitals:    04/12/21 1039   BP: 133/70   Pulse: 105   Resp: 20   Temp: 96.4 °F (35.8 °C)   SpO2: 98%   Weight: 64.4 kg (142 lb)   Height: 172.7 cm (68\")   PainSc:   6   PainLoc: Back     Objective   Physical Exam  Vitals and nursing note reviewed.   Constitutional:       Appearance: Normal appearance. He is well-developed.   HENT:      Head: Normocephalic and atraumatic.   Eyes:      General: Lids are normal.   Musculoskeletal:      Lumbar back: Tenderness present. Decreased range of motion.        Legs:    Skin:     General: Skin is warm and dry.   Neurological:      Mental Status: He is alert and oriented to person, place, and time.      Gait: Gait abnormal.   Psychiatric:         Speech: Speech normal.         Behavior: Behavior normal. Behavior is cooperative.         Thought Content: Thought content normal.         Judgment: Judgment normal.         Assessment/Plan   Diagnoses and all orders for this visit:    1. Other chronic pain (Primary)    2. Spinal stenosis of lumbar region with neurogenic claudication    3. Lumbar radiculopathy    4. Piriformis muscle pain      --- reviewed MRI with patient. See above.  --- repeat left piriformis injection. Reviewed the procedure at length with the patient.  Included in the review was expectations, complications, risk and benefits.The procedure was described in detail and the risks, benefits and alternatives were discussed with the patient (including but not limited to: bleeding, infection, nerve damage, worsening of pain, inability to perform injection, paralysis, seizures, and death) who agreed to proceed.  Discussed the potential for sedation if warranted/wanted.  The procedure will plan to be performed at [facility] with fluoroscopic guidance(unless ultrasound is indicated) and could potentially have steroids and contrast dye used. Questions were answered and in a way the patient could " understand.  Patient verbalized understanding and wishes to proceed.  This intervention will be ordered.  Discussed with patient that all procedures are part of a multimodal plan of care and include either formal PT or a home exercise program.  Patient has no evidence of coagulopathy or current infection.  --- Follow-up after procedure or sooner if needed.       YUNIEL REPORT  YUNIEL report has been reviewed and scanned into the patient's chart.    As the clinician, I personally reviewed the YUNIEL from 4-12-21 while the patient was in the office today.            EMR Dragon/Transcription disclaimer:   Much of this encounter note is an electronic transcription/translation of spoken language to printed text. The electronic translation of spoken language may permit erroneous, or at times, nonsensical words or phrases to be inadvertently transcribed; Although I have reviewed the note for such errors, some may still exist.

## 2021-04-20 ENCOUNTER — TRANSCRIBE ORDERS (OUTPATIENT)
Dept: LAB | Facility: SURGERY CENTER | Age: 71
End: 2021-04-20

## 2021-04-20 ENCOUNTER — TRANSCRIBE ORDERS (OUTPATIENT)
Dept: SURGERY | Facility: SURGERY CENTER | Age: 71
End: 2021-04-20

## 2021-04-20 DIAGNOSIS — M79.18 PIRIFORMIS MUSCLE PAIN: Primary | ICD-10-CM

## 2021-04-20 DIAGNOSIS — Z01.818 OTHER SPECIFIED PRE-OPERATIVE EXAMINATION: Primary | ICD-10-CM

## 2021-04-20 NOTE — PROGRESS NOTES
"Chief Complaint  Establish Care    Subjective          Jose Villafuerte presents to [facility]  History of Present Illness     Prior PCP Ivelisse    Patient here today to establish and discuss his blood pressure.  He takes amlodipine and lisinopril.  Denies chest pain, shortness of breath, lower extremity edema, headache.    Endorses wheezing sometimes from the COPd but is breathing well.  Refuses to discuss quitting cigarettes.    Objective   Vital Signs:   /80 (BP Location: Left arm, Patient Position: Sitting, Cuff Size: Adult)   Pulse 95   Temp 99.3 °F (37.4 °C) (Temporal)   Resp 16   Ht 172.7 cm (68\")   Wt 65 kg (143 lb 6.4 oz)   SpO2 96%   BMI 21.80 kg/m²     Physical Exam  Vitals and nursing note reviewed.   Constitutional:       General: He is not in acute distress.     Appearance: Normal appearance.   Cardiovascular:      Rate and Rhythm: Normal rate and regular rhythm.      Heart sounds: Normal heart sounds.   Pulmonary:      Effort: Pulmonary effort is normal.      Breath sounds: Normal air entry. Wheezing present. No decreased breath sounds.   Neurological:      Mental Status: He is alert.        Result Review :                 Assessment and Plan    Diagnoses and all orders for this visit:    1. Establishing care with new doctor, encounter for (Primary)  -     CBC (No Diff)  -     Comprehensive Metabolic Panel  -     Lipid Panel  -     Ambulatory Referral For Screening Colonoscopy  -     Hepatitis C antibody  -     CT Chest Low Dose Wo; Future  -     [place] AAA Screen Limited; Future    2. Essential hypertension  -     CBC (No Diff)  -     Comprehensive Metabolic Panel  -     amLODIPine (NORVASC) 10 MG tablet; Take 1 tablet by mouth Daily.  Dispense: 90 tablet; Refill: 3  -     lisinopril (PRINIVIL,ZESTRIL) 40 MG tablet; Take 1 tablet by mouth Daily.  Dispense: 90 tablet; Refill: 3    3. Dyslipidemia  -     Lipid Panel    4. Screen for colon cancer  -     " Ambulatory Referral For Screening Colonoscopy    5. Encounter for hepatitis C screening test for low risk patient  -     Hepatitis C antibody    6. Encounter for screening for lung cancer  -     CT Chest Low Dose Wo; Future    7. Cigarette nicotine dependence with nicotine-induced disorder  -     CT Chest Low Dose Wo; Future  -     [place] AAA Screen Limited; Future    8. Encounter for screening for abdominal aortic aneurysm (AAA) in patient 50 years of age or older with history of smoking  -     [place] AAA Screen Limited; Future    9. Panlobular emphysema (CMS/HCC)      Refuses to discuss smoking cessation but willing to do the screening for lung cancer, AAA, and colon cancer.  Blood work today and refilled his medications.      He says his breathing is fine and does not wish for further treatment of the COPD.        Follow Up   Return in about 6 months (around 10/23/2021) for Recheck - HTN.  Patient was given instructions and counseling regarding his condition or for health maintenance advice. Please see specific information pulled into the AVS if appropriate.

## 2021-04-23 ENCOUNTER — OFFICE VISIT (OUTPATIENT)
Dept: INTERNAL MEDICINE | Facility: CLINIC | Age: 71
End: 2021-04-23

## 2021-04-23 VITALS
WEIGHT: 143.4 LBS | HEIGHT: 68 IN | OXYGEN SATURATION: 96 % | BODY MASS INDEX: 21.73 KG/M2 | RESPIRATION RATE: 16 BRPM | DIASTOLIC BLOOD PRESSURE: 80 MMHG | HEART RATE: 95 BPM | SYSTOLIC BLOOD PRESSURE: 132 MMHG | TEMPERATURE: 99.3 F

## 2021-04-23 DIAGNOSIS — Z13.6 ENCOUNTER FOR SCREENING FOR ABDOMINAL AORTIC ANEURYSM (AAA) IN PATIENT 50 YEARS OF AGE OR OLDER WITH HISTORY OF SMOKING: ICD-10-CM

## 2021-04-23 DIAGNOSIS — F17.219 CIGARETTE NICOTINE DEPENDENCE WITH NICOTINE-INDUCED DISORDER: ICD-10-CM

## 2021-04-23 DIAGNOSIS — J43.1 PANLOBULAR EMPHYSEMA (HCC): ICD-10-CM

## 2021-04-23 DIAGNOSIS — Z12.2 ENCOUNTER FOR SCREENING FOR LUNG CANCER: ICD-10-CM

## 2021-04-23 DIAGNOSIS — Z12.11 SCREEN FOR COLON CANCER: ICD-10-CM

## 2021-04-23 DIAGNOSIS — Z11.59 ENCOUNTER FOR HEPATITIS C SCREENING TEST FOR LOW RISK PATIENT: ICD-10-CM

## 2021-04-23 DIAGNOSIS — Z76.89 ESTABLISHING CARE WITH NEW DOCTOR, ENCOUNTER FOR: Primary | ICD-10-CM

## 2021-04-23 DIAGNOSIS — Z87.891 ENCOUNTER FOR SCREENING FOR ABDOMINAL AORTIC ANEURYSM (AAA) IN PATIENT 50 YEARS OF AGE OR OLDER WITH HISTORY OF SMOKING: ICD-10-CM

## 2021-04-23 DIAGNOSIS — I10 ESSENTIAL HYPERTENSION: ICD-10-CM

## 2021-04-23 DIAGNOSIS — E78.5 DYSLIPIDEMIA: ICD-10-CM

## 2021-04-23 PROCEDURE — 99214 OFFICE O/P EST MOD 30 MIN: CPT | Performed by: FAMILY MEDICINE

## 2021-04-23 RX ORDER — LISINOPRIL 40 MG/1
40 TABLET ORAL DAILY
Qty: 90 TABLET | Refills: 3 | Status: SHIPPED | OUTPATIENT
Start: 2021-04-23 | End: 2022-06-02

## 2021-04-23 RX ORDER — AMLODIPINE BESYLATE 10 MG/1
10 TABLET ORAL DAILY
Qty: 90 TABLET | Refills: 3 | Status: SHIPPED | OUTPATIENT
Start: 2021-04-23 | End: 2021-06-01 | Stop reason: SDUPTHER

## 2021-04-23 NOTE — PATIENT INSTRUCTIONS
Health Maintenance After Age 65  After age 65, you are at a higher risk for certain long-term diseases and infections as well as injuries from falls. Falls are a major cause of broken bones and head injuries in people who are older than age 65. Getting regular preventive care can help to keep you healthy and well. Preventive care includes getting regular testing and making lifestyle changes as recommended by your health care provider. Talk with your health care provider about:  · Which screenings and tests you should have. A screening is a test that checks for a disease when you have no symptoms.  · A diet and exercise plan that is right for you.  What should I know about screenings and tests to prevent falls?  Screening and testing are the best ways to find a health problem early. Early diagnosis and treatment give you the best chance of managing medical conditions that are common after age 65. Certain conditions and lifestyle choices may make you more likely to have a fall. Your health care provider may recommend:  · Regular vision checks. Poor vision and conditions such as cataracts can make you more likely to have a fall. If you wear glasses, make sure to get your prescription updated if your vision changes.  · Medicine review. Work with your health care provider to regularly review all of the medicines you are taking, including over-the-counter medicines. Ask your health care provider about any side effects that may make you more likely to have a fall. Tell your health care provider if any medicines that you take make you feel dizzy or sleepy.  · Osteoporosis screening. Osteoporosis is a condition that causes the bones to get weaker. This can make the bones weak and cause them to break more easily.  · Blood pressure screening. Blood pressure changes and medicines to control blood pressure can make you feel dizzy.  · Strength and balance checks. Your health care provider may recommend certain tests to check your  strength and balance while standing, walking, or changing positions.  · Foot health exam. Foot pain and numbness, as well as not wearing proper footwear, can make you more likely to have a fall.  · Depression screening. You may be more likely to have a fall if you have a fear of falling, feel emotionally low, or feel unable to do activities that you used to do.  · Alcohol use screening. Using too much alcohol can affect your balance and may make you more likely to have a fall.  What actions can I take to lower my risk of falls?  General instructions  · Talk with your health care provider about your risks for falling. Tell your health care provider if:  ? You fall. Be sure to tell your health care provider about all falls, even ones that seem minor.  ? You feel dizzy, sleepy, or off-balance.  · Take over-the-counter and prescription medicines only as told by your health care provider. These include any supplements.  · Eat a healthy diet and maintain a healthy weight. A healthy diet includes low-fat dairy products, low-fat (lean) meats, and fiber from whole grains, beans, and lots of fruits and vegetables.  Home safety  · Remove any tripping hazards, such as rugs, cords, and clutter.  · Install safety equipment such as grab bars in bathrooms and safety rails on stairs.  · Keep rooms and walkways well-lit.  Activity    · Follow a regular exercise program to stay fit. This will help you maintain your balance. Ask your health care provider what types of exercise are appropriate for you.  · If you need a cane or walker, use it as recommended by your health care provider.  · Wear supportive shoes that have nonskid soles.  Lifestyle  · Do not drink alcohol if your health care provider tells you not to drink.  · If you drink alcohol, limit how much you have:  ? 0-1 drink a day for women.  ? 0-2 drinks a day for men.  · Be aware of how much alcohol is in your drink. In the [place] one drink equals one typical bottle of beer (12  oz), one-half glass of wine (5 oz), or one shot of hard liquor (1½ oz).  · Do not use any products that contain nicotine or tobacco, such as cigarettes and e-cigarettes. If you need help quitting, ask your health care provider.  Summary  · Having a healthy lifestyle and getting preventive care can help to protect your health and wellness after age 65.  · Screening and testing are the best way to find a health problem early and help you avoid having a fall. Early diagnosis and treatment give you the best chance for managing medical conditions that are more common for people who are older than age 65.  · Falls are a major cause of broken bones and head injuries in people who are older than age 65. Take precautions to prevent a fall at home.  · Work with your health care provider to learn what changes you can make to improve your health and wellness and to prevent falls.  This information is not intended to replace advice given to you by your health care provider. Make sure you discuss any questions you have with your health care provider.  Document Revised: 04/09/2020 Document Reviewed: 10/31/2018  [employer]vier Patient Education © 2021 [employer]

## 2021-04-24 LAB
ALBUMIN SERPL-MCNC: 4.7 G/DL (ref 3.5–5.2)
ALBUMIN/GLOB SERPL: 1.7 G/DL
ALP SERPL-CCNC: 120 U/L (ref 39–117)
ALT SERPL-CCNC: 22 U/L (ref 1–41)
AST SERPL-CCNC: 36 U/L (ref 1–40)
BILIRUB SERPL-MCNC: 0.6 MG/DL (ref 0–1.2)
BUN SERPL-MCNC: 4 MG/DL (ref 8–23)
BUN/CREAT SERPL: 6.8 (ref 7–25)
CALCIUM SERPL-MCNC: 10 MG/DL (ref 8.6–10.5)
CHLORIDE SERPL-SCNC: 95 MMOL/L (ref 98–107)
CHOLEST SERPL-MCNC: 201 MG/DL (ref 0–200)
CO2 SERPL-SCNC: 24.5 MMOL/L (ref 22–29)
CREAT SERPL-MCNC: 0.59 MG/DL (ref 0.76–1.27)
ERYTHROCYTE [DISTWIDTH] IN BLOOD BY AUTOMATED COUNT: 12 % (ref 12.3–15.4)
GLOBULIN SER CALC-MCNC: 2.7 GM/DL
GLUCOSE SERPL-MCNC: 89 MG/DL (ref 65–99)
HCT VFR BLD AUTO: 48.5 % (ref 37.5–51)
HCV AB S/CO SERPL IA: <0.1 S/CO RATIO (ref 0–0.9)
HDLC SERPL-MCNC: 78 MG/DL (ref 40–60)
HGB BLD-MCNC: 16.8 G/DL (ref 13–17.7)
LDLC SERPL CALC-MCNC: 109 MG/DL (ref 0–100)
MCH RBC QN AUTO: 35.6 PG (ref 26.6–33)
MCHC RBC AUTO-ENTMCNC: 34.6 G/DL (ref 31.5–35.7)
MCV RBC AUTO: 102.8 FL (ref 79–97)
PLATELET # BLD AUTO: 452 10*3/MM3 (ref 140–450)
POTASSIUM SERPL-SCNC: 4.5 MMOL/L (ref 3.5–5.2)
PROT SERPL-MCNC: 7.4 G/DL (ref 6–8.5)
RBC # BLD AUTO: 4.72 10*6/MM3 (ref 4.14–5.8)
SODIUM SERPL-SCNC: 135 MMOL/L (ref 136–145)
TRIGL SERPL-MCNC: 81 MG/DL (ref 0–150)
VLDLC SERPL CALC-MCNC: 14 MG/DL (ref 5–40)
WBC # BLD AUTO: 12.35 10*3/MM3 (ref 3.4–10.8)

## 2021-04-26 ENCOUNTER — LAB (OUTPATIENT)
Dept: LAB | Facility: SURGERY CENTER | Age: 71
End: 2021-04-26

## 2021-04-26 DIAGNOSIS — Z01.818 OTHER SPECIFIED PRE-OPERATIVE EXAMINATION: ICD-10-CM

## 2021-04-26 LAB — SARS-COV-2 ORF1AB RESP QL NAA+PROBE: NOT DETECTED

## 2021-04-26 PROCEDURE — U0005 INFEC AGEN DETEC AMPLI PROBE: HCPCS | Performed by: SURGERY

## 2021-04-26 PROCEDURE — C9803 HOPD COVID-19 SPEC COLLECT: HCPCS

## 2021-04-26 PROCEDURE — U0004 COV-19 TEST NON-CDC HGH THRU: HCPCS | Performed by: SURGERY

## 2021-04-28 ENCOUNTER — HOSPITAL ENCOUNTER (OUTPATIENT)
Dept: GENERAL RADIOLOGY | Facility: SURGERY CENTER | Age: 71
Setting detail: HOSPITAL OUTPATIENT SURGERY
End: 2021-04-28

## 2021-04-28 ENCOUNTER — HOSPITAL ENCOUNTER (OUTPATIENT)
Facility: SURGERY CENTER | Age: 71
Setting detail: HOSPITAL OUTPATIENT SURGERY
Discharge: HOME | End: 2021-04-28
Attending: ANESTHESIOLOGY | Admitting: ANESTHESIOLOGY

## 2021-04-28 VITALS
HEART RATE: 78 BPM | BODY MASS INDEX: 22.44 KG/M2 | WEIGHT: 143 LBS | HEIGHT: 67 IN | TEMPERATURE: 98.7 F | SYSTOLIC BLOOD PRESSURE: 119 MMHG | DIASTOLIC BLOOD PRESSURE: 67 MMHG | OXYGEN SATURATION: 95 % | RESPIRATION RATE: 20 BRPM

## 2021-04-28 DIAGNOSIS — M79.18 PIRIFORMIS MUSCLE PAIN: ICD-10-CM

## 2021-04-28 PROCEDURE — 25010000002 FENTANYL CITRATE (PF) 100 MCG/2ML SOLUTION: Performed by: ANESTHESIOLOGY

## 2021-04-28 PROCEDURE — 20552 NJX 1/MLT TRIGGER POINT 1/2: CPT | Performed by: ANESTHESIOLOGY

## 2021-04-28 PROCEDURE — 25010000002 METHYLPREDNISOLONE PER 80 MG: Performed by: ANESTHESIOLOGY

## 2021-04-28 PROCEDURE — 25010000002 MIDAZOLAM PER 1 MG: Performed by: ANESTHESIOLOGY

## 2021-04-28 PROCEDURE — 77002 NEEDLE LOCALIZATION BY XRAY: CPT | Performed by: ANESTHESIOLOGY

## 2021-04-28 PROCEDURE — 0 IOHEXOL 300 MG/ML SOLUTION 10 ML VIAL: Performed by: ANESTHESIOLOGY

## 2021-04-28 PROCEDURE — 3E023BZ INTRODUCTION OF ANESTHETIC AGENT INTO MUSCLE, PERCUTANEOUS APPROACH: ICD-10-PCS | Performed by: ANESTHESIOLOGY

## 2021-04-28 RX ORDER — FENTANYL CITRATE 50 UG/ML
INJECTION, SOLUTION INTRAMUSCULAR; INTRAVENOUS AS NEEDED
Status: DISCONTINUED | OUTPATIENT
Start: 2021-04-28 | End: 2021-04-28 | Stop reason: HOSPADM

## 2021-04-28 RX ORDER — SODIUM CHLORIDE 0.9 % (FLUSH) 0.9 %
10 SYRINGE (ML) INJECTION AS NEEDED
Status: DISCONTINUED | OUTPATIENT
Start: 2021-04-28 | End: 2021-04-28 | Stop reason: HOSPADM

## 2021-04-28 RX ORDER — SODIUM CHLORIDE 0.9 % (FLUSH) 0.9 %
10 SYRINGE (ML) INJECTION EVERY 12 HOURS SCHEDULED
Status: DISCONTINUED | OUTPATIENT
Start: 2021-04-28 | End: 2021-04-28 | Stop reason: HOSPADM

## 2021-04-28 RX ORDER — MIDAZOLAM HYDROCHLORIDE 1 MG/ML
INJECTION INTRAMUSCULAR; INTRAVENOUS AS NEEDED
Status: DISCONTINUED | OUTPATIENT
Start: 2021-04-28 | End: 2021-04-28 | Stop reason: HOSPADM

## 2021-05-05 ENCOUNTER — TELEPHONE (OUTPATIENT)
Dept: GASTROENTEROLOGY | Facility: CLINIC | Age: 71
End: 2021-05-05

## 2021-05-06 ENCOUNTER — TRANSCRIBE ORDERS (OUTPATIENT)
Dept: LAB | Facility: SURGERY CENTER | Age: 71
End: 2021-05-06

## 2021-05-06 ENCOUNTER — PREP FOR SURGERY (OUTPATIENT)
Dept: SURGERY | Facility: SURGERY CENTER | Age: 71
End: 2021-05-06

## 2021-05-06 DIAGNOSIS — Z12.11 ENCOUNTER FOR SCREENING FOR MALIGNANT NEOPLASM OF COLON: Primary | ICD-10-CM

## 2021-05-06 DIAGNOSIS — Z01.818 OTHER SPECIFIED PRE-OPERATIVE EXAMINATION: Primary | ICD-10-CM

## 2021-05-06 RX ORDER — SODIUM CHLORIDE 0.9 % (FLUSH) 0.9 %
3 SYRINGE (ML) INJECTION EVERY 12 HOURS SCHEDULED
Status: CANCELLED | OUTPATIENT
Start: 2021-05-06

## 2021-05-06 RX ORDER — SODIUM CHLORIDE, SODIUM LACTATE, POTASSIUM CHLORIDE, CALCIUM CHLORIDE 600; 310; 30; 20 MG/100ML; MG/100ML; MG/100ML; MG/100ML
30 INJECTION, SOLUTION INTRAVENOUS CONTINUOUS PRN
Status: CANCELLED | OUTPATIENT
Start: 2021-05-06

## 2021-05-06 RX ORDER — SODIUM CHLORIDE 0.9 % (FLUSH) 0.9 %
10 SYRINGE (ML) INJECTION AS NEEDED
Status: CANCELLED | OUTPATIENT
Start: 2021-05-06

## 2021-05-17 ENCOUNTER — PREP FOR SURGERY (OUTPATIENT)
Dept: SURGERY | Facility: SURGERY CENTER | Age: 71
End: 2021-05-17

## 2021-05-17 ENCOUNTER — OFFICE VISIT (OUTPATIENT)
Dept: PAIN MEDICINE | Facility: CLINIC | Age: 71
End: 2021-05-17

## 2021-05-17 VITALS
OXYGEN SATURATION: 96 % | WEIGHT: 138 LBS | HEIGHT: 67 IN | SYSTOLIC BLOOD PRESSURE: 146 MMHG | BODY MASS INDEX: 21.66 KG/M2 | TEMPERATURE: 97.3 F | RESPIRATION RATE: 18 BRPM | HEART RATE: 93 BPM | DIASTOLIC BLOOD PRESSURE: 75 MMHG

## 2021-05-17 DIAGNOSIS — M54.16 LUMBAR RADICULOPATHY: ICD-10-CM

## 2021-05-17 DIAGNOSIS — G89.29 OTHER CHRONIC PAIN: Primary | ICD-10-CM

## 2021-05-17 DIAGNOSIS — M48.062 SPINAL STENOSIS OF LUMBAR REGION WITH NEUROGENIC CLAUDICATION: ICD-10-CM

## 2021-05-17 DIAGNOSIS — M48.062 SPINAL STENOSIS OF LUMBAR REGION WITH NEUROGENIC CLAUDICATION: Primary | ICD-10-CM

## 2021-05-17 PROCEDURE — 99214 OFFICE O/P EST MOD 30 MIN: CPT | Performed by: NURSE PRACTITIONER

## 2021-05-17 RX ORDER — SODIUM CHLORIDE 0.9 % (FLUSH) 0.9 %
10 SYRINGE (ML) INJECTION AS NEEDED
Status: CANCELLED | OUTPATIENT
Start: 2021-05-17

## 2021-05-17 RX ORDER — SODIUM CHLORIDE 0.9 % (FLUSH) 0.9 %
10 SYRINGE (ML) INJECTION EVERY 12 HOURS SCHEDULED
Status: CANCELLED | OUTPATIENT
Start: 2021-05-17

## 2021-05-17 NOTE — PROGRESS NOTES
CHIEF COMPLAINT  Back pain is unchanged since last visit    Subjective   Jose Villafuerte is a 71 y.o. male  who presents to the office for follow-up of procedure.  He completed a Piriformis Injection  (unilateral)(LEFT)   on  4/28/21 performed by Dr. Corcoran for management of back pain. Patient reports 75% relief from the procedure for 2 weeks.    Complains of pain in his low back, left leg and left buttock. Today his pain is 6/10VAS. Describes the pain as continuous throbbing with intermittent sharp and pinching.  REports his pain worsens as day progresses. Pain increases with sitting, standing; pain decreases with procedures, change position/walking. Recently purchased a treadmill and notes this helps his pain, is using approximately once per day for a few minutes. Notes improvement with this. ADL's by self. Denies any bowel or bladder incontinence.       Saw Dr Mackey. Plan was for surgery but that was cancelled(May 2019). Referred to pain management by Dr Hinton. Has not seen patient recently.   Dr Oviedo referred pt to Dr. Hinton for orthopedic surgery. Pt went to [facility] on 5/3/19 to have Left L5-S1 laminectomy, foraminotomies and medial facetectomy with metrx with Dr. Mackey, but surgery was cancelled due to pt hx of alcohol use. Dr. Mackey declined operating. Pt sts he currently drinks 12 cans of beer daily throughout the day. Pt sts he also smokes marijuana once a month to control pain.      Has completed both COVID vaccines.    Patient remained masked during entire encounter. No cough present. I donned a mask and eye protection throughout entire visit. Prior to donning mask and eye protection, hand hygiene was performed, as well as when it was doffed.  I was closer than 6 feet, but not for an extended period of time. No obvious exposure to any bodily fluids.      Back Pain  This is a chronic problem. The current episode started more than 1 year ago. The problem occurs constantly. The problem  has been waxing and waning since onset. The pain is present in the lumbar spine. The quality of the pain is described as aching. The pain is at a severity of 6/10. The pain is moderate. The pain is worse during the day. The symptoms are aggravated by bending, sitting and position. Associated symptoms include numbness. Pertinent negatives include no bladder incontinence, bowel incontinence, chest pain, dysuria, fever, headaches or weakness. Risk factors include sedentary lifestyle and lack of exercise. He has tried bed rest, heat, home exercises, ice, NSAIDs and walking for the symptoms. The treatment provided mild relief.      Procedure List  -- 4-28-21-- left piriformis  -- 3-3-21--- TF TOM left L2 and L5-- Patient reports 80% relief from the procedure for 2 weeks. Pain has not quite returned to baseline. He believes he is still getting 30-40% relief at this time, depending on activity and position As of 4-12-21  -- 12-17-20-- left piriformis  -- 9-15-20-- TF TOM left L5 and left piriformis  -- 8-13-20--  TF TOM left L5  -- 6-10-20-- left piriformis  -- 3-3-30-- TF TOM left L5  -- 2-4-20-- TF TOM right L2 and L5  -- 12-8-19-- LEFT SI-- 20% relief  -- 11-13-19-- left piriformis  -- 10-10-19-- left piriformis    PEG Assessment   What number best describes your pain on average in the past week?4  What number best describes how, during the past week, pain has interfered with your enjoyment of life?5  What number best describes how, during the past week, pain has interfered with your general activity?  6      The following portions of the patient's history were reviewed and updated as appropriate: allergies, current medications, past family history, past medical history, past social history, past surgical history and problem list.    Review of Systems   Constitutional: Negative for chills and fever.   Respiratory: Negative for shortness of breath.    Cardiovascular: Negative for chest pain.   Gastrointestinal: Negative  "for bowel incontinence, constipation, diarrhea, nausea and vomiting.   Genitourinary: Negative for bladder incontinence, difficulty urinating, dysuria and enuresis.   Musculoskeletal: Positive for back pain.   Neurological: Positive for numbness. Negative for dizziness, weakness, light-headedness and headaches.   Psychiatric/Behavioral: Negative for confusion, hallucinations, self-injury, sleep disturbance and suicidal ideas. The patient is not nervous/anxious.    All other systems reviewed and are negative.    I have reviewed and confirmed the accuracy of the ROS as documented by the MA/LPN/RN Ryann Manning, JENNIFER       Vitals:    05/17/21 1046   BP: 146/75   Pulse: 93   Resp: 18   Temp: 97.3 °F (36.3 °C)   SpO2: 96%   Weight: 62.6 kg (138 lb)   Height: 170.2 cm (67\")   PainSc:   6   PainLoc: Back     Objective   Physical Exam  Vitals and nursing note reviewed.   Constitutional:       Appearance: Normal appearance. He is well-developed.   HENT:      Head: Normocephalic and atraumatic.   Musculoskeletal:      Lumbar back: Tenderness present. Decreased range of motion.      Comments: + SLR on left, negative on right   Skin:     General: Skin is warm and dry.   Neurological:      Mental Status: He is alert.      Gait: Gait abnormal.   Psychiatric:         Speech: Speech normal.         Behavior: Behavior normal. Behavior is cooperative.         Thought Content: Thought content normal.         Judgment: Judgment normal.         Assessment/Plan   Diagnoses and all orders for this visit:    1. Other chronic pain (Primary)    2. Spinal stenosis of lumbar region with neurogenic claudication    3. Lumbar radiculopathy      --- Declines PT.  --- Repeat TF TOM Left L2 and L5. No blood thinners. Reviewed the procedure at length with the patient.  Included in the review was expectations, complications, risk and benefits.The procedure was described in detail and the risks, benefits and alternatives were discussed with the " patient (including but not limited to: bleeding, infection, nerve damage, worsening of pain, inability to perform injection, paralysis, seizures, and death) who agreed to proceed.  Discussed the potential for sedation if warranted/wanted.  The procedure will plan to be performed at [facility] with fluoroscopic guidance(unless ultrasound is indicated) and could potentially have steroids and contrast dye used. Questions were answered and in a way the patient could understand.  Patient verbalized understanding and wishes to proceed.  This intervention will be ordered.  Discussed with patient that all procedures are part of a multimodal plan of care and include either formal PT or a home exercise program.  Patient has no evidence of coagulopathy or current infection.  --- Hold on Piriformis until July.  --- Follow-up after procedure or sooner if needed.         YUNIEL REPORT      YUNIEL report has been reviewed and scanned into the patient's chart.    As the clinician, I personally reviewed the YUNIEL from 5-17-21 while the patient was in the office today.          EMR Dragon/Transcription disclaimer:   Much of this encounter note is an electronic transcription/translation of spoken language to printed text. The electronic translation of spoken language may permit erroneous, or at times, nonsensical words or phrases to be inadvertently transcribed; Although I have reviewed the note for such errors, some may still exist.

## 2021-05-20 ENCOUNTER — TRANSCRIBE ORDERS (OUTPATIENT)
Dept: SURGERY | Facility: SURGERY CENTER | Age: 71
End: 2021-05-20

## 2021-05-20 DIAGNOSIS — M54.16 LUMBAR RADICULOPATHY: Primary | ICD-10-CM

## 2021-05-26 ENCOUNTER — HOSPITAL ENCOUNTER (OUTPATIENT)
Dept: ULTRASOUND IMAGING | Facility: HOSPITAL | Age: 71
Discharge: HOME | End: 2021-05-26

## 2021-05-26 ENCOUNTER — HOSPITAL ENCOUNTER (OUTPATIENT)
Dept: CT IMAGING | Facility: HOSPITAL | Age: 71
Discharge: HOME | End: 2021-05-26

## 2021-05-26 DIAGNOSIS — F17.219 CIGARETTE NICOTINE DEPENDENCE WITH NICOTINE-INDUCED DISORDER: ICD-10-CM

## 2021-05-26 DIAGNOSIS — Z87.891 ENCOUNTER FOR SCREENING FOR ABDOMINAL AORTIC ANEURYSM (AAA) IN PATIENT 50 YEARS OF AGE OR OLDER WITH HISTORY OF SMOKING: ICD-10-CM

## 2021-05-26 DIAGNOSIS — Z13.6 ENCOUNTER FOR SCREENING FOR ABDOMINAL AORTIC ANEURYSM (AAA) IN PATIENT 50 YEARS OF AGE OR OLDER WITH HISTORY OF SMOKING: ICD-10-CM

## 2021-05-26 DIAGNOSIS — Z12.2 ENCOUNTER FOR SCREENING FOR LUNG CANCER: ICD-10-CM

## 2021-05-26 DIAGNOSIS — Z76.89 ESTABLISHING CARE WITH NEW DOCTOR, ENCOUNTER FOR: ICD-10-CM

## 2021-05-26 PROCEDURE — 71271 CT THORAX LUNG CANCER SCR C-: CPT

## 2021-05-26 PROCEDURE — 76706 US ABDL AORTA SCREEN AAA: CPT

## 2021-06-01 DIAGNOSIS — I10 ESSENTIAL HYPERTENSION: ICD-10-CM

## 2021-06-01 RX ORDER — AMLODIPINE BESYLATE 10 MG/1
10 TABLET ORAL DAILY
Qty: 90 TABLET | Refills: 3 | Status: SHIPPED | OUTPATIENT
Start: 2021-06-01 | End: 2022-06-02

## 2021-06-01 NOTE — TELEPHONE ENCOUNTER
Caller: Christo Jose AKASH    Relationship: Self    Best call back number: [phone]    Medication needed:   Requested Prescriptions     Pending Prescriptions Disp Refills   • amLODIPine (NORVASC) 10 MG tablet 90 tablet 3     Sig: Take 1 tablet by mouth Daily.       When do you need the refill by: ASAP    What additional details did the patient provide when requesting the medication: PATIENT STATES THE PHARMACY DID NOT RECEIVE THE REFILL BEFORE.  PLEASE RESEND.     Does the patient have less than a 3 day supply:  [x] Yes  [] No    What is the patient's preferred pharmacy: [facility] [address] [place] [address] AT [facility] - [phone]  - [phone] FX

## 2021-06-15 ENCOUNTER — HOSPITAL ENCOUNTER (OUTPATIENT)
Dept: GENERAL RADIOLOGY | Facility: SURGERY CENTER | Age: 71
Setting detail: HOSPITAL OUTPATIENT SURGERY
End: 2021-06-15

## 2021-06-15 ENCOUNTER — HOSPITAL ENCOUNTER (OUTPATIENT)
Facility: SURGERY CENTER | Age: 71
Setting detail: HOSPITAL OUTPATIENT SURGERY
Discharge: HOME | End: 2021-06-15
Attending: ANESTHESIOLOGY | Admitting: ANESTHESIOLOGY

## 2021-06-15 VITALS
HEIGHT: 68 IN | SYSTOLIC BLOOD PRESSURE: 120 MMHG | HEART RATE: 75 BPM | OXYGEN SATURATION: 96 % | BODY MASS INDEX: 21.22 KG/M2 | RESPIRATION RATE: 16 BRPM | TEMPERATURE: 97.8 F | DIASTOLIC BLOOD PRESSURE: 60 MMHG | WEIGHT: 140 LBS

## 2021-06-15 DIAGNOSIS — M48.062 SPINAL STENOSIS OF LUMBAR REGION WITH NEUROGENIC CLAUDICATION: ICD-10-CM

## 2021-06-15 DIAGNOSIS — M54.16 LUMBAR RADICULOPATHY: ICD-10-CM

## 2021-06-15 PROCEDURE — 3E0R33Z INTRODUCTION OF ANTI-INFLAMMATORY INTO SPINAL CANAL, PERCUTANEOUS APPROACH: ICD-10-PCS | Performed by: ANESTHESIOLOGY

## 2021-06-15 PROCEDURE — 0 IOHEXOL 300 MG/ML SOLUTION 10 ML VIAL: Performed by: ANESTHESIOLOGY

## 2021-06-15 PROCEDURE — 64484 NJX AA&/STRD TFRM EPI L/S EA: CPT | Performed by: ANESTHESIOLOGY

## 2021-06-15 PROCEDURE — 25010000002 MIDAZOLAM PER 1 MG: Performed by: ANESTHESIOLOGY

## 2021-06-15 PROCEDURE — 25010000002 FENTANYL CITRATE (PF) 50 MCG/ML SOLUTION: Performed by: ANESTHESIOLOGY

## 2021-06-15 PROCEDURE — 64483 NJX AA&/STRD TFRM EPI L/S 1: CPT | Performed by: ANESTHESIOLOGY

## 2021-06-15 PROCEDURE — 25010000002 METHYLPREDNISOLONE PER 80 MG: Performed by: ANESTHESIOLOGY

## 2021-06-15 RX ORDER — FENTANYL CITRATE 50 UG/ML
INJECTION, SOLUTION INTRAMUSCULAR; INTRAVENOUS AS NEEDED
Status: DISCONTINUED | OUTPATIENT
Start: 2021-06-15 | End: 2021-06-15 | Stop reason: HOSPADM

## 2021-06-15 RX ORDER — SODIUM CHLORIDE 0.9 % (FLUSH) 0.9 %
10 SYRINGE (ML) INJECTION EVERY 12 HOURS SCHEDULED
Status: DISCONTINUED | OUTPATIENT
Start: 2021-06-15 | End: 2021-06-15 | Stop reason: HOSPADM

## 2021-06-15 RX ORDER — MIDAZOLAM HYDROCHLORIDE 1 MG/ML
INJECTION INTRAMUSCULAR; INTRAVENOUS AS NEEDED
Status: DISCONTINUED | OUTPATIENT
Start: 2021-06-15 | End: 2021-06-15 | Stop reason: HOSPADM

## 2021-06-15 RX ORDER — SODIUM CHLORIDE 0.9 % (FLUSH) 0.9 %
10 SYRINGE (ML) INJECTION AS NEEDED
Status: DISCONTINUED | OUTPATIENT
Start: 2021-06-15 | End: 2021-06-15 | Stop reason: HOSPADM

## 2021-06-15 NOTE — DISCHARGE INSTRUCTIONS
[facility] - Post-procedure Instructions          --  While there are no absolute restrictions, it is recommended that you do not perform strenuous activity today. In the morning, you may resume your level of activity as before your block.    --  If you have a band-aid at your injection site, please remove it later today. Observe the area for any redness, swelling, pus-like drainage, or a temperature over 101°. If any of these symptoms occur, please call your doctor at [phone]. If after office hours, leave a message and the on-call provider will return your call.    --  Ice may be applied to your injection site. It is recommended you avoid direct heat (heating pad; hot tub) for 1-2 days.    --  Call [facility] at [phone] if you experience persistent headache, persistent bleeding from the injection site, or severe pain not relieved by heat or oral medication.    --  Do not make important decisions today.    --  Due to the effects of the block and/or the I.V. Sedation, DO NOT drive or operate hazardous machinery for 12 hours.    --  Do not drink alcohol for 12 hours.    -- You may return to work tomorrow, or as directed by your referring doctor.    --  Occasionally you may notice a slight increase in your pain after the procedure. This should start to improve within the next 24-48 hours. Radiofrequency ablation procedure pain may last 3-4 weeks.    --  It may take as long as 3-4 days before you notice a gradual improvement in your pain and/or other symptoms.    -- You may continue to take your prescribed pain medication as needed.    --  Some normal possible side effects of steroid use could include fluid retention, increased blood sugar, dull headache, increased sweating, increased appetite, mood swings and flushing.    --  Diabetics are recommended to watch their blood glucose level closely for 24-48 hours after the injection.    --  Must stay in PACU for 20 min upon arrival and  prove no leg weakness before being discharged.    --  IN THE EVENT OF A LIFE THREATENING EMERGENCY, (CHEST PAIN, BREATHING DIFFICULTIES, PARALYSIS…) YOU SHOULD GO TO YOUR NEAREST EMERGENCY ROOM.    --  You should be contacted by our office within 2-3 days to schedule follow up or next appointment date.  If not contacted within 7 days, please call the office at [phone]

## 2021-07-15 ENCOUNTER — PREP FOR SURGERY (OUTPATIENT)
Dept: SURGERY | Facility: SURGERY CENTER | Age: 71
End: 2021-07-15

## 2021-07-15 ENCOUNTER — OFFICE VISIT (OUTPATIENT)
Dept: PAIN MEDICINE | Facility: CLINIC | Age: 71
End: 2021-07-15

## 2021-07-15 ENCOUNTER — TRANSCRIBE ORDERS (OUTPATIENT)
Dept: SURGERY | Facility: SURGERY CENTER | Age: 71
End: 2021-07-15

## 2021-07-15 VITALS
TEMPERATURE: 97.8 F | SYSTOLIC BLOOD PRESSURE: 127 MMHG | OXYGEN SATURATION: 98 % | RESPIRATION RATE: 20 BRPM | WEIGHT: 136 LBS | BODY MASS INDEX: 20.61 KG/M2 | HEART RATE: 93 BPM | HEIGHT: 68 IN | DIASTOLIC BLOOD PRESSURE: 76 MMHG

## 2021-07-15 DIAGNOSIS — M79.18 PIRIFORMIS MUSCLE PAIN: ICD-10-CM

## 2021-07-15 DIAGNOSIS — M48.062 SPINAL STENOSIS OF LUMBAR REGION WITH NEUROGENIC CLAUDICATION: ICD-10-CM

## 2021-07-15 DIAGNOSIS — M54.16 LUMBAR RADICULOPATHY: ICD-10-CM

## 2021-07-15 DIAGNOSIS — Z41.9 SURGERY, ELECTIVE: Primary | ICD-10-CM

## 2021-07-15 DIAGNOSIS — M48.062 SPINAL STENOSIS OF LUMBAR REGION WITH NEUROGENIC CLAUDICATION: Primary | ICD-10-CM

## 2021-07-15 DIAGNOSIS — G89.29 OTHER CHRONIC PAIN: Primary | ICD-10-CM

## 2021-07-15 PROCEDURE — 99214 OFFICE O/P EST MOD 30 MIN: CPT | Performed by: NURSE PRACTITIONER

## 2021-07-15 RX ORDER — SODIUM CHLORIDE 0.9 % (FLUSH) 0.9 %
10 SYRINGE (ML) INJECTION AS NEEDED
Status: CANCELLED | OUTPATIENT
Start: 2021-07-15

## 2021-07-15 RX ORDER — SODIUM CHLORIDE 0.9 % (FLUSH) 0.9 %
10 SYRINGE (ML) INJECTION EVERY 12 HOURS SCHEDULED
Status: CANCELLED | OUTPATIENT
Start: 2021-07-15

## 2021-07-15 NOTE — PROGRESS NOTES
CHIEF COMPLAINT  F/u back pain. Pt had transforaminal epidural steroid injection left lumbar 2 and lumbar 5 and sts receiving 75% relief x a month and pain is starting to return.     Subjective   Jose Villafuerte is a 71 y.o. male  who presents for follow-up.  He has a history of chronic back and leg pain. Reports his pain is variable since last evaluation.    Patient presents for follow-up of PROCEDURE. Patient had a TF TOM Left L2 and L5 performed by Dr. HERNANDEZ on 6-15-21 for management of LOW BACK AND LEG PAIN. Patient reports 75% ONGOING relief from the procedure. He is notice this is starting to wear off slightly at this time.    Complains of pain in his low back and left leg. Today his pain is 6/10VAS. Describes his pain as near continuous throbbing and burning. Pain increases with activity, yardwork, cold weather; pain decreases with procedures and warmer weather. He thinks his pain is better controlled in the warmer weather. ADL's by self. Denies any bowel or bladder changes.     Saw Dr Mackey. Plan was for surgery but that was cancelled(May 2019). Referred to pain management by Dr Hinton. Has not seen patient recently.   Dr Oviedo referred pt to Dr. Hinton for orthopedic surgery. Pt went to [facility] on 5/3/19 to have Left L5-S1 laminectomy, foraminotomies and medial facetectomy with metrx with Dr. Mackey, but surgery was cancelled due to pt hx of alcohol use. Dr. Mackey declined operating. Pt sts he currently drinks 12 cans of beer daily throughout the day. Pt sts he also smokes marijuana once a month to control pain.     Has had both COVID vaccines.    Patient remained masked during entire encounter. No cough present. I donned a mask and eye protection throughout entire visit. Prior to donning mask and eye protection, hand hygiene was performed, as well as when it was doffed.  I was closer than 6 feet, but not for an extended period of time. No obvious exposure to any bodily fluids.    Back  Pain  This is a chronic problem. The current episode started more than 1 year ago. The problem occurs constantly. The problem has been waxing and waning (varaible since last evaluation) since onset. The pain is present in the lumbar spine. The quality of the pain is described as aching. The pain is at a severity of 6/10. The pain is moderate. The pain is worse during the day. The symptoms are aggravated by bending, sitting and position. Pertinent negatives include no bladder incontinence, bowel incontinence, chest pain, dysuria, fever, headaches, numbness or weakness. Risk factors include sedentary lifestyle and lack of exercise. He has tried bed rest, heat, home exercises, ice, NSAIDs and walking for the symptoms. The treatment provided mild relief.      Procedure List  -- 6-15-21-- TF TOM Left L2 and L5  -- 4-28-21-- left piriformis  -- 3-3-21--- TF TOM left L2 and L5-- Patient reports 80% relief from the procedure for 2 weeks. Pain has not quite returned to baseline. He believes he is still getting 30-40% relief at this time, depending on activity and position As of 4-12-21  -- 12-17-20-- left piriformis  -- 9-15-20-- TF TOM left L5 and left piriformis  -- 8-13-20--  TF TOM left L5  -- 6-10-20-- left piriformis  -- 3-3-30-- TF TOM left L5  -- 2-4-20-- TF TOM right L2 and L5  -- 12-8-19-- LEFT SI-- 20% relief  -- 11-13-19-- left piriformis  -- 10-10-19-- left piriformis    PEG Assessment   What number best describes your pain on average in the past week?6  What number best describes how, during the past week, pain has interfered with your enjoyment of life?6  What number best describes how, during the past week, pain has interfered with your general activity?  6    The following portions of the patient's history were reviewed and updated as appropriate: allergies, current medications, past family history, past medical history, past social history, past surgical history and problem list.    Review of Systems  "  Constitutional: Negative for activity change, fatigue and fever.   HENT: Negative for congestion.    Eyes: Negative for visual disturbance.   Respiratory: Negative for cough and shortness of breath.    Cardiovascular: Negative for chest pain.   Gastrointestinal: Negative for bowel incontinence, constipation and diarrhea.   Endocrine: Negative for polyuria.   Genitourinary: Negative for bladder incontinence, difficulty urinating and dysuria.   Musculoskeletal: Positive for back pain.   Allergic/Immunologic: Negative for immunocompromised state.   Neurological: Negative for dizziness, weakness, light-headedness, numbness and headaches.   Psychiatric/Behavioral: Negative for agitation, sleep disturbance and suicidal ideas. The patient is not nervous/anxious.      I have reviewed and confirmed the accuracy of the ROS as documented by the MA/LPN/RN JENNIFER Perez      Vitals:    07/15/21 1040   BP: 127/76   Pulse: 93   Resp: 20   Temp: 97.8 °F (36.6 °C)   SpO2: 98%   Weight: 61.7 kg (136 lb)   Height: 172.7 cm (68\")   PainSc:   6   PainLoc: Back     Objective   Physical Exam  Vitals and nursing note reviewed.   Constitutional:       Appearance: Normal appearance. He is well-developed.   HENT:      Head: Normocephalic and atraumatic.   Musculoskeletal:      Lumbar back: Tenderness present. Decreased range of motion.      Comments: + SLR on left, negative on right   Skin:     General: Skin is warm and dry.   Neurological:      Mental Status: He is alert.      Gait: Gait abnormal.   Psychiatric:         Speech: Speech normal.         Behavior: Behavior normal. Behavior is cooperative.         Thought Content: Thought content normal.         Judgment: Judgment normal.             Assessment/Plan   Diagnoses and all orders for this visit:    1. Other chronic pain (Primary)  -     Ambulatory Referral to Psychology    2. Spinal stenosis of lumbar region with neurogenic claudication  -     Ambulatory Referral to " Psychology    3. Lumbar radiculopathy  -     Ambulatory Referral to Psychology    4. Piriformis muscle pain      --- repeat TF TOM left L5--- not be done until after 9-15-21. Reviewed the procedure at length with the patient.  Included in the review was expectations, complications, risk and benefits.The procedure was described in detail and the risks, benefits and alternatives were discussed with the patient (including but not limited to: bleeding, infection, nerve damage, worsening of pain, inability to perform injection, paralysis, seizures, and death) who agreed to proceed.  Discussed the potential for sedation if warranted/wanted.  The procedure will plan to be performed at [facility] with fluoroscopic guidance(unless ultrasound is indicated) and could potentially have steroids and contrast dye used. Questions were answered and in a way the patient could understand.  Patient verbalized understanding and wishes to proceed.  This intervention will be ordered.  Discussed with patient that all procedures are part of a multimodal plan of care and include either formal PT or a home exercise program.  Patient has no evidence of coagulopathy or current infection.  --- Referral to Dr zarate for SCS evaluation.  --- Given hand out for [employer] SCS  --- Education session with [employer].  --- Follow-up after procedure or sooner if needed.    Education about SCS therapy:   - This was an extended office visit in which we entered into discussion about advanced pain relieving techniques, and discussed implantable pain therapies. We discussed advanced neuromodulation in the form of Spinal Cord Stimulation. This is a reasonable therapy for patients who have exhausted basic nonnarcotic options, basic modalities and physical therapies, and do not have any other reasonable surgical options. This therapy as an alternative to long term high dose opioid therapy.   - Risks include but are not  limited to bleeding, infection, injury, paralysis, nerve injury, dural puncture, and risk for postprocedural pain. Implanted equipment risks include but are not limited to lead migration, lead fracture, risk of loss of pain relieving stimulation, risk of electrical shock, and risk of system failure.   - We discussed the theory and basic science behind SCS therapy including but not limited to energy delivery and relevant anatomy, in terms that are easy to understand and also with use of illustrative devices. Spinal Cord Stimulation therapies apply an electromagnetic field to a specific area on the spinal cord (Dorsal Column) to attempt to block transmission of painful signals from the peripheral nerves to the brain.   - We discussed that prior to trialing, I request that patients review relevant materials and perform some research, and also have a follow up education session with a device specialist from the . Also, insurance requires a presurgical psychological evaluation. When these have been completed, and all the patient's questions have been answered to their satisfaction, then we will plan to request authorization for trialing.   - We discussed the trialing process (aka Phase 1) that usually lasts a week, and the temporary nature of this trial. Trial success will determine whether or not we proceed to implant. We discussed reasonable expectations, and that I feel that consistent 50% pain relief if medically successful and is a reasonable expectation to justify moving forward to permanent implant.   - Additional risks of Phase 1 include but are not limited to bleeding on insertion, bleeding on lead removal, and procedural site soreness.  - We discussed the percutaneous surgical implant, including postsurgical restrictions, risks, and alternatives. For spinal cord stimulation implanted device (aka SCS Phase 2) there is usually a midline vertical incision for the spinally implanted leads, and also a  horizontal incision in the posterior lumbar flank for implantation of the battery & computer (aka IPG). The leads are tunneled from the midline incision to the medial aspect of the battery pocket incision.   - Postoperative restrictions include limiting the following activity as much as possible for 90 days: Lifting >10 lbs, bending at the waist, stretching/reaching overhead, and twisting       YUNIEL REPORT    As the clinician, I personally reviewed the YUNIEL from 7-15-21 while the patient was in the office today.            EMR Dragon/Transcription disclaimer:   Much of this encounter note is an electronic transcription/translation of spoken language to printed text. The electronic translation of spoken language may permit erroneous, or at times, nonsensical words or phrases to be inadvertently transcribed; Although I have reviewed the note for such errors, some may still exist.

## 2021-07-23 ENCOUNTER — LAB (OUTPATIENT)
Dept: LAB | Facility: HOSPITAL | Age: 71
End: 2021-07-23

## 2021-07-23 ENCOUNTER — TRANSCRIBE ORDERS (OUTPATIENT)
Dept: ADMINISTRATIVE | Facility: HOSPITAL | Age: 71
End: 2021-07-23

## 2021-07-23 DIAGNOSIS — E55.9 VITAMIN D DEFICIENCY, UNSPECIFIED: ICD-10-CM

## 2021-07-23 DIAGNOSIS — R43.2 AGEUSIA: Primary | ICD-10-CM

## 2021-07-23 DIAGNOSIS — R43.2 AGEUSIA: ICD-10-CM

## 2021-07-23 LAB
25(OH)D3 SERPL-MCNC: 55.7 NG/ML (ref 30–100)
FOLATE SERPL-MCNC: >20 NG/ML (ref 4.78–24.2)
VIT B12 BLD-MCNC: 698 PG/ML (ref 211–946)

## 2021-07-23 PROCEDURE — 82746 ASSAY OF FOLIC ACID SERUM: CPT

## 2021-07-23 PROCEDURE — 82607 VITAMIN B-12: CPT

## 2021-07-23 PROCEDURE — 84630 ASSAY OF ZINC: CPT

## 2021-07-23 PROCEDURE — 36415 COLL VENOUS BLD VENIPUNCTURE: CPT

## 2021-07-23 PROCEDURE — 82306 VITAMIN D 25 HYDROXY: CPT

## 2021-07-27 LAB — ZINC SERPL-MCNC: 91 UG/DL (ref 44–115)

## 2021-08-09 NOTE — SIGNIFICANT NOTE
Education provided the Patient on the following:    - Nothing to Eat or Drink after MN the night before the procedure    - Avoid red/purple fluids while completing their bowel prep as ordered by physician  -Contact Gastrointerologist office for any questions about specific details regarding colon prep    -You will need to have someone drive you home after your colonoscopy and remain with you for 24 hours after the procedure  - The date of your Surgery, you may have one visitor at bedside or within 10-15 minutes of [place]  -Please wear warm socks when you arrive for your colonoscopy  -Remove all jewelry and leave any valuables before arriving the day of your procedure (all will have to be removed before leaving preop)  -You will need to arrive at    1015       on        8/12   for your colonoscopy  8/12  -Feel free to contact us at: [phone] with any additional questions/concerns

## 2021-08-12 ENCOUNTER — HOSPITAL ENCOUNTER (OUTPATIENT)
Facility: SURGERY CENTER | Age: 71
Setting detail: HOSPITAL OUTPATIENT SURGERY
Discharge: HOME | End: 2021-08-12
Attending: INTERNAL MEDICINE | Admitting: INTERNAL MEDICINE

## 2021-08-12 ENCOUNTER — ANESTHESIA EVENT (OUTPATIENT)
Dept: SURGERY | Facility: SURGERY CENTER | Age: 71
End: 2021-08-12

## 2021-08-12 ENCOUNTER — ANESTHESIA (OUTPATIENT)
Dept: SURGERY | Facility: SURGERY CENTER | Age: 71
End: 2021-08-12

## 2021-08-12 VITALS
WEIGHT: 140 LBS | RESPIRATION RATE: 20 BRPM | HEIGHT: 68 IN | TEMPERATURE: 98 F | OXYGEN SATURATION: 98 % | BODY MASS INDEX: 21.22 KG/M2 | HEART RATE: 76 BPM | SYSTOLIC BLOOD PRESSURE: 122 MMHG | DIASTOLIC BLOOD PRESSURE: 88 MMHG

## 2021-08-12 DIAGNOSIS — Z12.11 ENCOUNTER FOR SCREENING FOR MALIGNANT NEOPLASM OF COLON: ICD-10-CM

## 2021-08-12 PROCEDURE — 45380 COLONOSCOPY AND BIOPSY: CPT | Performed by: INTERNAL MEDICINE

## 2021-08-12 PROCEDURE — 25010000002 PROPOFOL 10 MG/ML EMULSION: Performed by: NURSE ANESTHETIST, CERTIFIED REGISTERED

## 2021-08-12 PROCEDURE — 0DBK8ZX EXCISION OF ASCENDING COLON, VIA NATURAL OR ARTIFICIAL OPENING ENDOSCOPIC, DIAGNOSTIC: ICD-10-PCS | Performed by: INTERNAL MEDICINE

## 2021-08-12 PROCEDURE — 88305 TISSUE EXAM BY PATHOLOGIST: CPT | Performed by: INTERNAL MEDICINE

## 2021-08-12 RX ORDER — SODIUM CHLORIDE 0.9 % (FLUSH) 0.9 %
3 SYRINGE (ML) INJECTION EVERY 12 HOURS SCHEDULED
Status: DISCONTINUED | OUTPATIENT
Start: 2021-08-12 | End: 2021-08-12 | Stop reason: HOSPADM

## 2021-08-12 RX ORDER — SODIUM CHLORIDE 0.9 % (FLUSH) 0.9 %
10 SYRINGE (ML) INJECTION AS NEEDED
Status: DISCONTINUED | OUTPATIENT
Start: 2021-08-12 | End: 2021-08-12 | Stop reason: HOSPADM

## 2021-08-12 RX ORDER — SODIUM CHLORIDE, SODIUM LACTATE, POTASSIUM CHLORIDE, CALCIUM CHLORIDE 600; 310; 30; 20 MG/100ML; MG/100ML; MG/100ML; MG/100ML
30 INJECTION, SOLUTION INTRAVENOUS CONTINUOUS PRN
Status: DISCONTINUED | OUTPATIENT
Start: 2021-08-12 | End: 2021-08-12 | Stop reason: HOSPADM

## 2021-08-12 RX ORDER — PROPOFOL 10 MG/ML
VIAL (ML) INTRAVENOUS AS NEEDED
Status: DISCONTINUED | OUTPATIENT
Start: 2021-08-12 | End: 2021-08-12 | Stop reason: SURG

## 2021-08-12 RX ORDER — MAGNESIUM HYDROXIDE 1200 MG/15ML
LIQUID ORAL AS NEEDED
Status: DISCONTINUED | OUTPATIENT
Start: 2021-08-12 | End: 2021-08-12 | Stop reason: HOSPADM

## 2021-08-12 RX ADMIN — PROPOFOL 30 MG: 10 INJECTION, EMULSION INTRAVENOUS at 09:22

## 2021-08-12 RX ADMIN — PROPOFOL 140 MCG/KG/MIN: 10 INJECTION, EMULSION INTRAVENOUS at 09:16

## 2021-08-12 RX ADMIN — PROPOFOL 50 MG: 10 INJECTION, EMULSION INTRAVENOUS at 09:16

## 2021-08-12 RX ADMIN — SODIUM CHLORIDE, SODIUM LACTATE, POTASSIUM CHLORIDE, AND CALCIUM CHLORIDE 30 ML/HR: .6; .31; .03; .02 INJECTION, SOLUTION INTRAVENOUS at 08:01

## 2021-08-12 RX ADMIN — PROPOFOL 30 MG: 10 INJECTION, EMULSION INTRAVENOUS at 09:19

## 2021-08-12 NOTE — ANESTHESIA PREPROCEDURE EVALUATION
Anesthesia Evaluation     Patient summary reviewed and Nursing notes reviewed   no history of anesthetic complications:  NPO Solid Status: > 8 hours  NPO Liquid Status: > 2 hours           Airway   Mallampati: II  TM distance: >3 FB  Neck ROM: full  No difficulty expected  Dental      Pulmonary    (+) a smoker, COPD,   Cardiovascular   Exercise tolerance: good (4-7 METS)    ECG reviewed    (+) hypertension,     ROS comment: EKG reviewed    Neuro/Psych  GI/Hepatic/Renal/Endo      Musculoskeletal         ROS comment: Spinal stenosis  Abdominal    Substance History      OB/GYN          Other                        Anesthesia Plan    ASA 3     MAC     intravenous induction     Anesthetic plan, all risks, benefits, and alternatives have been provided, discussed and informed consent has been obtained with: patient.
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
## 2021-08-12 NOTE — ANESTHESIA POSTPROCEDURE EVALUATION
Patient: Jose Villafuerte    Procedure Summary     Date: 08/12/21 Room / Location: [facility]    Anesthesia Start: 0911 Anesthesia Stop: 0941    Procedure: COLONOSCOPY (N/A ) Diagnosis:       Encounter for screening for malignant neoplasm of colon      (Encounter for screening for malignant neoplasm of colon [Z12.11])    Surgeons: Billy Alexander MD Provider: Dann Booker MD    Anesthesia Type: MAC ASA Status: 3          Anesthesia Type: MAC    Vitals  Vitals Value Taken Time   BP 97/67 08/12/21 0938   Temp 36.7 °C (98 °F) 08/12/21 0938   Pulse 77 08/12/21 0938   Resp 20 08/12/21 0938   SpO2 98 % 08/12/21 0938           Post Anesthesia Care and Evaluation    Patient location during evaluation: bedside  Patient participation: complete - patient participated  Level of consciousness: awake and alert  Pain management: adequate  Airway patency: patent  Anesthetic complications: No anesthetic complications  PONV Status: controlled  Cardiovascular status: blood pressure returned to baseline and acceptable  Respiratory status: acceptable  Hydration status: acceptable

## 2021-08-13 LAB
CYTO UR: NORMAL
LAB AP CASE REPORT: NORMAL
PATH REPORT.FINAL DX SPEC: NORMAL
PATH REPORT.GROSS SPEC: NORMAL

## 2021-09-13 NOTE — SIGNIFICANT NOTE
Patient educated on the following :    - If you are receiving Sedation for your procedure Nothing to Eat 6 hours and only clear liquids for 2 hours prior to your procedure.        -You will need to have someone drive you home after your PROCEDURE and remain with you for 24 hours after the PROCEDURE  - The date of your procedure, your are welcome to have one visitor at bedside or remain within 10-15 minutes of [facility]  -You will need to arrive at    0930       on        9/16   for your PROCEDURE  -Please contact [facility] at: [phone] with any questions and/or concerns

## 2021-09-15 PROCEDURE — S0260 H&P FOR SURGERY: HCPCS | Performed by: ANESTHESIOLOGY

## 2021-09-16 ENCOUNTER — HOSPITAL ENCOUNTER (OUTPATIENT)
Facility: SURGERY CENTER | Age: 71
Setting detail: HOSPITAL OUTPATIENT SURGERY
Discharge: HOME | End: 2021-09-16
Attending: ANESTHESIOLOGY | Admitting: ANESTHESIOLOGY

## 2021-09-16 ENCOUNTER — HOSPITAL ENCOUNTER (OUTPATIENT)
Dept: GENERAL RADIOLOGY | Facility: SURGERY CENTER | Age: 71
Setting detail: HOSPITAL OUTPATIENT SURGERY
End: 2021-09-16

## 2021-09-16 VITALS
HEART RATE: 68 BPM | TEMPERATURE: 98 F | RESPIRATION RATE: 20 BRPM | DIASTOLIC BLOOD PRESSURE: 71 MMHG | OXYGEN SATURATION: 96 % | SYSTOLIC BLOOD PRESSURE: 126 MMHG | HEIGHT: 70 IN | BODY MASS INDEX: 19.33 KG/M2 | WEIGHT: 135 LBS

## 2021-09-16 DIAGNOSIS — M48.062 SPINAL STENOSIS OF LUMBAR REGION WITH NEUROGENIC CLAUDICATION: ICD-10-CM

## 2021-09-16 DIAGNOSIS — Z41.9 SURGERY, ELECTIVE: ICD-10-CM

## 2021-09-16 DIAGNOSIS — M54.16 LUMBAR RADICULOPATHY: ICD-10-CM

## 2021-09-16 PROCEDURE — 64483 NJX AA&/STRD TFRM EPI L/S 1: CPT | Performed by: ANESTHESIOLOGY

## 2021-09-16 PROCEDURE — 76000 FLUOROSCOPY <1 HR PHYS/QHP: CPT

## 2021-09-16 PROCEDURE — 25010000002 MIDAZOLAM PER 1 MG: Performed by: ANESTHESIOLOGY

## 2021-09-16 PROCEDURE — 25010000002 METHYLPREDNISOLONE PER 80 MG: Performed by: ANESTHESIOLOGY

## 2021-09-16 PROCEDURE — 3E0R3BZ INTRODUCTION OF ANESTHETIC AGENT INTO SPINAL CANAL, PERCUTANEOUS APPROACH: ICD-10-PCS | Performed by: ANESTHESIOLOGY

## 2021-09-16 PROCEDURE — 64484 NJX AA&/STRD TFRM EPI L/S EA: CPT | Performed by: ANESTHESIOLOGY

## 2021-09-16 PROCEDURE — 3E0R33Z INTRODUCTION OF ANTI-INFLAMMATORY INTO SPINAL CANAL, PERCUTANEOUS APPROACH: ICD-10-PCS | Performed by: ANESTHESIOLOGY

## 2021-09-16 PROCEDURE — 0 IOHEXOL 300 MG/ML SOLUTION 10 ML VIAL: Performed by: ANESTHESIOLOGY

## 2021-09-16 PROCEDURE — 25010000002 FENTANYL CITRATE (PF) 50 MCG/ML SOLUTION: Performed by: ANESTHESIOLOGY

## 2021-09-16 RX ORDER — MIDAZOLAM HYDROCHLORIDE 1 MG/ML
INJECTION INTRAMUSCULAR; INTRAVENOUS AS NEEDED
Status: DISCONTINUED | OUTPATIENT
Start: 2021-09-16 | End: 2021-09-16 | Stop reason: HOSPADM

## 2021-09-16 RX ORDER — SODIUM CHLORIDE 0.9 % (FLUSH) 0.9 %
10 SYRINGE (ML) INJECTION AS NEEDED
Status: DISCONTINUED | OUTPATIENT
Start: 2021-09-16 | End: 2021-09-16 | Stop reason: HOSPADM

## 2021-09-16 RX ORDER — SODIUM CHLORIDE 0.9 % (FLUSH) 0.9 %
10 SYRINGE (ML) INJECTION EVERY 12 HOURS SCHEDULED
Status: DISCONTINUED | OUTPATIENT
Start: 2021-09-16 | End: 2021-09-16 | Stop reason: HOSPADM

## 2021-09-16 RX ORDER — FENTANYL CITRATE 50 UG/ML
INJECTION, SOLUTION INTRAMUSCULAR; INTRAVENOUS AS NEEDED
Status: DISCONTINUED | OUTPATIENT
Start: 2021-09-16 | End: 2021-09-16 | Stop reason: HOSPADM
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
## 2021-09-16 NOTE — DISCHARGE INSTRUCTIONS
[facility] - Post-procedure Instructions          --  While there are no absolute restrictions, it is recommended that you do not perform strenuous activity today. In the morning, you may resume your level of activity as before your block.    --  If you have a band-aid at your injection site, please remove it later today. Observe the area for any redness, swelling, pus-like drainage, or a temperature over 101°. If any of these symptoms occur, please call your doctor at [phone]. If after office hours, leave a message and the on-call provider will return your call.    --  Ice may be applied to your injection site. It is recommended you avoid direct heat (heating pad; hot tub) for 1-2 days.    --  Call [facility] at [phone] if you experience persistent headache, persistent bleeding from the injection site, or severe pain not relieved by heat or oral medication.    --  Do not make important decisions today.    --  Due to the effects of the block and/or the I.V. Sedation, DO NOT drive or operate hazardous machinery for 12 hours.  Local anesthetics may cause numbness after procedure and precautions must be taken with regards to operating equipment as well as with walking, even if ambulating with assistance of another person or with an assistive device.    --  Do not drink alcohol for 12 hours.    -- You may return to work tomorrow, or as directed by your referring doctor.    --  Occasionally you may notice a slight increase in your pain after the procedure. This should start to improve within the next 24-48 hours. Radiofrequency ablation procedure pain may last 3-4 weeks.    --  It may take as long as 3-4 days before you notice a gradual improvement in your pain and/or other symptoms.    -- You may continue to take your prescribed pain medication as needed.    --  Some normal possible side effects of steroid use could include fluid retention, increased blood sugar, dull headache,  increased sweating, increased appetite, mood swings and flushing.    --  Diabetics are recommended to watch their blood glucose level closely for 24-48 hours after the injection.    --  Must stay in PACU for 20 min upon arrival and prove no leg weakness before being discharged.    --  IN THE EVENT OF A LIFE THREATENING EMERGENCY, (CHEST PAIN, BREATHING DIFFICULTIES, PARALYSIS…) YOU SHOULD GO TO YOUR NEAREST EMERGENCY ROOM.    --  You should be contacted by our office within 2-3 days to schedule follow up or next appointment date.  If not contacted within 7 days, please call the office at [phone]

## 2021-09-16 NOTE — OP NOTE
Lumbar Transforaminal Epidural Steroid Injection (two levels)  [facility]      PREOPERATIVE DIAGNOSIS:  left Lumbar Radiculopathy    POSTOPERATIVE DIAGNOSIS:  Same as preop diagnosis    PROCEDURE:    1. CPT 56107 --  Diagnostic Transforaminal Epidural Steroid Injection at the L2 level, on the left   2. CPT 90890 --  Diagnostic Transforaminal Epidural Steroid Injection at the L5 level, on the left     PRE-PROCEDURE DISCUSSION WITH PATIENT:    Risks and complications were discussed with the patient prior to starting the procedure and informed consent was obtained.  We discussed various topics including but not limited to bleeding, infection, injury, nerve injury, paralysis, coma, death, postprocedural painful flare-up, postprocedural site soreness, and a lack of pain relief.  We discussed the diagnostic aspect of transforaminal epidural / selective nerve root blockade.    SURGEON:  Steven Corcoran MD    REASON FOR PROCEDURE:    Previous clinically significant therapeutic effect is noted., Degenerative changes are noted in the area., Radiating pattern of pain is likely consistent with degenerative changes in the area., Radicular pain pattern seems consistent with this dermatome., Acute pain flareup is noted & problematic, and the injection is used to attempt to break the flareup.   and Interlaminar approach is relatively contraindicated.      SEDATION:  Versed 4mg & Fentanyl 50 mcg IV  ANESTHETIC:  Marcaine 0.25%  STEROID:  Methylprednisolone (DEPO MEDROL) 80mg/ml    DESCRIPTON OF PROCEDURE:  After obtaining informed consent, an I.V. was started in the preoperative area. The patient taken to the operating room and was placed in the prone position with a pillow under the abdomen.  All pressure points were well padded.  EKG, blood pressure, and pulse oximeter were monitored.  The lumbar area was prepped with Chloraprep and draped in a sterile fashion. Under fluoroscopic guidance in an oblique  dimension on the above mentioned side, the transverse process of the first aforementioned vertebra at the junction of the body at 6 o'clock position was identified. Skin and subcutaneous tissue was anesthetized with 1% lidocaine. A 22-gauge spinal needle was introduced under fluoroscopic guidance at the above junction into the foramen without parasthesias and into the epidural space. After confirming the position of the needle with PA, lateral, and oblique fluoroscopic views, aspiration was checked and was clear of blood or CSF.  Next, 1 mL of Omnipaque was injected. After seeing adequate spread on the corresponding nerve root, a total volume 2mL of injectate containing local anesthetic as above and half of the above mentioned corticosteroid was injected into the epidural space.  The needle was removed intact.      Next, in similar fashion, the second level mentioned above was addressed and a similar amount of injectate was delivered after similar imaging was achieved.      Vital signs were stable throughout.          ESTIMATED BLOOD LOSS:  <5 mL  SPECIMENS:  none    COMPLICATIONS:   No complications were noted., There was no indication of vascular uptake on live injection of contrast dye., There was no indication of intrathecal uptake on live injection of contrast dye., There was not any evidence of dural puncture.   and The patient did not have any signs of postprocedure numbness nor weakness.    TOLERANCE & DISCHARGE CONDITION:    The patient tolerated the procedure well.  The patient was transported to the recovery area without difficulties.  The patient was discharged to home under the care of family in stable and satisfactory condition.    PLAN OF CARE:  1. The patient was given our standard instruction sheet.  2. The patient will Return to clinic 2-3 wks.  3. The patient will resume all medications as per the medication reconciliation sheet.

## 2021-09-28 ENCOUNTER — TRANSCRIBE ORDERS (OUTPATIENT)
Dept: SURGERY | Facility: SURGERY CENTER | Age: 71
End: 2021-09-28

## 2021-09-28 ENCOUNTER — PREP FOR SURGERY (OUTPATIENT)
Dept: SURGERY | Facility: SURGERY CENTER | Age: 71
End: 2021-09-28

## 2021-09-28 ENCOUNTER — OFFICE VISIT (OUTPATIENT)
Dept: PAIN MEDICINE | Facility: CLINIC | Age: 71
End: 2021-09-28

## 2021-09-28 VITALS
BODY MASS INDEX: 19.47 KG/M2 | TEMPERATURE: 97.8 F | WEIGHT: 136 LBS | RESPIRATION RATE: 20 BRPM | HEART RATE: 89 BPM | OXYGEN SATURATION: 99 % | DIASTOLIC BLOOD PRESSURE: 74 MMHG | HEIGHT: 70 IN | SYSTOLIC BLOOD PRESSURE: 138 MMHG

## 2021-09-28 DIAGNOSIS — M48.062 SPINAL STENOSIS OF LUMBAR REGION WITH NEUROGENIC CLAUDICATION: Primary | ICD-10-CM

## 2021-09-28 DIAGNOSIS — M79.18 PIRIFORMIS MUSCLE PAIN: ICD-10-CM

## 2021-09-28 DIAGNOSIS — M54.16 LUMBAR RADICULOPATHY: ICD-10-CM

## 2021-09-28 DIAGNOSIS — M48.062 SPINAL STENOSIS OF LUMBAR REGION WITH NEUROGENIC CLAUDICATION: ICD-10-CM

## 2021-09-28 DIAGNOSIS — G89.29 OTHER CHRONIC PAIN: Primary | ICD-10-CM

## 2021-09-28 PROCEDURE — 99214 OFFICE O/P EST MOD 30 MIN: CPT | Performed by: NURSE PRACTITIONER

## 2021-09-28 RX ORDER — SODIUM CHLORIDE 0.9 % (FLUSH) 0.9 %
10 SYRINGE (ML) INJECTION EVERY 12 HOURS SCHEDULED
Status: CANCELLED | OUTPATIENT
Start: 2021-09-28

## 2021-09-28 RX ORDER — SODIUM CHLORIDE 0.9 % (FLUSH) 0.9 %
10 SYRINGE (ML) INJECTION AS NEEDED
Status: CANCELLED | OUTPATIENT
Start: 2021-09-28

## 2021-09-28 NOTE — PROGRESS NOTES
CHIEF COMPLAINT  F/u back pain. Pt had transforaminal epidural steroid injection left Lumbar 2 and lumbar 5 and sts receiving only a day of relief.     Subjective   Jose Villafuerte is a 71 y.o. male  who presents for follow-up.  He has a history of chronic low back, buttock and left leg pain. Reports his pain is VARIABLE since last evaluation.    Patient presents for follow-up of PROCEDURE. Patient had a TF TOM Left L2 and L5 performed by Dr. HERNANDEZ on 9-16-21 for management of LOW BACK AND LEG PAIN. Patient reports SIGNIFICANT TEMPORARY relief from the procedure.     AT last office visit, we discussed consideration for SCS.  Had completed  BS SCS education session.  Sent for psychological evaluation. He did not have a good outcome on the psychological evaluation.     He drinks approximately 12 beers/day.  He is not interested in discontinuing alcohol.  He is not interested in decreasing alcohol intake.    Saw Dr Mackey. Plan was for surgery but that was cancelled(May 2019). Referred to pain management by Dr Hinton. Has not seen patient recently.     Dr Oviedo referred pt to Dr. Hinton for orthopedic surgery. Pt went to [facility] on 5/[facility]/19 to have Left L5-S1 laminectomy, foraminotomies and medial facetectomy with metrx with Dr. Mackey, but surgery was cancelled due to pt hx of alcohol use. Dr. Mackey declined operating. Pt sts he currently drinks 12 cans of beer daily throughout the day. Pt sts he also smokes marijuana once a month to control pain.      Has had both COVID vaccines.    Patient remained masked during entire encounter. No cough present. I donned a mask and eye protection throughout entire visit. Prior to donning mask and eye protection, hand hygiene was performed, as well as when it was doffed.  I was closer than 6 feet, but not for an extended period of time. No obvious exposure to any bodily fluids.    Back Pain  This is a chronic problem. The current episode started more than 1 year ago.  The problem occurs constantly. The problem has been waxing and waning (varaible since last evaluation) since onset. The pain is present in the lumbar spine. The quality of the pain is described as aching. The pain is at a severity of 5/10. The pain is moderate. The pain is worse during the day. The symptoms are aggravated by bending, sitting and position. Pertinent negatives include no bladder incontinence, bowel incontinence, chest pain, dysuria, fever, headaches, numbness or weakness. Risk factors include sedentary lifestyle and lack of exercise. He has tried bed rest, heat, home exercises, ice, NSAIDs and walking for the symptoms. The treatment provided mild relief.      Procedure List  -- 9-16-21-- TF TOM left L2 and L5  -- 6-15-21-- TF TOM Left L2 and L5  -- 4-28-21-- left piriformis  -- 3-3-21--- TF TOM left L2 and L5-- Patient reports 80% relief from the procedure for 2 weeks. 40% relief for several weeks  -- 12-17-20-- left piriformis  -- 9-15-20-- TF TOM left L5 and left piriformis  -- 8-13-20--  TF TOM left L5  -- 6-10-20-- left piriformis  -- 3-3-30-- TF TOM left L5  -- 2-4-20-- TF TOM right L2 and L5  -- 12-8-19-- LEFT SI-- 20% relief  -- 11-13-19-- left piriformis  -- 10-10-19-- left piriformis    Dr Lyn Pre-surgical evaluation 9-13-21      PEG Assessment   What number best describes your pain on average in the past week?5  What number best describes how, during the past week, pain has interfered with your enjoyment of life?5  What number best describes how, during the past week, pain has interfered with your general activity?  5    The following portions of the patient's history were reviewed and updated as appropriate: allergies, current medications, past family history, past medical history, past social history, past surgical history and problem list.    Review of Systems   Constitutional: Negative for activity change, fatigue and fever.   HENT: Negative for congestion.    Eyes: Negative for  "visual disturbance.   Respiratory: Negative for cough and shortness of breath.    Cardiovascular: Negative for chest pain.   Gastrointestinal: Negative for bowel incontinence, constipation and diarrhea.   Endocrine: Negative for polyuria.   Genitourinary: Negative for bladder incontinence, difficulty urinating and dysuria.   Musculoskeletal: Positive for back pain.   Allergic/Immunologic: Negative for immunocompromised state.   Neurological: Negative for dizziness, weakness, light-headedness, numbness and headaches.   Psychiatric/Behavioral: Negative for agitation, sleep disturbance and suicidal ideas. The patient is not nervous/anxious.      I have reviewed and confirmed the accuracy of the ROS as documented by the MA/LPN/RN Ryann Manning, APRN      Vitals:    09/28/21 1126   BP: 138/74   Pulse: 89   Resp: 20   Temp: 97.8 °F (36.6 °C)   SpO2: 99%   Weight: 61.7 kg (136 lb)   Height: 177.8 cm (70\")   PainSc:   5   PainLoc: Back       Objective   Physical Exam  Vitals and nursing note reviewed.   Constitutional:       Appearance: Normal appearance. He is well-developed.   HENT:      Head: Normocephalic and atraumatic.   Musculoskeletal:      Lumbar back: Tenderness present. Decreased range of motion.      Comments: + SLR on left, negative on right   Skin:     General: Skin is warm and dry.   Neurological:      Mental Status: He is alert.      Gait: Gait abnormal.      Deep Tendon Reflexes:      Reflex Scores:       Patellar reflexes are 2+ on the right side and 1+ on the left side.  Psychiatric:         Speech: Speech normal.         Behavior: Behavior normal. Behavior is cooperative.         Thought Content: Thought content normal.         Judgment: Judgment normal.         Assessment/Plan   Diagnoses and all orders for this visit:    1. Other chronic pain (Primary)    2. Spinal stenosis of lumbar region with neurogenic claudication    3. Lumbar radiculopathy    4. Piriformis muscle pain      --- He wants to " continue to receive epidurals. He can have a repeat TFLESI after 12-16-21. He wants to schedule this.   --- Reviewed he no is not a candidate for SCS therapy at this time. See Dr Lyn evaluation above. Reviewed at length wit patient.   --- Follow-up after procedure or sooner if needed.       YUNIEL REPORT    As the clinician, I personally reviewed the YUNIEL from 9-28-21 while the patient was in the office today.           Dictated utilizing Dragon dictation.

## 2021-10-25 ENCOUNTER — TELEPHONE (OUTPATIENT)
Dept: PAIN MEDICINE | Facility: CLINIC | Age: 71
End: 2021-10-25

## 2021-10-25 NOTE — TELEPHONE ENCOUNTER
Mr. Villafuerte called today and states that he didn't get pain relief from his last injection and can't have another one until Dec. He wants to know what he can do for the pain.

## 2021-10-25 NOTE — TELEPHONE ENCOUNTER
Unfortunately, I don't have a great answer for him. We cannot prescribe controlled substances with his alcohol and THC use. He could try OTC Tylenol or Ibuprofen. Also can try PT. Please review red flag symptoms. Thanks. BB    Reviewed RED FLAG SYMPTOMS, including but not limited to-- fever, chills, stiff neck, headache, flu-like symptoms, increased tenderness and redness. Also reviewed loss of bowel and bladder control and saddle anesthesia. If patient notices this, he is to call office immediately.

## 2021-10-25 NOTE — TELEPHONE ENCOUNTER
Caller: CHACHA SANDY     Relationship to patient: PATIENT     Best call back number: [phone]    Patient is needing: PATIENT HAD INJECTION WITH DR HERNANDEZ AND IT DID NOT HELP , AND IT IS NOW MOVING TO HIS RIGHT SIDE AND IS HAVING A LOT OF PAIN AND WOULD LIKE TO TALK WITH DAGOBERTO LANGLEY TO DISCUSS     PATIENT IS USUALLY OUT BETWEEN 11-2 AND WOULD LIKE A CALL BEFORE THAT TIME OR AFTER

## 2021-10-26 NOTE — TELEPHONE ENCOUNTER
Called and spoke with pt. He refuses PT, otc tylenol or motrin. He sts he would like to have a SCS. He wants to know how to go about getting a SCS. Please advise. Thank you.

## 2021-10-26 NOTE — TELEPHONE ENCOUNTER
We already discussed this at last office visit. He did not have a positive psychological assessment and unfortunately cannot be considered for SCS. Thanks. ANGIE

## 2021-10-28 ENCOUNTER — OFFICE VISIT (OUTPATIENT)
Dept: INTERNAL MEDICINE | Facility: CLINIC | Age: 71
End: 2021-10-28

## 2021-10-28 VITALS
OXYGEN SATURATION: 98 % | BODY MASS INDEX: 19.5 KG/M2 | WEIGHT: 136.2 LBS | HEART RATE: 102 BPM | SYSTOLIC BLOOD PRESSURE: 130 MMHG | DIASTOLIC BLOOD PRESSURE: 82 MMHG | HEIGHT: 70 IN | RESPIRATION RATE: 16 BRPM | TEMPERATURE: 97.5 F

## 2021-10-28 DIAGNOSIS — M48.062 SPINAL STENOSIS OF LUMBAR REGION WITH NEUROGENIC CLAUDICATION: ICD-10-CM

## 2021-10-28 DIAGNOSIS — I10 ESSENTIAL HYPERTENSION: Primary | ICD-10-CM

## 2021-10-28 PROBLEM — M54.9 BACK PAIN: Status: ACTIVE | Noted: 2021-10-28

## 2021-10-28 PROCEDURE — 99213 OFFICE O/P EST LOW 20 MIN: CPT | Performed by: FAMILY MEDICINE

## 2021-10-28 NOTE — PROGRESS NOTES
"Chief Complaint  Hypertension (6 month follow up )    Subjective          Jose Villafuerte presents to [facility]  History of Present Illness     Hypertension - stable.  Patient taking medication as prescribed.  Denies chest pain, shortness of breath, headache, lower extremity edema.  Patient is taking lisinopril 40 mg, amlodipine 10 mg.    Would like second opinion regarding his chronic back pain as he was turned down for a stimulator due to his alcohol dependence.  He would like to see if someone else can help him.      Objective   Vital Signs:   /82 (BP Location: Left arm, Patient Position: Sitting, Cuff Size: Adult)   Pulse 102   Temp 97.5 °F (36.4 °C) (Temporal)   Resp 16   Ht 177.8 cm (70\")   Wt 61.8 kg (136 lb 3.2 oz)   SpO2 98%   BMI 19.54 kg/m²     Physical Exam  Vitals and nursing note reviewed.   Constitutional:       General: He is not in acute distress.     Appearance: Normal appearance.   Cardiovascular:      Rate and Rhythm: Normal rate and regular rhythm.      Heart sounds: Normal heart sounds. No murmur heard.      Pulmonary:      Effort: Pulmonary effort is normal.      Breath sounds: Normal breath sounds.   Neurological:      Mental Status: He is alert.        Result Review :   The following data was reviewed by: John Etienne MD on 10/28/2021:  Common labs    Common Labsle 4/23/21 4/23/21 4/23/21    1129 1129 1129   Glucose  89    BUN  4 (A)    Creatinine  0.59 (A)    eGFR Non  Am  135    eGFR African Am  >150    Sodium  135 (A)    Potassium  4.5    Chloride  95 (A)    Calcium  10.0    Total Protein  7.4    Albumin  4.70    Total Bilirubin  0.6    Alkaline Phosphatase  120 (A)    AST (SGOT)  36    ALT (SGPT)  22    WBC 12.35 (A)     Hemoglobin 16.8     Hematocrit 48.5     Platelets 452 (A)     Total Cholesterol   201 (A)   Triglycerides   81   HDL Cholesterol   78 (A)   LDL Cholesterol    109 (A)   (A) Abnormal value       Comments are " available for some flowsheets but are not being displayed.                     Assessment and Plan    Diagnoses and all orders for this visit:    1. Essential hypertension (Primary)  -     Comprehensive Metabolic Panel    2. Spinal stenosis of lumbar region with neurogenic claudication  -     Ambulatory Referral to Pain Management      Stable HTN.   Continue current medications as above and will get a CMP.    Place referral to pain management per patient request.  However I cautioned him that many providers may not feel comfortable doing that kind of procedure if someone is drinking 12 drinks per day as he is currently.      Follow Up   Return in about 6 months (around 5/2/2022) for Medicare Wellness.  Patient was given instructions and counseling regarding his condition or for health maintenance advice. Please see specific information pulled into the AVS if appropriate.

## 2021-10-29 LAB
ALBUMIN SERPL-MCNC: 4.4 G/DL (ref 3.7–4.7)
ALBUMIN/GLOB SERPL: 1.8 {RATIO} (ref 1.2–2.2)
ALP SERPL-CCNC: 125 IU/L (ref 44–121)
ALT SERPL-CCNC: 22 IU/L (ref 0–44)
AST SERPL-CCNC: 33 IU/L (ref 0–40)
BILIRUB SERPL-MCNC: 0.5 MG/DL (ref 0–1.2)
BUN SERPL-MCNC: 5 MG/DL (ref 8–27)
BUN/CREAT SERPL: 9 (ref 10–24)
CALCIUM SERPL-MCNC: 9.4 MG/DL (ref 8.6–10.2)
CHLORIDE SERPL-SCNC: 97 MMOL/L (ref 96–106)
CO2 SERPL-SCNC: 21 MMOL/L (ref 20–29)
CREAT SERPL-MCNC: 0.55 MG/DL (ref 0.76–1.27)
GLOBULIN SER CALC-MCNC: 2.5 G/DL (ref 1.5–4.5)
GLUCOSE SERPL-MCNC: 94 MG/DL (ref 65–99)
POTASSIUM SERPL-SCNC: 4.5 MMOL/L (ref 3.5–5.2)
PROT SERPL-MCNC: 6.9 G/DL (ref 6–8.5)
SODIUM SERPL-SCNC: 133 MMOL/L (ref 134–144)

## 2021-12-21 ENCOUNTER — HOSPITAL ENCOUNTER (OUTPATIENT)
Facility: SURGERY CENTER | Age: 71
Setting detail: HOSPITAL OUTPATIENT SURGERY
Discharge: HOME | End: 2021-12-21
Attending: ANESTHESIOLOGY | Admitting: ANESTHESIOLOGY

## 2021-12-21 ENCOUNTER — HOSPITAL ENCOUNTER (OUTPATIENT)
Dept: GENERAL RADIOLOGY | Facility: SURGERY CENTER | Age: 71
Setting detail: HOSPITAL OUTPATIENT SURGERY
End: 2021-12-21

## 2021-12-21 VITALS
OXYGEN SATURATION: 92 % | RESPIRATION RATE: 16 BRPM | TEMPERATURE: 98.4 F | HEIGHT: 67 IN | SYSTOLIC BLOOD PRESSURE: 114 MMHG | HEART RATE: 76 BPM | DIASTOLIC BLOOD PRESSURE: 68 MMHG | WEIGHT: 140 LBS | BODY MASS INDEX: 21.97 KG/M2

## 2021-12-21 DIAGNOSIS — M54.16 LUMBAR RADICULOPATHY: ICD-10-CM

## 2021-12-21 DIAGNOSIS — M48.062 SPINAL STENOSIS OF LUMBAR REGION WITH NEUROGENIC CLAUDICATION: ICD-10-CM

## 2021-12-21 PROCEDURE — 3E0R3BZ INTRODUCTION OF ANESTHETIC AGENT INTO SPINAL CANAL, PERCUTANEOUS APPROACH: ICD-10-PCS | Performed by: ANESTHESIOLOGY

## 2021-12-21 PROCEDURE — 25010000002 METHYLPREDNISOLONE PER 40 MG: Performed by: ANESTHESIOLOGY

## 2021-12-21 PROCEDURE — 64484 NJX AA&/STRD TFRM EPI L/S EA: CPT | Performed by: ANESTHESIOLOGY

## 2021-12-21 PROCEDURE — 77002 NEEDLE LOCALIZATION BY XRAY: CPT

## 2021-12-21 PROCEDURE — 25010000002 MIDAZOLAM PER 1 MG: Performed by: ANESTHESIOLOGY

## 2021-12-21 PROCEDURE — 64483 NJX AA&/STRD TFRM EPI L/S 1: CPT | Performed by: ANESTHESIOLOGY

## 2021-12-21 PROCEDURE — 0 IOHEXOL 300 MG/ML SOLUTION 10 ML VIAL: Performed by: ANESTHESIOLOGY

## 2021-12-21 PROCEDURE — 25010000002 FENTANYL CITRATE (PF) 50 MCG/ML SOLUTION: Performed by: ANESTHESIOLOGY

## 2021-12-21 PROCEDURE — S0260 H&P FOR SURGERY: HCPCS | Performed by: ANESTHESIOLOGY

## 2021-12-21 PROCEDURE — 76000 FLUOROSCOPY <1 HR PHYS/QHP: CPT

## 2021-12-21 PROCEDURE — 25010000002 METHYLPREDNISOLONE PER 80 MG: Performed by: ANESTHESIOLOGY

## 2021-12-21 RX ORDER — SODIUM CHLORIDE 0.9 % (FLUSH) 0.9 %
10 SYRINGE (ML) INJECTION AS NEEDED
Status: DISCONTINUED | OUTPATIENT
Start: 2021-12-21 | End: 2021-12-21 | Stop reason: HOSPADM

## 2021-12-21 RX ORDER — MIDAZOLAM HYDROCHLORIDE 1 MG/ML
INJECTION INTRAMUSCULAR; INTRAVENOUS AS NEEDED
Status: DISCONTINUED | OUTPATIENT
Start: 2021-12-21 | End: 2021-12-21 | Stop reason: HOSPADM

## 2021-12-21 RX ORDER — SODIUM CHLORIDE 0.9 % (FLUSH) 0.9 %
10 SYRINGE (ML) INJECTION EVERY 12 HOURS SCHEDULED
Status: DISCONTINUED | OUTPATIENT
Start: 2021-12-21 | End: 2021-12-21 | Stop reason: HOSPADM

## 2021-12-21 RX ORDER — FENTANYL CITRATE 50 UG/ML
INJECTION, SOLUTION INTRAMUSCULAR; INTRAVENOUS AS NEEDED
Status: DISCONTINUED | OUTPATIENT
Start: 2021-12-21 | End: 2021-12-21 | Stop reason: HOSPADM
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
## 2021-12-21 NOTE — DISCHARGE INSTRUCTIONS
[facility] - Post-procedure Instructions          --  While there are no absolute restrictions, it is recommended that you do not perform strenuous activity today. In the morning, you may resume your level of activity as before your block.    --  If you have a band-aid at your injection site, please remove it later today. Observe the area for any redness, swelling, pus-like drainage, or a temperature over 101°. If any of these symptoms occur, please call your doctor at [phone]. If after office hours, leave a message and the on-call provider will return your call.    --  Ice may be applied to your injection site. It is recommended you avoid direct heat (heating pad; hot tub) for 1-2 days.    --  Call [facility] at [phone] if you experience persistent headache, persistent bleeding from the injection site, or severe pain not relieved by heat or oral medication.    --  Do not make important decisions today.    --  Due to the effects of the block and/or the I.V. Sedation, DO NOT drive or operate hazardous machinery for 12 hours.  Local anesthetics may cause numbness after procedure and precautions must be taken with regards to operating equipment as well as with walking, even if ambulating with assistance of another person or with an assistive device.    --  Do not drink alcohol for 12 hours.    -- You may return to work tomorrow, or as directed by your referring doctor.    --  Occasionally you may notice a slight increase in your pain after the procedure. This should start to improve within the next 24-48 hours. Radiofrequency ablation procedure pain may last 3-4 weeks.    --  It may take as long as 3-4 days before you notice a gradual improvement in your pain and/or other symptoms.    -- You may continue to take your prescribed pain medication as needed.    --  Some normal possible side effects of steroid use could include fluid retention, increased blood sugar, dull headache,  increased sweating, increased appetite, mood swings and flushing.    --  Diabetics are recommended to watch their blood glucose level closely for 24-48 hours after the injection.    --  Must stay in PACU for 20 min upon arrival and prove no leg weakness before being discharged.    --  IN THE EVENT OF A LIFE THREATENING EMERGENCY, (CHEST PAIN, BREATHING DIFFICULTIES, PARALYSIS…) YOU SHOULD GO TO YOUR NEAREST EMERGENCY ROOM.    --  You should be contacted by our office within 2-3 days to schedule follow up or next appointment date.  If not contacted within 7 days, please call the office at [phone]

## 2022-02-01 ENCOUNTER — OFFICE VISIT (OUTPATIENT)
Dept: PAIN MEDICINE | Facility: CLINIC | Age: 72
End: 2022-02-01

## 2022-02-01 ENCOUNTER — PREP FOR SURGERY (OUTPATIENT)
Dept: SURGERY | Facility: SURGERY CENTER | Age: 72
End: 2022-02-01

## 2022-02-01 ENCOUNTER — TRANSCRIBE ORDERS (OUTPATIENT)
Dept: SURGERY | Facility: SURGERY CENTER | Age: 72
End: 2022-02-01

## 2022-02-01 VITALS
OXYGEN SATURATION: 98 % | HEIGHT: 67 IN | DIASTOLIC BLOOD PRESSURE: 74 MMHG | SYSTOLIC BLOOD PRESSURE: 134 MMHG | BODY MASS INDEX: 21.19 KG/M2 | HEART RATE: 90 BPM | WEIGHT: 135 LBS | TEMPERATURE: 97.5 F

## 2022-02-01 DIAGNOSIS — M48.062 SPINAL STENOSIS OF LUMBAR REGION WITH NEUROGENIC CLAUDICATION: Primary | ICD-10-CM

## 2022-02-01 DIAGNOSIS — Z41.9 SURGERY, ELECTIVE: Primary | ICD-10-CM

## 2022-02-01 DIAGNOSIS — M54.16 LUMBAR RADICULOPATHY: ICD-10-CM

## 2022-02-01 DIAGNOSIS — M48.062 SPINAL STENOSIS OF LUMBAR REGION WITH NEUROGENIC CLAUDICATION: ICD-10-CM

## 2022-02-01 DIAGNOSIS — M79.18 PIRIFORMIS MUSCLE PAIN: ICD-10-CM

## 2022-02-01 DIAGNOSIS — G89.29 OTHER CHRONIC PAIN: Primary | ICD-10-CM

## 2022-02-01 PROCEDURE — 99214 OFFICE O/P EST MOD 30 MIN: CPT | Performed by: NURSE PRACTITIONER

## 2022-02-01 RX ORDER — SODIUM CHLORIDE 0.9 % (FLUSH) 0.9 %
10 SYRINGE (ML) INJECTION AS NEEDED
Status: CANCELLED | OUTPATIENT
Start: 2022-02-01

## 2022-02-01 RX ORDER — SODIUM CHLORIDE 0.9 % (FLUSH) 0.9 %
10 SYRINGE (ML) INJECTION EVERY 12 HOURS SCHEDULED
Status: CANCELLED | OUTPATIENT
Start: 2022-02-01

## 2022-02-01 NOTE — PROGRESS NOTES
CHIEF COMPLAINT  F/u for back pain. Pt states the pain is returning now that the injection is hardik away.     Subjective   Jose Villafuerte is a 71 y.o. male  who presents for follow-up.  He has a history of chronic back pain. Reports his pain is variable since last evaluation.    Patient presents for follow-up of PROCEDURE. Patient had a TF TOM Left L2 and L5 performed by Dr. HERNANDEZ on 12-21-21 for management of LOW BACK AND LEG PAIN. Patient reports 65% relief from the procedure. It is just now starting to wear off. Would like to repeat procedure.    Complains of pain in his left low back and left leg. Today his pain is 7/10VAS. Describes the pain as continuous throbbing with intermittent sharp pain. Pain worsens as the day progresses, interferes with activity. Does not seem to interfere with sleep.  No relief with Tylenol or Ibuprofen. NO relief with Salonpas patches previously. ADL's by self.  Denies any bowel or bladder incontinence.     Patient had a TF TOM Left L2 and L5 performed by Dr. HERNANDEZ on 9-16-21 for management of LOW BACK AND LEG PAIN. Patient reports SIGNIFICANT TEMPORARY relief from the procedure.     Previously, we discussed consideration for SCS.  Had completed  BS SCS education session.  Sent for psychological evaluation. He did not have a good outcome on the psychological evaluation. Thus we did not move forward with this.     He drinks approximately 12 beers/day.  He is not interested in discontinuing alcohol.  He is not interested in decreasing alcohol intake.     Saw Dr Mackey. Plan was for surgery but that was cancelled(May 2019). Referred to pain management by Dr Hinton. Has not seen patient recently.      Dr Oviedo referred pt to Dr. Hinton for orthopedic surgery. Pt went to [facility] on 5/3/19 to have Left L5-S1 laminectomy, foraminotomies and medial facetectomy with metrx with Dr. Mackey, but surgery was cancelled due to pt hx of alcohol use. Dr. Mackey declined operating.  Pt sts he currently drinks 12 cans of beer daily throughout the day. Pt sts he also smokes marijuana once a month to control pain.     Did ask about handicap parking.    Patient remained masked during entire encounter. No cough present. I donned a mask and eye protection throughout entire visit. Prior to donning mask and eye protection, hand hygiene was performed, as well as when it was doffed.  I was closer than 6 feet, but not for an extended period of time. No obvious exposure to any bodily fluids.    Back Pain  This is a chronic problem. The current episode started more than 1 year ago. The problem occurs constantly. The problem has been waxing and waning (varaible since last evaluation) since onset. The pain is present in the lumbar spine. The quality of the pain is described as aching. The pain is at a severity of 7/10. The pain is moderate. The pain is worse during the day. The symptoms are aggravated by bending, sitting and position. Pertinent negatives include no bladder incontinence, bowel incontinence, chest pain, dysuria, fever, headaches, numbness or weakness. Risk factors include sedentary lifestyle and lack of exercise. He has tried bed rest, heat, home exercises, ice, NSAIDs and walking for the symptoms. The treatment provided mild relief.        Procedure List  --12-21-21-- TF TOM Left L2 and L5  -- 9-16-21-- TF TOM left L2 and L5  -- 6-15-21-- TF TOM Left L2 and L5  -- 4-28-21-- left piriformis  -- 3-3-21--- TF TOM left L2 and L5-- Patient reports 80% relief from the procedure for 2 weeks. 40% relief for several weeks  -- 12-17-20-- left piriformis  -- 9-15-20-- TF TOM left L5 and left piriformis  -- 8-13-20--  TF TOM left L5  -- 6-10-20-- left piriformis  -- 3-3-30-- TF TOM left L5  -- 2-4-20-- TF TOM right L2 and L5  -- 12-8-19-- LEFT SI-- 20% relief  -- 11-13-19-- left piriformis  -- 10-10-19-- left piriformis    Dr Campa office visit 10-28-21    Narrative & Impression   MRI LUMBAR SPINE  WITHOUT CONTRAST 02/04/2021      CLINICAL HISTORY: Spinal stenosis lumbosacral region, patient had low  back pain for greater than 6 weeks, pain radiates now into left leg for  several years, lumbar radiculopathy.     TECHNIQUE: Sagittal T1, proton density and fat-suppressed T2-weighted  images were obtained of the lumbar spine, in addition axial T2-weighted  images were obtained from T12 to S2 thin cut axial T1-weighted images  were obtained angled through the interspaces from T12 to S1.     COMPARISON: This is correlated to the postmyelogram CT of the lumbar  spine 12/24/2018. At T12-L1, the posterior disc margin and facets are  normal with no canal or foraminal narrowing.     FINDINGS: The distal thoracic cord and the conus is normal in signal  intensity. The conus terminates at the L1-2 interspace level which is  normal.     There is a mild chronic compression fracture involving the anterior  superior body and endplate of L1 with about 25% loss of anterior  vertebral body height. The marrow signal intensity of the L1 vertebra is  normal indicating that this is a chronic compression fracture. There is  chronic compression deformity was present post myelogram CT lumbar spine  on 12/24/2018 and is unchanged.     At L1-L2, the posterior disc margin and facets are normal with no canal  or foraminal narrowing.     At L2-L3, disc space and facets are normal without canal or foraminal  narrowing.     At L3-L4, there is minimal disc desiccation, 1 mm retrolisthesis of L3  on L4, minimal posterior disc bulge. The facets are normal. There is no  canal or foraminal narrowing.     At L4-L5, there is minimal facet overgrowth. There is mild posterior  disc bulge. There is no canal or foraminal narrowing.     At L5-S1, there is mild bilateral facet overgrowth. There is disc space  narrowing and degenerative endplate changes, 3 mm retrolisthesis of L5  with respect to S1 with slight uncovering of the posterior inferior  aspect  of L5-S1 disc space, disc material bulging along the posterior  superior endplate of S1 comes close to the ventral aspect of the  traversing right S1 nerve root, does not have mass effect on it or  compress it. There is no significant canal or lateral recess narrowing.  There is spurring into the inferior aspect of the foramina, mild left  and mild-to-moderate right bony foraminal narrowing.     IMPRESSION:  1. No change when compared to postmyelogram CT of the lumbar spine from  [facility] 12/24/2018.  2. Mild chronic compression deformity of the anterior superior body and  endplate of L1 with 25% loss of anterior vertebral body height.  3. Very minimal lumbar spondylosis as described.  No disc herniation,  canal or foraminal narrowing seen from T12 to L5. At L5-S1, there is 3  mm retrolisthesis of L5 on S1, uncovered bulging disc material extending  along the posterior superior endplate of S1 that abuts the ventral  aspect of the traversing right S1 nerve root, does not displace or  compress it. There is spurring into the foramina, mild left and  mild-to-moderate right bony foraminal narrowing at L5-S1. The remainder  of the lumbar spine MRI is unremarkable.     This report was finalized on 2/5/2021 8:49 AM by Dr. Luacs Pennington M.D.       The following portions of the patient's history were reviewed and updated as appropriate: allergies, current medications, past family history, past medical history, past social history, past surgical history and problem list.    Review of Systems   Constitutional: Negative for activity change, fatigue and fever.   HENT: Negative for congestion.    Eyes: Negative for visual disturbance.   Respiratory: Negative for cough and shortness of breath.    Cardiovascular: Negative for chest pain.   Gastrointestinal: Negative for bowel incontinence, constipation and diarrhea.   Endocrine: Negative for polyuria.   Genitourinary: Negative for bladder incontinence, difficulty  "urinating and dysuria.   Musculoskeletal: Positive for back pain.   Allergic/Immunologic: Negative for immunocompromised state.   Neurological: Negative for dizziness, weakness, light-headedness, numbness and headaches.   Psychiatric/Behavioral: Negative for agitation, sleep disturbance and suicidal ideas. The patient is not nervous/anxious.      I have reviewed and confirmed the accuracy of the ROS as documented by the MA/LPN/RN JENNIFER Perez      Vitals:    02/01/22 1131   BP: 134/74   Pulse: 90   Temp: 97.5 °F (36.4 °C)   SpO2: 98%   Weight: 61.2 kg (135 lb)   Height: 170.2 cm (67.01\")   PainSc:   7   PainLoc: Back         Objective   Physical Exam  Vitals and nursing note reviewed.   Constitutional:       Appearance: Normal appearance. He is well-developed.   HENT:      Head: Normocephalic and atraumatic.   Musculoskeletal:      Lumbar back: Tenderness present. Decreased range of motion.      Comments: + SLR on left, negative on right   Skin:     General: Skin is warm and dry.   Neurological:      Mental Status: He is alert.      Gait: Gait abnormal.      Deep Tendon Reflexes:      Reflex Scores:       Patellar reflexes are 2+ on the right side and 1+ on the left side.  Psychiatric:         Speech: Speech normal.         Behavior: Behavior normal. Behavior is cooperative.         Thought Content: Thought content normal.         Judgment: Judgment normal.         Assessment/Plan   Diagnoses and all orders for this visit:    1. Other chronic pain (Primary)    2. Spinal stenosis of lumbar region with neurogenic claudication    3. Lumbar radiculopathy    4. Piriformis muscle pain      --- repeat TF TOM left L2 and L5. TO BE DONE AFTER 3-15-22. No blood thinners. Reviewed the procedure at length with the patient.  Included in the review was expectations, complications, risk and benefits.The procedure was described in detail and the risks, benefits and alternatives were discussed with the patient (including " but not limited to: bleeding, infection, nerve damage, worsening of pain, inability to perform injection, paralysis, seizures, coma, no pain relief and death) who agreed to proceed.  Discussed the potential for sedation if warranted/wanted.  The procedure will plan to be performed at [facility] with fluoroscopic guidance(unless ultrasound is indicated) and could potentially have steroids and contrast dye used. Questions were answered and in a way the patient could understand.  Patient verbalized understanding and wishes to proceed.  This intervention will be ordered.  Discussed with patient that all procedures are part of a multimodal plan of care and include either formal PT or a home exercise program.  Patient has no evidence of coagulopathy or current infection.  --- OK for handicap parking permit  --- Reviewed psychological evaluation for SCS. Still not an appropriate candidate.  --- Follow-up after procedure           YUNIEL REPORT  As part of the patient's treatment plan, I am prescribing controlled substances. The patient has been made aware of appropriate use of such medications, including potential risk of somnolence, limited ability to drive and/or work safely, and the potential for dependence or overdose. It has also bee made clear that these medications are for use by this patient only, without concomitant use of alcohol or other substances unless prescribed.     Patient has completed prescribing agreement detailing terms of continued prescribing of controlled substances, including monitoring YUNIEL reports, urine drug screening, and pill counts if necessary. The patient is aware that inappropriate use will results in cessation of prescribing such medications.    As the clinician, I personally reviewed the YUNIEL from 2-1-22 while the patient was in the office today.    History and physical exam exhibit continued safe and appropriate use of controlled substances.       Dictated  utilizing Dragon dictation.     This document is intended for medical expert use only. Reading of this document by patients and/or patient's family without participating medical staff guidance may result in misinterpretation and unintended morbidity.   Any interpretation of such data is the responsibility of the patient and/or family member responsible for the patient in concert with their primary or specialist providers, not to be left for sources of online searches such as [employer], [employer] or similar queries. Relying on these approaches to knowledge may result in misinterpretation, misguided goals of care and even death should patients or family members try recommendations outside of the realm of professional medical care in a supervised way.

## 2022-03-03 ENCOUNTER — HOSPITAL ENCOUNTER (EMERGENCY)
Facility: HOSPITAL | Age: 72
Discharge: HOME | End: 2022-03-03
Attending: EMERGENCY MEDICINE | Admitting: EMERGENCY MEDICINE

## 2022-03-03 ENCOUNTER — APPOINTMENT (OUTPATIENT)
Dept: GENERAL RADIOLOGY | Facility: HOSPITAL | Age: 72
End: 2022-03-03

## 2022-03-03 VITALS
SYSTOLIC BLOOD PRESSURE: 132 MMHG | TEMPERATURE: 96.9 F | RESPIRATION RATE: 16 BRPM | OXYGEN SATURATION: 95 % | WEIGHT: 132 LBS | HEART RATE: 91 BPM | DIASTOLIC BLOOD PRESSURE: 82 MMHG | BODY MASS INDEX: 20 KG/M2 | HEIGHT: 68 IN
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
## 2022-03-03 DIAGNOSIS — S62.102A CLOSED FRACTURE OF LEFT WRIST, INITIAL ENCOUNTER: Primary | ICD-10-CM

## 2022-03-03 PROCEDURE — 73110 X-RAY EXAM OF WRIST: CPT

## 2022-03-03 PROCEDURE — 73090 X-RAY EXAM OF FOREARM: CPT

## 2022-03-03 PROCEDURE — 99283 EMERGENCY DEPT VISIT LOW MDM: CPT

## 2022-03-03 PROCEDURE — 63710000001 ONDANSETRON ODT 4 MG TABLET DISPERSIBLE: Performed by: NURSE PRACTITIONER

## 2022-03-03 RX ORDER — ONDANSETRON 4 MG/1
4 TABLET, ORALLY DISINTEGRATING ORAL ONCE
Status: COMPLETED | OUTPATIENT
Start: 2022-03-03 | End: 2022-03-03

## 2022-03-03 RX ORDER — HYDROCODONE BITARTRATE AND ACETAMINOPHEN 5; 325 MG/1; MG/1
1 TABLET ORAL EVERY 8 HOURS PRN
Qty: 9 TABLET | Refills: 0 | Status: ON HOLD | OUTPATIENT
Start: 2022-03-03 | End: 2022-09-27

## 2022-03-03 RX ORDER — HYDROCODONE BITARTRATE AND ACETAMINOPHEN 5; 325 MG/1; MG/1
1 TABLET ORAL ONCE
Status: COMPLETED | OUTPATIENT
Start: 2022-03-03 | End: 2022-03-03

## 2022-03-03 RX ADMIN — ONDANSETRON 4 MG: 4 TABLET, ORALLY DISINTEGRATING ORAL at 10:22

## 2022-03-03 RX ADMIN — HYDROCODONE BITARTRATE AND ACETAMINOPHEN 1 TABLET: 5; 325 TABLET ORAL at 10:22

## 2022-03-03 NOTE — ED NOTES
Pt from home. Mechanical fall last night getting his cat inside. Fell on left wrist. Denies hitting his head. Not on blood thinners.     Patient was placed in face mask during triage process. Patient was wearing facemask when I entered the room and throughout our encounter. I wore full protective equipment throughout this patient encounter including a face mask, eye protection, and gloves. Hand hygiene was performed before donning protective equipment and again following doffing of PPE after leaving the room.       Brooke Busch RN  03/03/22 0140
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
## 2022-03-03 NOTE — DISCHARGE INSTRUCTIONS
Home to rest  Follow up with Dr June  Wear splint  Pain medication sparingly. Caution it will make you drowsy. Do not drive on this medication.   Return if worse or new concerns   Continue care with your primary care physician and have your blood pressure regularly checked and managed. Normal blood pressure is 120/80.

## 2022-03-03 NOTE — ED PROVIDER NOTES
MD ATTESTATION NOTE    The TERRY and I have discussed this patient's history, physical exam, and treatment plan.  I have reviewed the documentation and personally had a face to face interaction with the patient. I affirm the documentation and agree with the treatment and plan.  The attached note describes my personal findings.    I provided a substantive portion of the care of this patient. I personally performed the physical exam, in its entirety.    Jose Villafuerte is a 72 y.o. male who presents to the ED c/o left wrist pain.  He reports that he tripped over his cat last night and landed on his left wrist.  He did not hit his head.  Did not lose consciousness.  He reports pain to his left wrist.  He took Tylenol this morning with some improvement.  Movement makes it worse.  Immobilization makes it better.  He denies any weakness or numbness.      On exam:  GENERAL: Awake, alert, no acute distress  SKIN: Warm, dry  HENT: Normocephalic, atraumatic  EYES: no scleral icterus  CV: regular rhythm, regular rate  RESPIRATORY: normal effort, lungs clear  ABDOMEN: soft, nontender, nondistended  MUSCULOSKELETAL: Swelling to the dorsal aspect of the left wrist.  No tenderness in the elbow or hands.  Cap refill, sensation, motor intact.  NEURO: alert, moves all extremities, follows commands    Labs  No results found for this or any previous visit (from the past 24 hour(s)).    Radiology  XR Wrist 3+ View Left, XR Forearm 2 View Left    Result Date: 3/3/2022  TWO-VIEW LEFT FOREARM AND THREE-VIEW LEFT WRIST  HISTORY: Fell. Pain.  FINDINGS: There is a fracture of the distal radial metaphysis with very slight impaction and dorsal angulation. There are mild degenerative changes at the radiocarpal joint.  This report was finalized on 3/3/2022 10:44 AM by Dr. Axel Goyal M.D.       Medical Decision Making:  ED Course as of 03/03/22 1353   Thu Mar 03, 2022   1132 YUNIEL queried and reviewed. No encounters noted.    [JS]   1134  I  Received vm from patient, admitted on Saturday to [facility].    Had thora on Friday, was having worsening chest pressure and SOB.  Called on- call coordinator over weekend who advised her to go to ED.  Rapidly re- accumulated pleural effusion noted, had another 1L thor done on Saturday.  Per outside note, one of 2 culture bottles showing strep, initiated on antbx for potential strep bacteremia.  Of note, unable to see cx results in CE, but noted in MD admission note in CE,   Blood cxs planned.    viewed xr left wrist on pacs. My findings are fracture of the distal radius, mildly impacted.  [JS]   1145 Orthoglass splint applied to left arm. Well padded with soft roll prior to application.   Splint Application:  Splint Type: sugar tong orthoglass   Indication: left wrist fracture   Splint placed by me.  Post splint application:   1) neurovascularly intact   2) good position   3) pt tolerated well.   Discussed splint care with patient  Discussed PMD/orthopedic follow up   [JS]      ED Course User Index  [JS] Gabriela Gutierrez APRN       Plan x-rays of the left wrist to rule out fracture or dislocation.  He has had no injury to his head so further imaging is unnecessary.    PPE: The patient wore a mask and I wore an N95 mask throughout the entire patient encounter.      The patient has a COVID HM Topic on their chart, and they are fully vaccinated.    Diagnosis  Final diagnoses:   Closed fracture of left wrist, initial encounter   Fall from standing, initial encounter        David Ho MD  03/03/22 2738

## 2022-03-04 ENCOUNTER — TELEPHONE (OUTPATIENT)
Dept: INTERNAL MEDICINE | Facility: CLINIC | Age: 72
End: 2022-03-04

## 2022-03-04 DIAGNOSIS — S62.102A LEFT WRIST FRACTURE, CLOSED, INITIAL ENCOUNTER: Primary | ICD-10-CM

## 2022-03-04 NOTE — TELEPHONE ENCOUNTER
Caller: Jose Villafuerte    Relationship: Self    Best call back number:     What is the medical concern/diagnosis:     What specialty or service is being requested: ORTHOPEDIC SURGEON    What is the provider, practice or medical service name:     What is the office location:     What is the office phone number:     Any additional details: PATIENT IS CALLING IN TO GET A REFERRAL FOR A ORTHOPEDIC SURGEON.  HE WANTS TO STAY IN THE [facility]

## 2022-03-04 NOTE — TELEPHONE ENCOUNTER
Patient was seen in the ER yesterday due to Closed fracture of the left wrist. He would like to see a orthopedic physician, it does not appear any referral was made by the ER for this.

## 2022-03-07 ENCOUNTER — TELEPHONE (OUTPATIENT)
Dept: PAIN MEDICINE | Facility: CLINIC | Age: 72
End: 2022-03-07

## 2022-03-07 NOTE — TELEPHONE ENCOUNTER
Provider: DR. HERNANDEZ    Caller: SELF     Relationship to Patient: SELF       Phone Number:  [phone]    Reason for Call:  PT. HAS PROCEDURE SCHEDULED WITH DR. HERNANDEZ ON 03/16/22.   PT. STATES THAT HE BROKE HIS WRIST AND WAS GIVEN PRESCRIPTION FOR HYDROCODONE #9.   PT. STATES THAT HE HAS ONLY TAKEN 3 PILLS.   HE IS ASKING IF THIS WILL AFFECT HIM GETTING THE PROCEDURE NEXT WEEK ON 03/16/22.   PLEASE CALL TO ADVISE.

## 2022-03-07 NOTE — TELEPHONE ENCOUNTER
So sorry to hear that he broke his wrist.  Pain medication will not affect anything.  The bigger concern is about giving steroid injection, and because steroids do slow down healing, this can have a negative effect on his bone healing.  I think he should hold off on the injection if at all possible.

## 2022-03-16 ENCOUNTER — APPOINTMENT (OUTPATIENT)
Dept: GENERAL RADIOLOGY | Facility: SURGERY CENTER | Age: 72
End: 2022-03-16

## 2022-04-26 ENCOUNTER — TELEPHONE (OUTPATIENT)
Dept: PAIN MEDICINE | Facility: CLINIC | Age: 72
End: 2022-04-26

## 2022-04-26 NOTE — TELEPHONE ENCOUNTER
Provider: MARY  Caller: JANICE  Phone Number: [phone]  Reason for Call:  PATIENT  IS CALLING TO GET SCHEDULE FOR HIS BACK  INJECTIONS

## 2022-04-29 ENCOUNTER — TRANSCRIBE ORDERS (OUTPATIENT)
Dept: SURGERY | Facility: SURGERY CENTER | Age: 72
End: 2022-04-29

## 2022-04-29 DIAGNOSIS — Z41.9 SURGERY, ELECTIVE: Primary | ICD-10-CM

## 2022-05-25 ENCOUNTER — HOSPITAL ENCOUNTER (OUTPATIENT)
Dept: GENERAL RADIOLOGY | Facility: SURGERY CENTER | Age: 72
Setting detail: HOSPITAL OUTPATIENT SURGERY
End: 2022-05-25

## 2022-05-25 ENCOUNTER — HOSPITAL ENCOUNTER (OUTPATIENT)
Facility: SURGERY CENTER | Age: 72
Setting detail: HOSPITAL OUTPATIENT SURGERY
Discharge: HOME | End: 2022-05-25
Attending: ANESTHESIOLOGY | Admitting: ANESTHESIOLOGY

## 2022-05-25 VITALS
TEMPERATURE: 98 F | BODY MASS INDEX: 21.97 KG/M2 | HEIGHT: 67 IN | RESPIRATION RATE: 18 BRPM | WEIGHT: 140 LBS | OXYGEN SATURATION: 93 % | SYSTOLIC BLOOD PRESSURE: 114 MMHG | DIASTOLIC BLOOD PRESSURE: 66 MMHG | HEART RATE: 77 BPM

## 2022-05-25 DIAGNOSIS — Z41.9 SURGERY, ELECTIVE: ICD-10-CM

## 2022-05-25 DIAGNOSIS — M54.16 LUMBAR RADICULOPATHY: ICD-10-CM

## 2022-05-25 DIAGNOSIS — M48.062 SPINAL STENOSIS OF LUMBAR REGION WITH NEUROGENIC CLAUDICATION: ICD-10-CM

## 2022-05-25 PROCEDURE — 25010000002 FENTANYL CITRATE (PF) 50 MCG/ML SOLUTION: Performed by: ANESTHESIOLOGY

## 2022-05-25 PROCEDURE — 64483 NJX AA&/STRD TFRM EPI L/S 1: CPT | Performed by: ANESTHESIOLOGY

## 2022-05-25 PROCEDURE — 0 IOHEXOL 300 MG/ML SOLUTION 10 ML VIAL: Performed by: ANESTHESIOLOGY

## 2022-05-25 PROCEDURE — 64484 NJX AA&/STRD TFRM EPI L/S EA: CPT | Performed by: ANESTHESIOLOGY

## 2022-05-25 PROCEDURE — S0260 H&P FOR SURGERY: HCPCS | Performed by: ANESTHESIOLOGY

## 2022-05-25 PROCEDURE — 77002 NEEDLE LOCALIZATION BY XRAY: CPT

## 2022-05-25 PROCEDURE — 76000 FLUOROSCOPY <1 HR PHYS/QHP: CPT

## 2022-05-25 PROCEDURE — 25010000002 MIDAZOLAM PER 1 MG: Performed by: ANESTHESIOLOGY

## 2022-05-25 PROCEDURE — 25010000002 METHYLPREDNISOLONE PER 80 MG: Performed by: ANESTHESIOLOGY

## 2022-05-25 RX ORDER — SODIUM CHLORIDE 0.9 % (FLUSH) 0.9 %
10 SYRINGE (ML) INJECTION AS NEEDED
Status: DISCONTINUED | OUTPATIENT
Start: 2022-05-25 | End: 2022-05-25 | Stop reason: HOSPADM

## 2022-05-25 RX ORDER — SODIUM CHLORIDE 0.9 % (FLUSH) 0.9 %
10 SYRINGE (ML) INJECTION EVERY 12 HOURS SCHEDULED
Status: DISCONTINUED | OUTPATIENT
Start: 2022-05-25 | End: 2022-05-25 | Stop reason: HOSPADM

## 2022-05-25 RX ORDER — BUPIVACAINE HYDROCHLORIDE 7.5 MG/ML
INJECTION, SOLUTION EPIDURAL; RETROBULBAR AS NEEDED
Status: DISCONTINUED | OUTPATIENT
Start: 2022-05-25 | End: 2022-05-25 | Stop reason: HOSPADM

## 2022-05-25 RX ORDER — MIDAZOLAM HYDROCHLORIDE 1 MG/ML
INJECTION INTRAMUSCULAR; INTRAVENOUS AS NEEDED
Status: DISCONTINUED | OUTPATIENT
Start: 2022-05-25 | End: 2022-05-25 | Stop reason: HOSPADM

## 2022-05-25 RX ORDER — FENTANYL CITRATE 50 UG/ML
INJECTION, SOLUTION INTRAMUSCULAR; INTRAVENOUS AS NEEDED
Status: DISCONTINUED | OUTPATIENT
Start: 2022-05-25 | End: 2022-05-25 | Stop reason: HOSPADM

## 2022-06-01 DIAGNOSIS — I10 ESSENTIAL HYPERTENSION: ICD-10-CM

## 2022-06-02 RX ORDER — AMLODIPINE BESYLATE 10 MG/1
TABLET ORAL
Qty: 90 TABLET | Refills: 3 | Status: SHIPPED | OUTPATIENT
Start: 2022-06-02 | End: 2022-10-28 | Stop reason: SDUPTHER

## 2022-06-02 RX ORDER — LISINOPRIL 40 MG/1
TABLET ORAL
Qty: 90 TABLET | Refills: 3 | Status: SHIPPED | OUTPATIENT
Start: 2022-06-02 | End: 2022-10-28 | Stop reason: SDUPTHER

## 2022-06-08 ENCOUNTER — TELEPHONE (OUTPATIENT)
Dept: PAIN MEDICINE | Facility: CLINIC | Age: 72
End: 2022-06-08

## 2022-06-08 NOTE — TELEPHONE ENCOUNTER
Caller: Jose Villafuerte    Relationship to patient: Self    Best call back number:     Chief complaint: BACK PAIN    Type of visit: FUP     Additional notes:PATIENT IS CALLING TO SEE WHEN HE NEEDS TO BE SCHEDULED FOR FOLLOW UP AFTER EPIDURAL INJECTION ON 5/25/22.  HE IS TRYING TO GET FORM TAKEN CARE OF FOR PARKING STICKER

## 2022-06-27 ENCOUNTER — PREP FOR SURGERY (OUTPATIENT)
Dept: SURGERY | Facility: SURGERY CENTER | Age: 72
End: 2022-06-27

## 2022-06-27 ENCOUNTER — OFFICE VISIT (OUTPATIENT)
Dept: PAIN MEDICINE | Facility: CLINIC | Age: 72
End: 2022-06-27

## 2022-06-27 VITALS
DIASTOLIC BLOOD PRESSURE: 80 MMHG | SYSTOLIC BLOOD PRESSURE: 149 MMHG | HEART RATE: 88 BPM | RESPIRATION RATE: 20 BRPM | OXYGEN SATURATION: 97 % | HEIGHT: 67 IN | WEIGHT: 132 LBS | TEMPERATURE: 96.9 F | BODY MASS INDEX: 20.72 KG/M2

## 2022-06-27 DIAGNOSIS — M54.16 LUMBAR RADICULOPATHY: ICD-10-CM

## 2022-06-27 DIAGNOSIS — M79.18 PIRIFORMIS MUSCLE PAIN: Primary | ICD-10-CM

## 2022-06-27 DIAGNOSIS — G89.29 OTHER CHRONIC PAIN: Primary | ICD-10-CM

## 2022-06-27 DIAGNOSIS — M48.062 SPINAL STENOSIS OF LUMBAR REGION WITH NEUROGENIC CLAUDICATION: ICD-10-CM

## 2022-06-27 DIAGNOSIS — M79.18 PIRIFORMIS MUSCLE PAIN: ICD-10-CM

## 2022-06-27 PROCEDURE — 99214 OFFICE O/P EST MOD 30 MIN: CPT | Performed by: NURSE PRACTITIONER

## 2022-06-27 RX ORDER — SODIUM CHLORIDE 0.9 % (FLUSH) 0.9 %
10 SYRINGE (ML) INJECTION AS NEEDED
Status: CANCELLED | OUTPATIENT
Start: 2022-06-27

## 2022-06-27 RX ORDER — SODIUM CHLORIDE 0.9 % (FLUSH) 0.9 %
10 SYRINGE (ML) INJECTION EVERY 12 HOURS SCHEDULED
Status: CANCELLED | OUTPATIENT
Start: 2022-06-27

## 2022-06-27 NOTE — PROGRESS NOTES
CHIEF COMPLAINT  F/u back pain. Pt had transforaminal epidural steroid injection left lumbar2 and lumbar 5 and sts receiving no relief from procedure.     Subjective   Jose Villafuerte is a 72 y.o. male  who presents for follow-up.  He has a history of chronic low back and left leg pain. Reports his pain is worse since last evaluation.    Patient presents for follow-up of PROCEDURE. Patient had a TF TOM Left L2 and l5 performed by Dr. HERNANDEZ on 5-25-22 for management of LOW BACK AND LEFT LEG PAIN. Patient reports minimal relief from the procedure. Reports very minimal relief for short-term.    Complains of pain in his left low back, left buttock and left leg/hip. Today his pain is 6/10VAS.  Describes the pain as continuous aching with intermittent sharp pain. Pain increases with prolonged position, bending/lifting/twisting; pain decreases with laying down, changing position.  NO relief with Tylenol or ibuprofen. ADL's by self. Denies any bowel or bladder incontinence.     Previously, we discussed consideration for SCS.  Had completed  BS SCS education session.  Sent for psychological evaluation. He did not have a good outcome on the psychological evaluation. Thus we did not move forward with this.     He drinks approximately 12 beers/day.  He is not interested in discontinuing alcohol.  He has cut down 8 beers/day.     Saw Dr Mackey. Plan was for surgery but that was cancelled(May 2019). Referred to pain management by Dr Hinton. Has not seen recently.      Dr Oviedo referred pt to Dr. Hinton for orthopedic surgery. Pt went to [facility] on 5/3/19 to have Left L5-S1 laminectomy, foraminotomies and medial facetectomy with metrx with Dr. Mackey, but surgery was cancelled due to pt hx of alcohol use. Dr. Mackey declined operating. Pt sts he currently drinks 12 cans of beer daily throughout the day. Pt sts he also smokes marijuana once a month to control pain.     Patient remained masked during entire encounter.  No cough present. I donned a mask and eye protection throughout entire visit. Prior to donning mask and eye protection, hand hygiene was performed, as well as when it was doffed.  I was closer than 6 feet, but not for an extended period of time. No obvious exposure to any bodily fluids.    History of Present Illness     Procedure List  -- 5-25-22-- TF TOM Left L2 and L5  --12-21-21-- TF TOM Left L2 and L5  -- 9-16-21-- TF TOM left L2 and L5  -- 6-15-21-- TF TOM Left L2 and L5  -- 4-28-21-- left piriformis  -- 3-3-21--- TF TOM left L2 and L5-- Patient reports 80% relief from the procedure for 2 weeks. 40% relief for several weeks  -- 12-17-20-- left piriformis  -- 9-15-20-- TF TOM left L5 and left piriformis  -- 8-13-20--  TF TOM left L5  -- 6-10-20-- left piriformis  -- 3-3-30-- TF TOM left L5  -- 2-4-20-- TF TOM right L2 and L5  -- 12-8-19-- LEFT SI-- 20% relief  -- 11-13-19-- left piriformis  -- 10-10-19-- left piriformis    PEG Assessment   What number best describes your pain on average in the past week?6  What number best describes how, during the past week, pain has interfered with your enjoyment of life?6  What number best describes how, during the past week, pain has interfered with your general activity?  6    The following portions of the patient's history were reviewed and updated as appropriate: allergies, current medications, past family history, past medical history, past social history, past surgical history and problem list.    Review of Systems   Constitutional: Negative for activity change, fatigue and fever.   HENT: Negative for congestion.    Eyes: Negative for visual disturbance.   Respiratory: Negative for cough and shortness of breath.    Cardiovascular: Negative for chest pain.   Gastrointestinal: Negative for constipation and diarrhea.   Genitourinary: Negative for difficulty urinating.   Musculoskeletal: Positive for back pain.   Neurological: Negative for dizziness, weakness,  "light-headedness and numbness.   Psychiatric/Behavioral: Negative for agitation, sleep disturbance and suicidal ideas. The patient is not nervous/anxious.      I have reviewed and confirmed the accuracy of the ROS as documented by the MA/LPN/RN JENNIFER Perez      Vitals:    06/27/22 1041   BP: 149/80   Pulse: 88   Resp: 20   Temp: 96.9 °F (36.1 °C)   SpO2: 97%   Weight: 59.9 kg (132 lb)   Height: 170.2 cm (67\")   PainSc:   6   PainLoc: Back     Objective   Physical Exam  Vitals and nursing note reviewed.   Constitutional:       Appearance: Normal appearance. He is well-developed.   HENT:      Head: Normocephalic and atraumatic.   Eyes:      General: Lids are normal.   Musculoskeletal:      Lumbar back: Tenderness present. Decreased range of motion.        Legs:    Skin:     General: Skin is warm and dry.   Neurological:      Mental Status: He is alert and oriented to person, place, and time.      Gait: Gait abnormal.   Psychiatric:         Speech: Speech normal.         Behavior: Behavior normal. Behavior is cooperative.         Thought Content: Thought content normal.         Judgment: Judgment normal.         Assessment & Plan   Diagnoses and all orders for this visit:    1. Other chronic pain (Primary)    2. Spinal stenosis of lumbar region with neurogenic claudication  -     MRI Lumbar Spine Without Contrast; Future    3. Lumbar radiculopathy  -     MRI Lumbar Spine Without Contrast; Future    4. Piriformis muscle pain      --- left piriformis injection. Reviewed the procedure at length with the patient.  Included in the review was expectations, complications, risk and benefits.The procedure was described in detail and the risks, benefits and alternatives were discussed with the patient (including but not limited to: bleeding, infection, nerve damage, worsening of pain, inability to perform injection, paralysis, seizures, coma, no pain relief and death) who agreed to proceed.  Discussed the potential " for sedation if warranted/wanted.  The procedure will plan to be performed at [facility] with fluoroscopic guidance(unless ultrasound is indicated) and could potentially have steroids and contrast dye used. Questions were answered and in a way the patient could understand.  Patient verbalized understanding and wishes to proceed.  This intervention will be ordered.  Discussed with patient that all procedures are part of a multimodal plan of care and include either formal PT or a home exercise program.  Patient has no evidence of coagulopathy or current infection.  --- MRI-Lumbar.  --- Congratulated on efforts to decrease alcohol use  --- Follow-up after procedure or sooner if needed       YUNIEL REPORT      As the clinician, I personally reviewed the YUNIEL from 6-27-22 while the patient was in the office today.         Dictated utilizing Dragon dictation.     This document is intended for medical expert use only. Reading of this document by patients and/or patient's family without participating medical staff guidance may result in misinterpretation and unintended morbidity.   Any interpretation of such data is the responsibility of the patient and/or family member responsible for the patient in concert with their primary or specialist providers, not to be left for sources of online searches such as [employer], [place] ([employer]) or similar queries. Relying on these approaches to knowledge may result in misinterpretation, misguided goals of care and even death should patients or family members try recommendations outside of the realm of professional medical care in a supervised way.

## 2022-06-28 ENCOUNTER — TRANSCRIBE ORDERS (OUTPATIENT)
Dept: SURGERY | Facility: SURGERY CENTER | Age: 72
End: 2022-06-28

## 2022-06-28 DIAGNOSIS — Z41.9 SURGERY, ELECTIVE: Primary | ICD-10-CM

## 2022-08-07 ENCOUNTER — HOSPITAL ENCOUNTER (OUTPATIENT)
Dept: MRI IMAGING | Facility: HOSPITAL | Age: 72
Discharge: HOME | End: 2022-08-07
Admitting: NURSE PRACTITIONER

## 2022-08-07 DIAGNOSIS — M54.16 LUMBAR RADICULOPATHY: ICD-10-CM

## 2022-08-07 DIAGNOSIS — M48.062 SPINAL STENOSIS OF LUMBAR REGION WITH NEUROGENIC CLAUDICATION: ICD-10-CM

## 2022-08-07 PROCEDURE — 72148 MRI LUMBAR SPINE W/O DYE: CPT

## 2022-09-20 NOTE — SIGNIFICANT NOTE
Patient educated on the following :    - If you are receiving Sedation for your procedure Nothing to Eat 6 hours and only clear liquids for 2 hours prior to your procedure.    -Your required COVID Test is Scheduled on  Between the Hours of 0140-2525  -You will only be notified if your COVID test Result is POSITIVE  -The importance of reducing your number of contacts by self quarantining after you COVID test until the date of your PROCEDURE  -You will need to have someone drive you home after your PROCEDURE and remain with you for 24 hours after the PROCEDURE  - The date of your procedure, your are welcome to have one visitor at bedside or remain within 10-15 minutes of [employer]  -You will need to arrive at 10 on 12/21 PROCEDURE  -Please contact [employer] [facility] at: [phone] with any questions and/or concerns    Mastoid Interpolation Flap Division And Inset Text: Division and inset of the mastoid interpolation flap was performed to achieve optimal aesthetic result, restore normal anatomic appearance and avoid distortion of normal anatomy, expedite and facilitate wound healing, achieve optimal functional result and because linear closure either not possible or would produce suboptimal result. The patient was prepped and draped in the usual manner. The pedicle was infiltrated with local anesthesia. The pedicle was sectioned with a #15 blade. The pedicle was de-bulked and trimmed to match the shape of the defect. Hemostasis was achieved. The flap donor site and free margin of the flap were secured with deep buried sutures and the wound edges were re-approximated.

## 2022-09-27 ENCOUNTER — HOSPITAL ENCOUNTER (OUTPATIENT)
Facility: SURGERY CENTER | Age: 72
Setting detail: HOSPITAL OUTPATIENT SURGERY
Discharge: HOME | End: 2022-09-27
Attending: ANESTHESIOLOGY | Admitting: ANESTHESIOLOGY

## 2022-09-27 ENCOUNTER — HOSPITAL ENCOUNTER (OUTPATIENT)
Dept: GENERAL RADIOLOGY | Facility: SURGERY CENTER | Age: 72
Setting detail: HOSPITAL OUTPATIENT SURGERY
End: 2022-09-27

## 2022-09-27 VITALS
DIASTOLIC BLOOD PRESSURE: 68 MMHG | HEART RATE: 72 BPM | HEIGHT: 67 IN | OXYGEN SATURATION: 94 % | BODY MASS INDEX: 21.97 KG/M2 | WEIGHT: 140 LBS | SYSTOLIC BLOOD PRESSURE: 137 MMHG | RESPIRATION RATE: 16 BRPM | TEMPERATURE: 95.6 F

## 2022-09-27 DIAGNOSIS — M79.18 PIRIFORMIS MUSCLE PAIN: ICD-10-CM

## 2022-09-27 DIAGNOSIS — Z41.9 SURGERY, ELECTIVE: ICD-10-CM

## 2022-09-27 PROCEDURE — 25010000002 FENTANYL CITRATE (PF) 50 MCG/ML SOLUTION: Performed by: ANESTHESIOLOGY

## 2022-09-27 PROCEDURE — 20552 NJX 1/MLT TRIGGER POINT 1/2: CPT | Performed by: ANESTHESIOLOGY

## 2022-09-27 PROCEDURE — S0260 H&P FOR SURGERY: HCPCS | Performed by: ANESTHESIOLOGY

## 2022-09-27 PROCEDURE — 25010000002 MIDAZOLAM PER 1 MG: Performed by: ANESTHESIOLOGY

## 2022-09-27 PROCEDURE — 77002 NEEDLE LOCALIZATION BY XRAY: CPT

## 2022-09-27 PROCEDURE — 77002 NEEDLE LOCALIZATION BY XRAY: CPT | Performed by: ANESTHESIOLOGY

## 2022-09-27 PROCEDURE — 25010000002 METHYLPREDNISOLONE PER 80 MG: Performed by: ANESTHESIOLOGY

## 2022-09-27 PROCEDURE — 25010000002 IOPAMIDOL 61 % SOLUTION 30 ML VIAL: Performed by: ANESTHESIOLOGY

## 2022-09-27 RX ORDER — SODIUM CHLORIDE 0.9 % (FLUSH) 0.9 %
10 SYRINGE (ML) INJECTION AS NEEDED
Status: DISCONTINUED | OUTPATIENT
Start: 2022-09-27 | End: 2022-09-27 | Stop reason: HOSPADM

## 2022-09-27 RX ORDER — SODIUM CHLORIDE 0.9 % (FLUSH) 0.9 %
10 SYRINGE (ML) INJECTION EVERY 12 HOURS SCHEDULED
Status: DISCONTINUED | OUTPATIENT
Start: 2022-09-27 | End: 2022-09-27 | Stop reason: HOSPADM

## 2022-09-27 RX ORDER — MIDAZOLAM HYDROCHLORIDE 1 MG/ML
INJECTION INTRAMUSCULAR; INTRAVENOUS AS NEEDED
Status: DISCONTINUED | OUTPATIENT
Start: 2022-09-27 | End: 2022-09-27 | Stop reason: HOSPADM

## 2022-09-27 RX ORDER — FENTANYL CITRATE 50 UG/ML
INJECTION, SOLUTION INTRAMUSCULAR; INTRAVENOUS AS NEEDED
Status: DISCONTINUED | OUTPATIENT
Start: 2022-09-27 | End: 2022-09-27 | Stop reason: HOSPADM

## 2022-09-27 NOTE — DISCHARGE INSTRUCTIONS
[facility] - Post-procedure Instructions          --  While there are no absolute restrictions, it is recommended that you do not perform strenuous activity today. In the morning, you may resume your level of activity as before your block.    --  If you have a band-aid at your injection site, please remove it later today. Observe the area for any redness, swelling, pus-like drainage, or a temperature over 101°. If any of these symptoms occur, please call your doctor at [phone]. If after office hours, leave a message and the on-call provider will return your call.    --  Ice may be applied to your injection site. It is recommended you avoid direct heat (heating pad; hot tub) for 1-2 days.    --  Call [facility] at [phone] if you experience persistent headache, persistent bleeding from the injection site, or severe pain not relieved by heat or oral medication.    --  Do not make important decisions today.    --  Due to the effects of the block and/or the I.V. Sedation, DO NOT drive or operate hazardous machinery for 12 hours.  Local anesthetics may cause numbness after procedure and precautions must be taken with regards to operating equipment as well as with walking, even if ambulating with assistance of another person or with an assistive device.    --  Do not drink alcohol for 12 hours.    -- You may return to work tomorrow, or as directed by your referring doctor.    --  Occasionally you may notice a slight increase in your pain after the procedure. This should start to improve within the next 24-48 hours. Radiofrequency ablation procedure pain may last 3-4 weeks.    --  It may take as long as 3-4 days before you notice a gradual improvement in your pain and/or other symptoms.    -- You may continue to take your prescribed pain medication as needed.    --  Some normal possible side effects of steroid use could include fluid retention, increased blood sugar, dull headache,  increased sweating, increased appetite, mood swings and flushing.    --  Diabetics are recommended to watch their blood glucose level closely for 24-48 hours after the injection.    --  Must stay in PACU for 20 min upon arrival and prove no leg weakness before being discharged.    --  IN THE EVENT OF A LIFE THREATENING EMERGENCY, (CHEST PAIN, BREATHING DIFFICULTIES, PARALYSIS…) YOU SHOULD GO TO YOUR NEAREST EMERGENCY ROOM.    --  You should be contacted by our office within 2-3 days to schedule follow up or next appointment date.  If not contacted within 7 days, please call the office at [phone]
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
## 2022-09-27 NOTE — OP NOTE
Piriformis Injection  (unilateral)  [facility]      PREOPERATIVE DIAGNOSIS:  Piriformis syndrome, myofascial pain syndrome.    POSTOPERATIVE DIAGNOSIS:  Same as preop diagnosis    PROCEDURE:   Diagnostic Piriformis Injection at the on the left     PRE-PROCEDURE DISCUSSION WITH PATIENT:    Risks and complications were discussed with the patient prior to starting the procedure and informed consent was obtained.  We discussed various topics including but not limited to bleeding, infection, injury, nerve injury, paralysis, coma, death, postprocedural painful flare-up, postprocedural site soreness, and a lack of pain relief.  We discussed the diagnostic aspect of piriformis injection and the potential therapy of this type of a trigger point injection.    SURGEON:  Steven Corcoran MD    REASON FOR PROCEDURE:      This patient does display a positive piriformis sign on exam, and has tenderness in the area that is overlying the distribution of the piriformis on exam under x-ray    SEDATION:  Versed 2mg & Fentanyl 50 mcg IV, The use of increased procedural sedation was carefully considered, and for this particular patient the need for additional procedural sedation seemed necessary in this instance to safely perform the procedure. and The patient had higher than average levels of procedural anxiety and the need to provide additional procedural sedation was needed to safely proceed.  ANESTHETIC:  Marcaine 0.5%  STEROID:  Methylprednisolone (DEPO MEDROL) 80mg/ml    DESCRIPTON OF PROCEDURE:  After obtaining informed consent, an I.V. was started in the preoperative area. The patient taken to the operating room and was placed in the prone position with a pillow under the abdomen.  All pressure points were well padded.  EKG, blood pressure, and pulse oximeter were monitored.  The lumbar area was prepped with Chloraprep and draped in a sterile fashion.     Under fluoroscopic guidance the point just lateral to  the inferiormost point of the sacroiliac was visualized, which is also at the cephalad aspect of the sciatic notch.  Point was marked overlying the margin of the periosteum there.  This point was anesthetized with subcutaneous Lidocaine 1%, and then a spinal needle was advanced to contact this margin of the bone.  The needle was walked caudad off the bone and advanced about 1-2mm further.  Resistance was felt upon entering this muscular trigger point.  Aspiration was checked and was negative.   Next, 1 mL of Omnipaque was injected. After seeing adequate spread in the corresponding piriformis muscle, flowing toward the greater trocanter, a total volume 2.5mL of injectate containing above mentioned local anesthetic solution was injected into the trigger point.    The needle was removed intact.  Vital signs were stable throughout.        ESTIMATED BLOOD LOSS:  minimal  SPECIMENS:  none    COMPLICATIONS:   No complications were noted., There was no indication of vascular uptake on live injection of contrast dye., There was no indication of intrathecal uptake on live injection of contrast dye., There was not any evidence of dural puncture.   and The patient did not have any signs of postprocedure numbness nor weakness.    TOLERANCE & DISCHARGE CONDITION:    The patient tolerated the procedure well.  The patient was transported to the recovery area without difficulties.  The patient was discharged to home under the care of family in stable and satisfactory condition.    PLAN OF CARE:  1. The patient was given our standard instruction sheet.  2. The patient will Return to clinic 4 wks.  3. The patient will resume all medications as per the medication reconciliation sheet.

## 2022-10-25 ENCOUNTER — OFFICE VISIT (OUTPATIENT)
Dept: PAIN MEDICINE | Facility: CLINIC | Age: 72
End: 2022-10-25

## 2022-10-25 ENCOUNTER — PREP FOR SURGERY (OUTPATIENT)
Dept: SURGERY | Facility: SURGERY CENTER | Age: 72
End: 2022-10-25

## 2022-10-25 VITALS
DIASTOLIC BLOOD PRESSURE: 67 MMHG | BODY MASS INDEX: 21.25 KG/M2 | OXYGEN SATURATION: 97 % | WEIGHT: 135.4 LBS | HEART RATE: 101 BPM | HEIGHT: 67 IN | SYSTOLIC BLOOD PRESSURE: 139 MMHG | TEMPERATURE: 99.1 F

## 2022-10-25 DIAGNOSIS — M46.1 SACROILIITIS: Primary | ICD-10-CM

## 2022-10-25 DIAGNOSIS — M79.18 PIRIFORMIS MUSCLE PAIN: ICD-10-CM

## 2022-10-25 DIAGNOSIS — G89.29 OTHER CHRONIC PAIN: ICD-10-CM

## 2022-10-25 DIAGNOSIS — F10.20 ALCOHOLISM: ICD-10-CM

## 2022-10-25 PROCEDURE — 99214 OFFICE O/P EST MOD 30 MIN: CPT | Performed by: PHYSICIAN ASSISTANT

## 2022-10-25 RX ORDER — SODIUM CHLORIDE 0.9 % (FLUSH) 0.9 %
10 SYRINGE (ML) INJECTION AS NEEDED
Status: CANCELLED | OUTPATIENT
Start: 2022-10-25

## 2022-10-25 RX ORDER — SODIUM CHLORIDE 0.9 % (FLUSH) 0.9 %
10 SYRINGE (ML) INJECTION EVERY 12 HOURS SCHEDULED
Status: CANCELLED | OUTPATIENT
Start: 2022-10-25

## 2022-10-25 NOTE — PROGRESS NOTES
CHIEF COMPLAINT  F/U back pain- PIRIFORMIS INJECTION-- left- patient states that he had 50% improvement for 4 weeks.       Subjective   Jose Villafuerte is a 72 y.o. male  who presents to the office for follow-up of procedure.  He completed a left piriformis injection   on 9/27/2022 performed by Dr. Corcoran for management of piriformis muscle pain. Patient reports 50% relief from the procedure x4-weeks.  The patient has noted progressive worsening pain over the last several weeks predominantly affecting the left posterior buttock and piriformis muscle which is aggravated by prolonged sitting.    The patient has noted some declining efficacy of relief following lumbar TFESI's.    He has been evaluated by Dr Mackey and Dr. Hinton who have both declined proceeding with operative intervention secondary to patient's history of alcohol abuse.  He is a candidate to proceed with left L5-S1 laminectomy, foraminotomies and medial facetectomy with Metrix.    Patient states that he continues to drink approximately 8-12 cans of beer daily and smokes marijuana once a month to control his pain.    Procedure List  --9/27/2022--left piriformis injection 50% pain relief x4 weeks  -- 5-25-22-- TF TOM Left L2 and L5  --12-21-21-- TF TOM Left L2 and L5  -- 9-16-21-- TF TOM left L2 and L5  -- 6-15-21-- TF TOM Left L2 and L5  -- 4-28-21-- left piriformis  -- 3-3-21--- TF TOM left L2 and L5-- Patient reports 80% relief from the procedure for 2 weeks. 40% relief for several weeks  -- 12-17-20-- left piriformis  -- 9-15-20-- TF TOM left L5 and left piriformis    Pain today 8/10 VAS in severity.    Back Pain  This is a chronic problem. The current episode started more than 1 year ago. The problem occurs constantly. The problem has been gradually worsening since onset. The pain is present in the lumbar spine, gluteal and sacro-iliac. The quality of the pain is described as aching and burning. The pain radiates to the left thigh. The pain is at  a severity of 8/10. The pain is moderate. The pain is the same all the time. The symptoms are aggravated by sitting, standing and position. Associated symptoms include weakness. Pertinent negatives include no abdominal pain, chest pain, dysuria, fever, headaches or numbness.        PEG Assessment   What number best describes your pain on average in the past week?8  What number best describes how, during the past week, pain has interfered with your enjoyment of life?8  What number best describes how, during the past week, pain has interfered with your general activity?  8    Review of Pertinent Medical Data ---    MRI OF THE LUMBAR SPINE WITHOUT CONTRAST     CLINICAL HISTORY: Low back pain. Neurogenic claudication.     TECHNIQUE: MRI of the lumbar spine was obtained with sagittal T1,  proton-density, and T2-weighted images. Additionally, there are axial T1  and T2-weighted images through the lumbar spine.     COMPARISON: Comparison is made to previous MRI lumbar of the spine dated  02/04/2021.     FINDINGS:     The conus medullaris terminates at the level of the L1-2 interspace and  has normal signal intensity. The visualized distal thoracic spinal cord  is unremarkable in appearance.     Again noted is a mild chronic compression deformity at the L1 level with  approximately 30-35% loss of vertebral body height within the maximally  compressed portion of the vertebra excluding the Schmorl's node within  the superior endplate. When compared to the prior study, there is no  significant interval change in the degree of vertebral body height loss.     At L1-2 and L2-3, there is no significant degree of canal or foraminal  narrowing.     At L3-4, there is minimal bulging disc material. There is a posterior  annular fissure. There is degenerative retrolisthesis of L3 on L4 by  approximately 1 to 2 mm. There is minimal canal and foraminal narrowing  secondary to bulging disc material. No significant interval change is  seen  in these findings when compared to the prior study. There is mild  degenerative endplate marrow edema within the anterior and superior  endplate of L4 which is more pronounced when compared to the prior  study.     At L4-5, there is bulging disc material with a posterior annular  fissure. Mild facet arthropathy is seen. Minimal left foraminal  narrowing is seen secondary to bulging disc material. There is no  significant degree of canal narrowing. Similar findings were noted on  the prior exam.     At L5-S1, mild facet arthropathy is seen. Degenerative retrolisthesis of  L5 on S1 by approximately 3 mm is identified. A disc osteophyte complex  results in a mild-to-moderate degree of right and a mild degree of left  foraminal narrowing. The disc osteophyte complex also minimally indents  the ventral subarachnoid space and abuts the descending right S1 nerve  root sleeve just as it exits the thecal sac. No significant interval  change is seen when compared to the prior exam.     IMPRESSION:     1. Stable findings when compared to the prior study dated 02/04/2021.     2. There is a mild chronic compression deformity at the L1 level with  approximately 30-35% loss of vertebral body height within the maximally  compressed portion of the vertebra.     3. Multilevel relatively mild foraminal narrowing and minimal canal  narrowing are as discussed in detail above. The most prominent foraminal  stenosis is identified within the right L5-S1 neural foramen where there  is a mild-to-moderate degree of foraminal stenosis secondary to a disc  osteophyte complex.     4. The remaining multilevel degenerative changes are as discussed in  detail above.     This report was finalized on 8/9/2022 7:00 AM by Dr. Zach Singh M.D.    The following portions of the patient's history were reviewed and updated as appropriate: allergies, current medications, past family history, past medical history, past social history, past surgical history  "and problem list.    Review of Systems   Constitutional: Positive for activity change (decreased). Negative for chills, fatigue and fever.   HENT: Negative for congestion.    Eyes: Negative for visual disturbance.   Respiratory: Negative for chest tightness and shortness of breath.    Cardiovascular: Negative for chest pain.   Gastrointestinal: Negative for abdominal pain, constipation and diarrhea.   Genitourinary: Negative for difficulty urinating and dysuria.   Musculoskeletal: Positive for back pain.   Neurological: Positive for dizziness and weakness. Negative for light-headedness, numbness and headaches.   Psychiatric/Behavioral: Negative for agitation and sleep disturbance. The patient is not nervous/anxious.      I have reviewed and confirmed the accuracy of the ROS as documented by the MA/LPN/RN SUKHI Stephens     Vitals:    10/25/22 1036   BP: 139/67   Pulse: 101   Temp: 99.1 °F (37.3 °C)   SpO2: 97%   Weight: 61.4 kg (135 lb 6.4 oz)   Height: 170.2 cm (67\")   PainSc:   8   PainLoc: Hip  Comment: right       Objective   Physical Exam  Vitals and nursing note reviewed.   Constitutional:       Appearance: Normal appearance.   HENT:      Head: Normocephalic.   Pulmonary:      Effort: Pulmonary effort is normal.   Musculoskeletal:      Lumbar back: Spasms and tenderness (+PATRICKS/+SI COMPRESSION/+SI THRUST; TENDERNESS TO PALPATION OVER THE LEFT SI JOINT SPACE) present. Decreased range of motion.   Skin:     General: Skin is warm and dry.   Neurological:      General: No focal deficit present.      Mental Status: He is alert and oriented to person, place, and time.      Cranial Nerves: Cranial nerves 2-12 are intact.      Sensory: Sensation is intact.      Motor: Motor function is intact.      Gait: Gait abnormal.   Psychiatric:         Mood and Affect: Mood normal.         Behavior: Behavior normal.         Thought Content: Thought content normal.         Judgment: Judgment normal.         Assessment & " Plan   Diagnoses and all orders for this visit:    1. Sacroiliitis (HCC) (Primary)    2. Piriformis muscle pain    3. Other chronic pain    4. Alcoholism (HCC)        --- I will have the patient return for #1 diagnostic left SIJ injection with piriformis muscle injection.  --- RTC 2-3 months for further evaluation and treatment recommendations to be made        Dictated utilizing Dragon dictation.      This document is intended for medical expert use only. Reading of this document by patients and/or patient's family without participating medical staff guidance may result in misinterpretation and unintended morbidity.   Any interpretation of such data is the responsibility of the patient and/or family member responsible for the patient in concert with their primary or specialist providers, not to be left for sources of online searches such as [employer], [employer] or similar queries. Relying on these approaches to knowledge may result in misinterpretation, misguided goals of care and even death should patients or family members try recommendations outside of the realm of professional medical care in a supervised way.    Patient remained masked during entire encounter. No cough present. I donned a mask and eye protection throughout entire visit. Prior to donning mask and eye protection, hand hygiene was performed, as well as when it was doffed.  I was closer than 6 feet, but not for an extended period of time. No obvious exposure to any bodily fluids.

## 2022-10-28 ENCOUNTER — OFFICE VISIT (OUTPATIENT)
Dept: INTERNAL MEDICINE | Facility: CLINIC | Age: 72
End: 2022-10-28

## 2022-10-28 VITALS
HEIGHT: 67 IN | WEIGHT: 136 LBS | SYSTOLIC BLOOD PRESSURE: 130 MMHG | DIASTOLIC BLOOD PRESSURE: 70 MMHG | BODY MASS INDEX: 21.35 KG/M2 | TEMPERATURE: 98.7 F | OXYGEN SATURATION: 98 % | HEART RATE: 81 BPM | RESPIRATION RATE: 12 BRPM

## 2022-10-28 DIAGNOSIS — Z12.5 SCREENING FOR PROSTATE CANCER: ICD-10-CM

## 2022-10-28 DIAGNOSIS — I10 ESSENTIAL HYPERTENSION: ICD-10-CM

## 2022-10-28 DIAGNOSIS — E78.5 DYSLIPIDEMIA: ICD-10-CM

## 2022-10-28 DIAGNOSIS — Z00.00 INITIAL MEDICARE ANNUAL WELLNESS VISIT: Primary | ICD-10-CM

## 2022-10-28 DIAGNOSIS — F10.20 UNCOMPLICATED ALCOHOL DEPENDENCE: Chronic | ICD-10-CM

## 2022-10-28 DIAGNOSIS — F17.219 CIGARETTE NICOTINE DEPENDENCE WITH NICOTINE-INDUCED DISORDER: ICD-10-CM

## 2022-10-28 DIAGNOSIS — Z23 NEED FOR INFLUENZA VACCINATION: ICD-10-CM

## 2022-10-28 DIAGNOSIS — R91.8 PULMONARY NODULES: ICD-10-CM

## 2022-10-28 PROCEDURE — G0008 ADMIN INFLUENZA VIRUS VAC: HCPCS | Performed by: FAMILY MEDICINE

## 2022-10-28 PROCEDURE — 90662 IIV NO PRSV INCREASED AG IM: CPT | Performed by: FAMILY MEDICINE

## 2022-10-28 PROCEDURE — 1125F AMNT PAIN NOTED PAIN PRSNT: CPT | Performed by: FAMILY MEDICINE

## 2022-10-28 PROCEDURE — 1170F FXNL STATUS ASSESSED: CPT | Performed by: FAMILY MEDICINE

## 2022-10-28 PROCEDURE — 1159F MED LIST DOCD IN RCRD: CPT | Performed by: FAMILY MEDICINE

## 2022-10-28 PROCEDURE — G0439 PPPS, SUBSEQ VISIT: HCPCS | Performed by: FAMILY MEDICINE

## 2022-10-28 RX ORDER — AMLODIPINE BESYLATE 10 MG/1
10 TABLET ORAL DAILY
Qty: 90 TABLET | Refills: 4 | Status: SHIPPED | OUTPATIENT
Start: 2022-10-28

## 2022-10-28 RX ORDER — LISINOPRIL 40 MG/1
40 TABLET ORAL DAILY
Qty: 90 TABLET | Refills: 4 | Status: SHIPPED | OUTPATIENT
Start: 2022-10-28

## 2022-10-28 NOTE — PROGRESS NOTES
The ABCs of the Annual Wellness Visit  Initial Medicare Wellness Visit    Chief Complaint   Patient presents with   • Medicare Wellness-Initial Visit      Subjective    History of Present Illness:  Jose Villafuerte is a 72 y.o. male who presents for a Subsequent Medicare Wellness Visit.    The following portions of the patient's history were reviewed and   updated as appropriate: allergies, current medications, past family history, past medical history, past social history, past surgical history and problem list.    Compared to one year ago, the patient feels his physical   health is the same.    Compared to one year ago, the patient feels his mental   health is the same.    Recent Hospitalizations:  He was not admitted to the hospital during the last year.       Current Medical Providers:  Patient Care Team:  John Etienne MD as PCP - General (Family Medicine)    Outpatient Medications Prior to Visit   Medication Sig Dispense Refill   • B Complex Vitamins (B COMPLEX 1 PO) Take 1 tablet by mouth. TAKES EVERY OTHER DAY     • Fexofenadine HCl (ALLERGY 24-HR PO) Take  by mouth.     • amLODIPine (NORVASC) 10 MG tablet TAKE ONE TABLET BY MOUTH DAILY 90 tablet 3   • lisinopril (PRINIVIL,ZESTRIL) 40 MG tablet TAKE ONE TABLET BY MOUTH DAILY 90 tablet 3     No facility-administered medications prior to visit.       No opioid medication identified on active medication list. I have reviewed chart for other potential  high risk medication/s and harmful drug interactions in the elderly.          Aspirin is not on active medication list.  Aspirin use is not indicated based on review of current medical condition/s. Risk of harm outweighs potential benefits.  .    Patient Active Problem List   Diagnosis   • Uncomplicated alcohol dependence (HCC)   • Nicotine dependence   • Spinal stenosis of lumbar region with neurogenic claudication   • Essential hypertension   • Annual physical exam   • Other chronic pain   • Piriformis  "muscle pain   • Panlobular emphysema (HCC)   • Lumbar radiculopathy   • Encounter for screening for malignant neoplasm of colon   • Back pain   • Sacroiliitis (HCC)   • Pulmonary nodules     Advance Care Planning  Advance Directive is on file.  ACP discussion was held with the patient during this visit. Patient has an advance directive in EMR which is still valid.           Objective    Vitals:    10/28/22 1037 10/28/22 1043   BP:  130/70   BP Location:  Left arm   Patient Position:  Sitting   Cuff Size:  Adult   Pulse:  81   Resp:  12   Temp:  98.7 °F (37.1 °C)   SpO2:  98%   Weight:  61.7 kg (136 lb)   Height: 170.2 cm (67\") 170.2 cm (67\")   PainSc:    8     Estimated body mass index is 21.3 kg/m² as calculated from the following:    Height as of this encounter: 170.2 cm (67\").    Weight as of this encounter: 61.7 kg (136 lb).    BMI is within normal parameters. No other follow-up for BMI required.      Does the patient have evidence of cognitive impairment? No    Physical Exam  Vitals and nursing note reviewed.   Constitutional:       General: He is not in acute distress.     Appearance: Normal appearance.   Cardiovascular:      Rate and Rhythm: Normal rate and regular rhythm.      Heart sounds: Normal heart sounds. No murmur heard.  Pulmonary:      Effort: Pulmonary effort is normal.      Breath sounds: Normal breath sounds.   Neurological:      Mental Status: He is alert.                 HEALTH RISK ASSESSMENT    Smoking Status:  Social History     Tobacco Use   Smoking Status Every Day   • Packs/day: 2.00   • Years: 45.00   • Pack years: 90.00   • Types: Cigarettes   • Last attempt to quit: 2020   • Years since quittin.2   Smokeless Tobacco Never     Alcohol Consumption:  Social History     Substance and Sexual Activity   Alcohol Use Yes   • Alcohol/week: 56.0 standard drinks   • Types: 56 Cans of beer per week    Comment: Daily-9 BEERS     Fall Risk Screen:    SD Fall Risk Assessment was " completed, and patient is at LOW risk for falls.Assessment completed on:10/28/2022    Depression Screening:  PHQ-2/PHQ-9 Depression Screening 10/28/2022   Retired Total Score -   Little Interest or Pleasure in Doing Things 0-->not at all   Feeling Down, Depressed or Hopeless 0-->not at all   PHQ-9: Brief Depression Severity Measure Score 0       Health Habits and Functional and Cognitive Screening:  Functional & Cognitive Status 10/28/2022   Do you have difficulty preparing food and eating? No   Do you have difficulty bathing yourself, getting dressed or grooming yourself? No   Do you have difficulty using the toilet? No   Do you have difficulty moving around from place to place? No   Do you have trouble with steps or getting out of a bed or a chair? No   Do you need help using the phone?  No   Are you deaf or do you have serious difficulty hearing?  No   Do you need help with transportation? No   Do you need help shopping? No   Do you need help preparing meals?  No   Do you need help with housework?  No   Do you need help with laundry? No   Do you need help taking your medications? No   Do you need help managing money? No   Do you ever drive or ride in a car without wearing a seat belt? No   Have you felt unusual stress, anger or loneliness in the last month? No   Who do you live with? Other   Have you been bothered in the last four weeks by sexual problems? No   Do you have difficulty concentrating, remembering or making decisions? No       Age-appropriate Screening Schedule:  Refer to the list below for future screening recommendations based on patient's age, sex and/or medical conditions. Orders for these recommended tests are listed in the plan section. The patient has been provided with a written plan.    Health Maintenance   Topic Date Due   • ZOSTER VACCINE (2 of 3) 09/13/2013   • TDAP/TD VACCINES (2 - Td or Tdap) 10/14/2031   • INFLUENZA VACCINE  Discontinued                  Jose Villafuerte  reports that  he has been smoking cigarettes. He has a 90.00 pack-year smoking history. He has never used smokeless tobacco.. I have educated him on the risk of diseases from using tobacco products such as cancer, COPD and heart disease.     I advised him to quit and he is not willing to quit.    I spent 7 minutes counseling the patient.           Assessment & Plan   CMS Preventative Services Quick Reference  Risk Factors Identified During Encounter  Alcohol Misuse  Immunizations Discussed/Encouraged (specific Immunizations; Influenza, Prevnar 20 (Pneumococcal 20-valent conjugate), Shingrix and COVID19  Tobacco Use/Dependance (use dotphrase .tobaccocessation for documentation)  The above risks/problems have been discussed with the patient.  Follow up actions/plans if indicated are seen below in the Assessment/Plan Section.  Pertinent information has been shared with the patient in the After Visit Summary.    Diagnoses and all orders for this visit:    1. Initial Medicare annual wellness visit (Primary)    2. Essential hypertension  -     amLODIPine (NORVASC) 10 MG tablet; Take 1 tablet by mouth Daily.  Dispense: 90 tablet; Refill: 4  -     lisinopril (PRINIVIL,ZESTRIL) 40 MG tablet; Take 1 tablet by mouth Daily.  Dispense: 90 tablet; Refill: 4  -     CBC & Differential  -     Comprehensive Metabolic Panel    3. Cigarette nicotine dependence with nicotine-induced disorder  -     CT Chest Low Dose Wo; Future    4. Pulmonary nodules  -     CT Chest Low Dose Wo; Future    5. Uncomplicated alcohol dependence (HCC)  -     Gamma GT    6. Screening for prostate cancer  -     PSA SCREENING    7. Dyslipidemia  -     Comprehensive Metabolic Panel  -     Lipid Panel With LDL / HDL Ratio    8. Need for influenza vaccination  -     Fluzone High-Dose 65+yrs      Social History     Substance and Sexual Activity   Alcohol Use Yes   • Alcohol/week: 56.0 standard drinks   • Types: 56 Cans of beer per week    Comment: Daily-9 BEERS     Declines  assistance with cessation of alcohol at this time.  He is aware of the dangers of heavy alcohol use such as liver failure.      Follow Up:   Return in about 6 months (around 4/28/2023) for Next scheduled follow up.     An After Visit Summary and PPPS were made available to the patient.

## 2022-10-29 LAB
ALBUMIN SERPL-MCNC: 4.5 G/DL (ref 3.5–5.2)
ALBUMIN/GLOB SERPL: 1.8 G/DL
ALP SERPL-CCNC: 111 U/L (ref 39–117)
ALT SERPL-CCNC: 39 U/L (ref 1–41)
AST SERPL-CCNC: 63 U/L (ref 1–40)
BASOPHILS # BLD AUTO: 0.03 10*3/MM3 (ref 0–0.2)
BASOPHILS NFR BLD AUTO: 0.4 % (ref 0–1.5)
BILIRUB SERPL-MCNC: 0.7 MG/DL (ref 0–1.2)
BUN SERPL-MCNC: 4 MG/DL (ref 8–23)
BUN/CREAT SERPL: 7.7 (ref 7–25)
CALCIUM SERPL-MCNC: 9.3 MG/DL (ref 8.6–10.5)
CHLORIDE SERPL-SCNC: 93 MMOL/L (ref 98–107)
CHOLEST SERPL-MCNC: 168 MG/DL (ref 0–200)
CO2 SERPL-SCNC: 24.6 MMOL/L (ref 22–29)
CREAT SERPL-MCNC: 0.52 MG/DL (ref 0.76–1.27)
EGFRCR SERPLBLD CKD-EPI 2021: 107.1 ML/MIN/1.73
EOSINOPHIL # BLD AUTO: 0.15 10*3/MM3 (ref 0–0.4)
EOSINOPHIL NFR BLD AUTO: 1.9 % (ref 0.3–6.2)
ERYTHROCYTE [DISTWIDTH] IN BLOOD BY AUTOMATED COUNT: 13 % (ref 12.3–15.4)
GGT SERPL-CCNC: 27 U/L (ref 8–61)
GLOBULIN SER CALC-MCNC: 2.5 GM/DL
GLUCOSE SERPL-MCNC: 97 MG/DL (ref 65–99)
HCT VFR BLD AUTO: 39.9 % (ref 37.5–51)
HDLC SERPL-MCNC: 109 MG/DL (ref 40–60)
HGB BLD-MCNC: 13.7 G/DL (ref 13–17.7)
IMM GRANULOCYTES # BLD AUTO: 0.02 10*3/MM3 (ref 0–0.05)
IMM GRANULOCYTES NFR BLD AUTO: 0.3 % (ref 0–0.5)
LDLC SERPL CALC-MCNC: 49 MG/DL (ref 0–100)
LDLC/HDLC SERPL: 0.46 {RATIO}
LYMPHOCYTES # BLD AUTO: 1.21 10*3/MM3 (ref 0.7–3.1)
LYMPHOCYTES NFR BLD AUTO: 15.6 % (ref 19.6–45.3)
MCH RBC QN AUTO: 35.1 PG (ref 26.6–33)
MCHC RBC AUTO-ENTMCNC: 34.3 G/DL (ref 31.5–35.7)
MCV RBC AUTO: 102.3 FL (ref 79–97)
MONOCYTES # BLD AUTO: 0.97 10*3/MM3 (ref 0.1–0.9)
MONOCYTES NFR BLD AUTO: 12.5 % (ref 5–12)
NEUTROPHILS # BLD AUTO: 5.36 10*3/MM3 (ref 1.7–7)
NEUTROPHILS NFR BLD AUTO: 69.3 % (ref 42.7–76)
NRBC BLD AUTO-RTO: 0 /100 WBC (ref 0–0.2)
PLATELET # BLD AUTO: 456 10*3/MM3 (ref 140–450)
POTASSIUM SERPL-SCNC: 4.7 MMOL/L (ref 3.5–5.2)
PROT SERPL-MCNC: 7 G/DL (ref 6–8.5)
PSA SERPL-MCNC: 2.44 NG/ML (ref 0–4)
RBC # BLD AUTO: 3.9 10*6/MM3 (ref 4.14–5.8)
SODIUM SERPL-SCNC: 131 MMOL/L (ref 136–145)
TRIGL SERPL-MCNC: 47 MG/DL (ref 0–150)
VLDLC SERPL CALC-MCNC: 10 MG/DL (ref 5–40)
WBC # BLD AUTO: 7.74 10*3/MM3 (ref 3.4–10.8)

## 2022-10-30 DIAGNOSIS — E87.1 HYPONATREMIA: ICD-10-CM

## 2022-10-30 DIAGNOSIS — F10.20 UNCOMPLICATED ALCOHOL DEPENDENCE: Primary | ICD-10-CM

## 2022-11-01 NOTE — NURSING NOTE
In x ray triage for lumbar myelogram.   Medical Necessity Information: It is in your best interest to select a reason for this procedure from the list below. All of these items fulfill various CMS LCD requirements except lesion extends to a margin. Include Z78.9 (Other Specified Conditions Influencing Health Status) As An Associated Diagnosis?: No Medical Necessity Clause: This procedure was medically necessary because the lesion that was treated was: Lab: 6 Lab Facility: 3 Size Of Lesion In Cm: 3.6 X Size Of Lesion In Cm (Optional): 0 Excision Method: Elliptical Saucerization Depth: dermis and superficial adipose tissue Repair Type: Intermediate Suturegard Retention Suture: 2-0 Nylon Retention Suture Bite Size: 3 mm Length To Time In Minutes Device Was In Place: 10 Number Of Hemigard Strips Per Side: 1 Undermining Type: Entire Wound Debridement Text: The wound edges were debrided prior to proceeding with the closure to facilitate wound healing. Helical Rim Text: The closure involved the helical rim. Vermilion Border Text: The closure involved the vermilion border. Nostril Rim Text: The closure involved the nostril rim. Retention Suture Text: Retention sutures were placed to support the closure and prevent dehiscence. Suture Removal: 7 days Epidermal Closure Graft Donor Site (Optional): simple interrupted Graft Donor Site Bandage (Optional-Leave Blank If You Don't Want In Note): Steri-strips and a pressure bandage were applied to the donor site. Detail Level: Detailed Excision Depth: adipose tissue Scalpel Size: 15 blade Anesthesia Type: 1% lidocaine with epinephrine Hemostasis: Electrocautery Estimated Blood Loss (Cc): minimal Render The Repair Note As A Separate Paragraph: Yes Deep Sutures: 3-0 Vicryl Epidermal Sutures: staples Wound Care: Petrolatum Dressing: dry sterile dressing Suturegard Intro: Intraoperative tissue expansion was performed, utilizing the SUTUREGARD device, in order to reduce wound tension. Suturegard Body: The suture ends were repeatedly re-tightened and re-clamped to achieve the desired tissue expansion. Hemigard Intro: Due to skin fragility and wound tension, it was decided to use HEMIGARD adhesive retention suture devices to permit a linear closure. The skin was cleaned and dried for a 6cm distance away from the wound. Excessive hair, if present, was removed to allow for adhesion. Hemigard Postcare Instructions: The HEMIGARD strips are to remain completely dry for at least 5-7 days. Complex Repair Preamble Text (Leave Blank If You Do Not Want): Extensive wide undermining was performed. Intermediate Repair Preamble Text (Leave Blank If You Do Not Want): Undermining was performed with blunt dissection. Fusiform Excision Additional Text (Leave Blank If You Do Not Want): The margin was drawn around the clinically apparent lesion.  A fusiform shape was then drawn on the skin incorporating the lesion and margins.  Incisions were then made along these lines to the appropriate tissue plane and the lesion was extirpated. Eliptical Excision Additional Text (Leave Blank If You Do Not Want): The margin was drawn around the clinically apparent lesion.  An elliptical shape was then drawn on the skin incorporating the lesion and margins.  Incisions were then made along these lines to the appropriate tissue plane and the lesion was extirpated. Saucerization Excision Additional Text (Leave Blank If You Do Not Want): The margin was drawn around the clinically apparent lesion.  Incisions were then made along these lines, in a tangential fashion, to the appropriate tissue plane and the lesion was extirpated. Slit Excision Additional Text (Leave Blank If You Do Not Want): A linear line was drawn on the skin overlying the lesion. An incision was made slowly until the lesion was visualized.  Once visualized, the lesion was removed with blunt dissection. Excisional Biopsy Additional Text (Leave Blank If You Do Not Want): The margin was drawn around the clinically apparent lesion. An elliptical shape was then drawn on the skin incorporating the lesion and margins.  Incisions were then made along these lines to the appropriate tissue plane and the lesion was extirpated. Perilesional Excision Additional Text (Leave Blank If You Do Not Want): The margin was drawn around the clinically apparent lesion. Incisions were then made along these lines to the appropriate tissue plane and the lesion was extirpated. Repair Performed By Another Provider Text (Leave Blank If You Do Not Want): After the tissue was excised the defect was repaired by another provider. No Repair - Repaired With Adjacent Surgical Defect Text (Leave Blank If You Do Not Want): After the excision the defect was repaired concurrently with another surgical defect which was in close approximation. Adjacent Tissue Transfer Text: The defect edges were debeveled with a #15 scalpel blade.  Given the location of the defect and the proximity to free margins an adjacent tissue transfer was deemed most appropriate.  Using a sterile surgical marker, an appropriate flap was drawn incorporating the defect and placing the expected incisions within the relaxed skin tension lines where possible.    The area thus outlined was incised deep to adipose tissue with a #15 scalpel blade.  The skin margins were undermined to an appropriate distance in all directions utilizing iris scissors. Advancement Flap (Single) Text: The defect edges were debeveled with a #15 scalpel blade.  Given the location of the defect and the proximity to free margins a single advancement flap was deemed most appropriate.  Using a sterile surgical marker, an appropriate advancement flap was drawn incorporating the defect and placing the expected incisions within the relaxed skin tension lines where possible.    The area thus outlined was incised deep to adipose tissue with a #15 scalpel blade.  The skin margins were undermined to an appropriate distance in all directions utilizing iris scissors. Advancement Flap (Double) Text: The defect edges were debeveled with a #15 scalpel blade.  Given the location of the defect and the proximity to free margins a double advancement flap was deemed most appropriate.  Using a sterile surgical marker, the appropriate advancement flaps were drawn incorporating the defect and placing the expected incisions within the relaxed skin tension lines where possible.    The area thus outlined was incised deep to adipose tissue with a #15 scalpel blade.  The skin margins were undermined to an appropriate distance in all directions utilizing iris scissors. Burow's Advancement Flap Text: The defect edges were debeveled with a #15 scalpel blade.  Given the location of the defect and the proximity to free margins a Burow's advancement flap was deemed most appropriate.  Using a sterile surgical marker, the appropriate advancement flap was drawn incorporating the defect and placing the expected incisions within the relaxed skin tension lines where possible.    The area thus outlined was incised deep to adipose tissue with a #15 scalpel blade.  The skin margins were undermined to an appropriate distance in all directions utilizing iris scissors. Chonodrocutaneous Helical Advancement Flap Text: The defect edges were debeveled with a #15 scalpel blade.  Given the location of the defect and the proximity to free margins a chondrocutaneous helical advancement flap was deemed most appropriate.  Using a sterile surgical marker, the appropriate advancement flap was drawn incorporating the defect and placing the expected incisions within the relaxed skin tension lines where possible.    The area thus outlined was incised deep to adipose tissue with a #15 scalpel blade.  The skin margins were undermined to an appropriate distance in all directions utilizing iris scissors. Crescentic Advancement Flap Text: The defect edges were debeveled with a #15 scalpel blade.  Given the location of the defect and the proximity to free margins a crescentic advancement flap was deemed most appropriate.  Using a sterile surgical marker, the appropriate advancement flap was drawn incorporating the defect and placing the expected incisions within the relaxed skin tension lines where possible.    The area thus outlined was incised deep to adipose tissue with a #15 scalpel blade.  The skin margins were undermined to an appropriate distance in all directions utilizing iris scissors. A-T Advancement Flap Text: The defect edges were debeveled with a #15 scalpel blade.  Given the location of the defect, shape of the defect and the proximity to free margins an A-T advancement flap was deemed most appropriate.  Using a sterile surgical marker, an appropriate advancement flap was drawn incorporating the defect and placing the expected incisions within the relaxed skin tension lines where possible.    The area thus outlined was incised deep to adipose tissue with a #15 scalpel blade.  The skin margins were undermined to an appropriate distance in all directions utilizing iris scissors. O-T Advancement Flap Text: The defect edges were debeveled with a #15 scalpel blade.  Given the location of the defect, shape of the defect and the proximity to free margins an O-T advancement flap was deemed most appropriate.  Using a sterile surgical marker, an appropriate advancement flap was drawn incorporating the defect and placing the expected incisions within the relaxed skin tension lines where possible.    The area thus outlined was incised deep to adipose tissue with a #15 scalpel blade.  The skin margins were undermined to an appropriate distance in all directions utilizing iris scissors. O-L Flap Text: The defect edges were debeveled with a #15 scalpel blade.  Given the location of the defect, shape of the defect and the proximity to free margins an O-L flap was deemed most appropriate.  Using a sterile surgical marker, an appropriate advancement flap was drawn incorporating the defect and placing the expected incisions within the relaxed skin tension lines where possible.    The area thus outlined was incised deep to adipose tissue with a #15 scalpel blade.  The skin margins were undermined to an appropriate distance in all directions utilizing iris scissors. O-Z Flap Text: The defect edges were debeveled with a #15 scalpel blade.  Given the location of the defect, shape of the defect and the proximity to free margins an O-Z flap was deemed most appropriate.  Using a sterile surgical marker, an appropriate transposition flap was drawn incorporating the defect and placing the expected incisions within the relaxed skin tension lines where possible. The area thus outlined was incised deep to adipose tissue with a #15 scalpel blade.  The skin margins were undermined to an appropriate distance in all directions utilizing iris scissors. Double O-Z Flap Text: The defect edges were debeveled with a #15 scalpel blade.  Given the location of the defect, shape of the defect and the proximity to free margins a Double O-Z flap was deemed most appropriate.  Using a sterile surgical marker, an appropriate transposition flap was drawn incorporating the defect and placing the expected incisions within the relaxed skin tension lines where possible. The area thus outlined was incised deep to adipose tissue with a #15 scalpel blade.  The skin margins were undermined to an appropriate distance in all directions utilizing iris scissors. V-Y Flap Text: The defect edges were debeveled with a #15 scalpel blade.  Given the location of the defect, shape of the defect and the proximity to free margins a V-Y flap was deemed most appropriate.  Using a sterile surgical marker, an appropriate advancement flap was drawn incorporating the defect and placing the expected incisions within the relaxed skin tension lines where possible.    The area thus outlined was incised deep to adipose tissue with a #15 scalpel blade.  The skin margins were undermined to an appropriate distance in all directions utilizing iris scissors. Mercedes Flap Text: The defect edges were debeveled with a #15 scalpel blade.  Given the location of the defect, shape of the defect and the proximity to free margins a Mercedes flap was deemed most appropriate.  Using a sterile surgical marker, an appropriate advancement flap was drawn incorporating the defect and placing the expected incisions within the relaxed skin tension lines where possible. The area thus outlined was incised deep to adipose tissue with a #15 scalpel blade.  The skin margins were undermined to an appropriate distance in all directions utilizing iris scissors. Modified Advancement Flap Text: The defect edges were debeveled with a #15 scalpel blade.  Given the location of the defect, shape of the defect and the proximity to free margins a modified advancement flap was deemed most appropriate.  Using a sterile surgical marker, an appropriate advancement flap was drawn incorporating the defect and placing the expected incisions within the relaxed skin tension lines where possible.    The area thus outlined was incised deep to adipose tissue with a #15 scalpel blade.  The skin margins were undermined to an appropriate distance in all directions utilizing iris scissors. Mucosal Advancement Flap Text: Given the location of the defect, shape of the defect and the proximity to free margins a mucosal advancement flap was deemed most appropriate. Incisions were made with a 15 blade scalpel in the appropriate fashion along the cutaneous vermillion border and the mucosal lip. The remaining actinically damaged mucosal tissue was excised.  The mucosal advancement flap was then elevated to the gingival sulcus with care taken to preserve the neurovascular structures and advanced into the primary defect. Care was taken to ensure that precise realignment of the vermilion border was achieved. Hatchet Flap Text: The defect edges were debeveled with a #15 scalpel blade.  Given the location of the defect, shape of the defect and the proximity to free margins a hatchet flap was deemed most appropriate.  Using a sterile surgical marker, an appropriate hatchet flap was drawn incorporating the defect and placing the expected incisions within the relaxed skin tension lines where possible.    The area thus outlined was incised deep to adipose tissue with a #15 scalpel blade.  The skin margins were undermined to an appropriate distance in all directions utilizing iris scissors. Rotation Flap Text: The defect edges were debeveled with a #15 scalpel blade.  Given the location of the defect, shape of the defect and the proximity to free margins a rotation flap was deemed most appropriate.  Using a sterile surgical marker, an appropriate rotation flap was drawn incorporating the defect and placing the expected incisions within the relaxed skin tension lines where possible.    The area thus outlined was incised deep to adipose tissue with a #15 scalpel blade.  The skin margins were undermined to an appropriate distance in all directions utilizing iris scissors. Spiral Flap Text: The defect edges were debeveled with a #15 scalpel blade.  Given the location of the defect, shape of the defect and the proximity to free margins a spiral flap was deemed most appropriate.  Using a sterile surgical marker, an appropriate rotation flap was drawn incorporating the defect and placing the expected incisions within the relaxed skin tension lines where possible. The area thus outlined was incised deep to adipose tissue with a #15 scalpel blade.  The skin margins were undermined to an appropriate distance in all directions utilizing iris scissors. Staged Advancement Flap Text: The defect edges were debeveled with a #15 scalpel blade.  Given the location of the defect, shape of the defect and the proximity to free margins a staged advancement flap was deemed most appropriate.  Using a sterile surgical marker, an appropriate advancement flap was drawn incorporating the defect and placing the expected incisions within the relaxed skin tension lines where possible. The area thus outlined was incised deep to adipose tissue with a #15 scalpel blade.  The skin margins were undermined to an appropriate distance in all directions utilizing iris scissors. Star Wedge Flap Text: The defect edges were debeveled with a #15 scalpel blade.  Given the location of the defect, shape of the defect and the proximity to free margins a star wedge flap was deemed most appropriate.  Using a sterile surgical marker, an appropriate rotation flap was drawn incorporating the defect and placing the expected incisions within the relaxed skin tension lines where possible. The area thus outlined was incised deep to adipose tissue with a #15 scalpel blade.  The skin margins were undermined to an appropriate distance in all directions utilizing iris scissors. Transposition Flap Text: The defect edges were debeveled with a #15 scalpel blade.  Given the location of the defect and the proximity to free margins a transposition flap was deemed most appropriate.  Using a sterile surgical marker, an appropriate transposition flap was drawn incorporating the defect.    The area thus outlined was incised deep to adipose tissue with a #15 scalpel blade.  The skin margins were undermined to an appropriate distance in all directions utilizing iris scissors. Muscle Hinge Flap Text: The defect edges were debeveled with a #15 scalpel blade.  Given the size, depth and location of the defect and the proximity to free margins a muscle hinge flap was deemed most appropriate.  Using a sterile surgical marker, an appropriate hinge flap was drawn incorporating the defect. The area thus outlined was incised with a #15 scalpel blade.  The skin margins were undermined to an appropriate distance in all directions utilizing iris scissors. Mustarde Flap Text: The defect edges were debeveled with a #15 scalpel blade.  Given the size, depth and location of the defect and the proximity to free margins a Mustarde flap was deemed most appropriate.  Using a sterile surgical marker, an appropriate flap was drawn incorporating the defect. The area thus outlined was incised with a #15 scalpel blade.  The skin margins were undermined to an appropriate distance in all directions utilizing iris scissors. Nasal Turnover Hinge Flap Text: The defect edges were debeveled with a #15 scalpel blade.  Given the size, depth, location of the defect and the defect being full thickness a nasal turnover hinge flap was deemed most appropriate.  Using a sterile surgical marker, an appropriate hinge flap was drawn incorporating the defect. The area thus outlined was incised with a #15 scalpel blade. The flap was designed to recreate the nasal mucosal lining and the alar rim. The skin margins were undermined to an appropriate distance in all directions utilizing iris scissors. Nasalis-Muscle-Based Myocutaneous Island Pedicle Flap Text: Using a #15 blade, an incision was made around the donor flap to the level of the nasalis muscle. Wide lateral undermining was then performed in both the subcutaneous plane above the nasalis muscle, and in a submuscular plane just above periosteum. This allowed the formation of a free nasalis muscle axial pedicle (based on the angular artery) which was still attached to the actual cutaneous flap, increasing its mobility and vascular viability. Hemostasis was obtained with pinpoint electrocoagulation. The flap was mobilized into position and the pivotal anchor points positioned and stabilized with buried interrupted sutures. Subcutaneous and dermal tissues were closed in a multilayered fashion with sutures. Tissue redundancies were excised, and the epidermal edges were apposed without significant tension and sutured with sutures. Orbicularis Oris Muscle Flap Text: The defect edges were debeveled with a #15 scalpel blade.  Given that the defect affected the competency of the oral sphincter an obicularis oris muscle flap was deemed most appropriate to restore this competency and normal muscle function.  Using a sterile surgical marker, an appropriate flap was drawn incorporating the defect. The area thus outlined was incised with a #15 scalpel blade. Melolabial Transposition Flap Text: The defect edges were debeveled with a #15 scalpel blade.  Given the location of the defect and the proximity to free margins a melolabial flap was deemed most appropriate.  Using a sterile surgical marker, an appropriate melolabial transposition flap was drawn incorporating the defect.    The area thus outlined was incised deep to adipose tissue with a #15 scalpel blade.  The skin margins were undermined to an appropriate distance in all directions utilizing iris scissors. Rhombic Flap Text: The defect edges were debeveled with a #15 scalpel blade.  Given the location of the defect and the proximity to free margins a rhombic flap was deemed most appropriate.  Using a sterile surgical marker, an appropriate rhombic flap was drawn incorporating the defect.    The area thus outlined was incised deep to adipose tissue with a #15 scalpel blade.  The skin margins were undermined to an appropriate distance in all directions utilizing iris scissors. Rhomboid Transposition Flap Text: The defect edges were debeveled with a #15 scalpel blade.  Given the location of the defect and the proximity to free margins a rhomboid transposition flap was deemed most appropriate.  Using a sterile surgical marker, an appropriate rhomboid flap was drawn incorporating the defect.    The area thus outlined was incised deep to adipose tissue with a #15 scalpel blade.  The skin margins were undermined to an appropriate distance in all directions utilizing iris scissors. Bi-Rhombic Flap Text: The defect edges were debeveled with a #15 scalpel blade.  Given the location of the defect and the proximity to free margins a bi-rhombic flap was deemed most appropriate.  Using a sterile surgical marker, an appropriate rhombic flap was drawn incorporating the defect. The area thus outlined was incised deep to adipose tissue with a #15 scalpel blade.  The skin margins were undermined to an appropriate distance in all directions utilizing iris scissors. Helical Rim Advancement Flap Text: The defect edges were debeveled with a #15 blade scalpel.  Given the location of the defect and the proximity to free margins (helical rim) a double helical rim advancement flap was deemed most appropriate.  Using a sterile surgical marker, the appropriate advancement flaps were drawn incorporating the defect and placing the expected incisions between the helical rim and antihelix where possible.  The area thus outlined was incised through and through with a #15 scalpel blade.  With a skin hook and iris scissors, the flaps were gently and sharply undermined and freed up. Bilateral Helical Rim Advancement Flap Text: The defect edges were debeveled with a #15 blade scalpel.  Given the location of the defect and the proximity to free margins (helical rim) a bilateral helical rim advancement flap was deemed most appropriate.  Using a sterile surgical marker, the appropriate advancement flaps were drawn incorporating the defect and placing the expected incisions between the helical rim and antihelix where possible.  The area thus outlined was incised through and through with a #15 scalpel blade.  With a skin hook and iris scissors, the flaps were gently and sharply undermined and freed up. Ear Star Wedge Flap Text: The defect edges were debeveled with a #15 blade scalpel.  Given the location of the defect and the proximity to free margins (helical rim) an ear star wedge flap was deemed most appropriate.  Using a sterile surgical marker, the appropriate flap was drawn incorporating the defect and placing the expected incisions between the helical rim and antihelix where possible.  The area thus outlined was incised through and through with a #15 scalpel blade. Banner Transposition Flap Text: The defect edges were debeveled with a #15 scalpel blade.  Given the location of the defect and the proximity to free margins a Banner transposition flap was deemed most appropriate.  Using a sterile surgical marker, an appropriate flap drawn around the defect. The area thus outlined was incised deep to adipose tissue with a #15 scalpel blade.  The skin margins were undermined to an appropriate distance in all directions utilizing iris scissors. Bilobed Flap Text: The defect edges were debeveled with a #15 scalpel blade.  Given the location of the defect and the proximity to free margins a bilobe flap was deemed most appropriate.  Using a sterile surgical marker, an appropriate bilobe flap drawn around the defect.    The area thus outlined was incised deep to adipose tissue with a #15 scalpel blade.  The skin margins were undermined to an appropriate distance in all directions utilizing iris scissors. Bilobed Transposition Flap Text: The defect edges were debeveled with a #15 scalpel blade.  Given the location of the defect and the proximity to free margins a bilobed transposition flap was deemed most appropriate.  Using a sterile surgical marker, an appropriate bilobe flap drawn around the defect.    The area thus outlined was incised deep to adipose tissue with a #15 scalpel blade.  The skin margins were undermined to an appropriate distance in all directions utilizing iris scissors. Trilobed Flap Text: The defect edges were debeveled with a #15 scalpel blade.  Given the location of the defect and the proximity to free margins a trilobed flap was deemed most appropriate.  Using a sterile surgical marker, an appropriate trilobed flap drawn around the defect.    The area thus outlined was incised deep to adipose tissue with a #15 scalpel blade.  The skin margins were undermined to an appropriate distance in all directions utilizing iris scissors. Dorsal Nasal Flap Text: The defect edges were debeveled with a #15 scalpel blade.  Given the location of the defect and the proximity to free margins a dorsal nasal flap was deemed most appropriate.  Using a sterile surgical marker, an appropriate dorsal nasal flap was drawn around the defect.    The area thus outlined was incised deep to adipose tissue with a #15 scalpel blade.  The skin margins were undermined to an appropriate distance in all directions utilizing iris scissors. Island Pedicle Flap Text: The defect edges were debeveled with a #15 scalpel blade.  Given the location of the defect, shape of the defect and the proximity to free margins an island pedicle advancement flap was deemed most appropriate.  Using a sterile surgical marker, an appropriate advancement flap was drawn incorporating the defect, outlining the appropriate donor tissue and placing the expected incisions within the relaxed skin tension lines where possible.    The area thus outlined was incised deep to adipose tissue with a #15 scalpel blade.  The skin margins were undermined to an appropriate distance in all directions around the primary defect and laterally outward around the island pedicle utilizing iris scissors.  There was minimal undermining beneath the pedicle flap. Island Pedicle Flap With Canthal Suspension Text: The defect edges were debeveled with a #15 scalpel blade.  Given the location of the defect, shape of the defect and the proximity to free margins an island pedicle advancement flap was deemed most appropriate.  Using a sterile surgical marker, an appropriate advancement flap was drawn incorporating the defect, outlining the appropriate donor tissue and placing the expected incisions within the relaxed skin tension lines where possible. The area thus outlined was incised deep to adipose tissue with a #15 scalpel blade.  The skin margins were undermined to an appropriate distance in all directions around the primary defect and laterally outward around the island pedicle utilizing iris scissors.  There was minimal undermining beneath the pedicle flap. A suspension suture was placed in the canthal tendon to prevent tension and prevent ectropion. Alar Island Pedicle Flap Text: The defect edges were debeveled with a #15 scalpel blade.  Given the location of the defect, shape of the defect and the proximity to the alar rim an island pedicle advancement flap was deemed most appropriate.  Using a sterile surgical marker, an appropriate advancement flap was drawn incorporating the defect, outlining the appropriate donor tissue and placing the expected incisions within the nasal ala running parallel to the alar rim. The area thus outlined was incised with a #15 scalpel blade.  The skin margins were undermined minimally to an appropriate distance in all directions around the primary defect and laterally outward around the island pedicle utilizing iris scissors.  There was minimal undermining beneath the pedicle flap. Double Island Pedicle Flap Text: The defect edges were debeveled with a #15 scalpel blade.  Given the location of the defect, shape of the defect and the proximity to free margins a double island pedicle advancement flap was deemed most appropriate.  Using a sterile surgical marker, an appropriate advancement flap was drawn incorporating the defect, outlining the appropriate donor tissue and placing the expected incisions within the relaxed skin tension lines where possible.    The area thus outlined was incised deep to adipose tissue with a #15 scalpel blade.  The skin margins were undermined to an appropriate distance in all directions around the primary defect and laterally outward around the island pedicle utilizing iris scissors.  There was minimal undermining beneath the pedicle flap. Island Pedicle Flap-Requiring Vessel Identification Text: The defect edges were debeveled with a #15 scalpel blade.  Given the location of the defect, shape of the defect and the proximity to free margins an island pedicle advancement flap was deemed most appropriate.  Using a sterile surgical marker, an appropriate advancement flap was drawn, based on the axial vessel mentioned above, incorporating the defect, outlining the appropriate donor tissue and placing the expected incisions within the relaxed skin tension lines where possible.    The area thus outlined was incised deep to adipose tissue with a #15 scalpel blade.  The skin margins were undermined to an appropriate distance in all directions around the primary defect and laterally outward around the island pedicle utilizing iris scissors.  There was minimal undermining beneath the pedicle flap. Keystone Flap Text: The defect edges were debeveled with a #15 scalpel blade.  Given the location of the defect, shape of the defect a keystone flap was deemed most appropriate.  Using a sterile surgical marker, an appropriate keystone flap was drawn incorporating the defect, outlining the appropriate donor tissue and placing the expected incisions within the relaxed skin tension lines where possible. The area thus outlined was incised deep to adipose tissue with a #15 scalpel blade.  The skin margins were undermined to an appropriate distance in all directions around the primary defect and laterally outward around the flap utilizing iris scissors. O-T Plasty Text: The defect edges were debeveled with a #15 scalpel blade.  Given the location of the defect, shape of the defect and the proximity to free margins an O-T plasty was deemed most appropriate.  Using a sterile surgical marker, an appropriate O-T plasty was drawn incorporating the defect and placing the expected incisions within the relaxed skin tension lines where possible.    The area thus outlined was incised deep to adipose tissue with a #15 scalpel blade.  The skin margins were undermined to an appropriate distance in all directions utilizing iris scissors. O-Z Plasty Text: The defect edges were debeveled with a #15 scalpel blade.  Given the location of the defect, shape of the defect and the proximity to free margins an O-Z plasty (double transposition flap) was deemed most appropriate.  Using a sterile surgical marker, the appropriate transposition flaps were drawn incorporating the defect and placing the expected incisions within the relaxed skin tension lines where possible.    The area thus outlined was incised deep to adipose tissue with a #15 scalpel blade.  The skin margins were undermined to an appropriate distance in all directions utilizing iris scissors.  Hemostasis was achieved with electrocautery.  The flaps were then transposed into place, one clockwise and the other counterclockwise, and anchored with interrupted buried subcutaneous sutures. Double O-Z Plasty Text: The defect edges were debeveled with a #15 scalpel blade.  Given the location of the defect, shape of the defect and the proximity to free margins a Double O-Z plasty (double transposition flap) was deemed most appropriate.  Using a sterile surgical marker, the appropriate transposition flaps were drawn incorporating the defect and placing the expected incisions within the relaxed skin tension lines where possible. The area thus outlined was incised deep to adipose tissue with a #15 scalpel blade.  The skin margins were undermined to an appropriate distance in all directions utilizing iris scissors.  Hemostasis was achieved with electrocautery.  The flaps were then transposed into place, one clockwise and the other counterclockwise, and anchored with interrupted buried subcutaneous sutures. V-Y Plasty Text: The defect edges were debeveled with a #15 scalpel blade.  Given the location of the defect, shape of the defect and the proximity to free margins an V-Y advancement flap was deemed most appropriate.  Using a sterile surgical marker, an appropriate advancement flap was drawn incorporating the defect and placing the expected incisions within the relaxed skin tension lines where possible.    The area thus outlined was incised deep to adipose tissue with a #15 scalpel blade.  The skin margins were undermined to an appropriate distance in all directions utilizing iris scissors. H Plasty Text: Given the location of the defect, shape of the defect and the proximity to free margins a H-plasty was deemed most appropriate for repair.  Using a sterile surgical marker, the appropriate advancement arms of the H-plasty were drawn incorporating the defect and placing the expected incisions within the relaxed skin tension lines where possible. The area thus outlined was incised deep to adipose tissue with a #15 scalpel blade. The skin margins were undermined to an appropriate distance in all directions utilizing iris scissors.  The opposing advancement arms were then advanced into place in opposite direction and anchored with interrupted buried subcutaneous sutures. W Plasty Text: The lesion was extirpated to the level of the fat with a #15 scalpel blade.  Given the location of the defect, shape of the defect and the proximity to free margins a W-plasty was deemed most appropriate for repair.  Using a sterile surgical marker, the appropriate transposition arms of the W-plasty were drawn incorporating the defect and placing the expected incisions within the relaxed skin tension lines where possible.    The area thus outlined was incised deep to adipose tissue with a #15 scalpel blade.  The skin margins were undermined to an appropriate distance in all directions utilizing iris scissors.  The opposing transposition arms were then transposed into place in opposite direction and anchored with interrupted buried subcutaneous sutures. Z Plasty Text: The lesion was extirpated to the level of the fat with a #15 scalpel blade.  Given the location of the defect, shape of the defect and the proximity to free margins a Z-plasty was deemed most appropriate for repair.  Using a sterile surgical marker, the appropriate transposition arms of the Z-plasty were drawn incorporating the defect and placing the expected incisions within the relaxed skin tension lines where possible.    The area thus outlined was incised deep to adipose tissue with a #15 scalpel blade.  The skin margins were undermined to an appropriate distance in all directions utilizing iris scissors.  The opposing transposition arms were then transposed into place in opposite direction and anchored with interrupted buried subcutaneous sutures. Zygomaticofacial Flap Text: Given the location of the defect, shape of the defect and the proximity to free margins a zygomaticofacial flap was deemed most appropriate for repair.  Using a sterile surgical marker, the appropriate flap was drawn incorporating the defect and placing the expected incisions within the relaxed skin tension lines where possible. The area thus outlined was incised deep to adipose tissue with a #15 scalpel blade with preservation of a vascular pedicle.  The skin margins were undermined to an appropriate distance in all directions utilizing iris scissors.  The flap was then placed into the defect and anchored with interrupted buried subcutaneous sutures. Cheek Interpolation Flap Text: A decision was made to reconstruct the defect utilizing an interpolation axial flap and a staged reconstruction.  A telfa template was made of the defect.  This telfa template was then used to outline the Cheek Interpolation flap.  The donor area for the pedicle flap was then injected with anesthesia.  The flap was excised through the skin and subcutaneous tissue down to the layer of the underlying musculature.  The interpolation flap was carefully excised within this deep plane to maintain its blood supply.  The edges of the donor site were undermined.   The donor site was closed in a primary fashion.  The pedicle was then rotated into position and sutured.  Once the tube was sutured into place, adequate blood supply was confirmed with blanching and refill.  The pedicle was then wrapped with xeroform gauze and dressed appropriately with a telfa and gauze bandage to ensure continued blood supply and protect the attached pedicle. Cheek-To-Nose Interpolation Flap Text: A decision was made to reconstruct the defect utilizing an interpolation axial flap and a staged reconstruction.  A telfa template was made of the defect.  This telfa template was then used to outline the Cheek-To-Nose Interpolation flap.  The donor area for the pedicle flap was then injected with anesthesia.  The flap was excised through the skin and subcutaneous tissue down to the layer of the underlying musculature.  The interpolation flap was carefully excised within this deep plane to maintain its blood supply.  The edges of the donor site were undermined.   The donor site was closed in a primary fashion.  The pedicle was then rotated into position and sutured.  Once the tube was sutured into place, adequate blood supply was confirmed with blanching and refill.  The pedicle was then wrapped with xeroform gauze and dressed appropriately with a telfa and gauze bandage to ensure continued blood supply and protect the attached pedicle. Interpolation Flap Text: A decision was made to reconstruct the defect utilizing an interpolation axial flap and a staged reconstruction.  A telfa template was made of the defect.  This telfa template was then used to outline the interpolation flap.  The donor area for the pedicle flap was then injected with anesthesia.  The flap was excised through the skin and subcutaneous tissue down to the layer of the underlying musculature.  The interpolation flap was carefully excised within this deep plane to maintain its blood supply.  The edges of the donor site were undermined.   The donor site was closed in a primary fashion.  The pedicle was then rotated into position and sutured.  Once the tube was sutured into place, adequate blood supply was confirmed with blanching and refill.  The pedicle was then wrapped with xeroform gauze and dressed appropriately with a telfa and gauze bandage to ensure continued blood supply and protect the attached pedicle. Melolabial Interpolation Flap Text: A decision was made to reconstruct the defect utilizing an interpolation axial flap and a staged reconstruction.  A telfa template was made of the defect.  This telfa template was then used to outline the melolabial interpolation flap.  The donor area for the pedicle flap was then injected with anesthesia.  The flap was excised through the skin and subcutaneous tissue down to the layer of the underlying musculature.  The pedicle flap was carefully excised within this deep plane to maintain its blood supply.  The edges of the donor site were undermined.   The donor site was closed in a primary fashion.  The pedicle was then rotated into position and sutured.  Once the tube was sutured into place, adequate blood supply was confirmed with blanching and refill.  The pedicle was then wrapped with xeroform gauze and dressed appropriately with a telfa and gauze bandage to ensure continued blood supply and protect the attached pedicle. Mastoid Interpolation Flap Text: A decision was made to reconstruct the defect utilizing an interpolation axial flap and a staged reconstruction.  A telfa template was made of the defect.  This telfa template was then used to outline the mastoid interpolation flap.  The donor area for the pedicle flap was then injected with anesthesia.  The flap was excised through the skin and subcutaneous tissue down to the layer of the underlying musculature.  The pedicle flap was carefully excised within this deep plane to maintain its blood supply.  The edges of the donor site were undermined.   The donor site was closed in a primary fashion.  The pedicle was then rotated into position and sutured.  Once the tube was sutured into place, adequate blood supply was confirmed with blanching and refill.  The pedicle was then wrapped with xeroform gauze and dressed appropriately with a telfa and gauze bandage to ensure continued blood supply and protect the attached pedicle. Posterior Auricular Interpolation Flap Text: A decision was made to reconstruct the defect utilizing an interpolation axial flap and a staged reconstruction.  A telfa template was made of the defect.  This telfa template was then used to outline the posterior auricular interpolation flap.  The donor area for the pedicle flap was then injected with anesthesia.  The flap was excised through the skin and subcutaneous tissue down to the layer of the underlying musculature.  The pedicle flap was carefully excised within this deep plane to maintain its blood supply.  The edges of the donor site were undermined.   The donor site was closed in a primary fashion.  The pedicle was then rotated into position and sutured.  Once the tube was sutured into place, adequate blood supply was confirmed with blanching and refill.  The pedicle was then wrapped with xeroform gauze and dressed appropriately with a telfa and gauze bandage to ensure continued blood supply and protect the attached pedicle. Paramedian Forehead Flap Text: A decision was made to reconstruct the defect utilizing an interpolation axial flap and a staged reconstruction.  A telfa template was made of the defect.  This telfa template was then used to outline the paramedian forehead pedicle flap.  The donor area for the pedicle flap was then injected with anesthesia.  The flap was excised through the skin and subcutaneous tissue down to the layer of the underlying musculature.  The pedicle flap was carefully excised within this deep plane to maintain its blood supply.  The edges of the donor site were undermined.   The donor site was closed in a primary fashion.  The pedicle was then rotated into position and sutured.  Once the tube was sutured into place, adequate blood supply was confirmed with blanching and refill.  The pedicle was then wrapped with xeroform gauze and dressed appropriately with a telfa and gauze bandage to ensure continued blood supply and protect the attached pedicle. Lip Wedge Excision Repair Text: Given the location of the defect and the proximity to free margins a full thickness wedge repair was deemed most appropriate.  Using a sterile surgical marker, the appropriate repair was drawn incorporating the defect and placing the expected incisions perpendicular to the vermilion border.  The vermilion border was also meticulously outlined to ensure appropriate reapproximation during the repair.  The area thus outlined was incised through and through with a #15 scalpel blade.  The muscularis and dermis were reaproximated with deep sutures following hemostasis. Care was taken to realign the vermilion border before proceeding with the superficial closure.  Once the vermilion was realigned the superfical and mucosal closure was finished. Ftsg Text: The defect edges were debeveled with a #15 scalpel blade.  Given the location of the defect, shape of the defect and the proximity to free margins a full thickness skin graft was deemed most appropriate.  Using a sterile surgical marker, the primary defect shape was transferred to the donor site. The area thus outlined was incised deep to adipose tissue with a #15 scalpel blade.  The harvested graft was then trimmed of adipose tissue until only dermis and epidermis was left.  The skin margins of the secondary defect were undermined to an appropriate distance in all directions utilizing iris scissors.  The secondary defect was closed with interrupted buried subcutaneous sutures.  The skin edges were then re-apposed with running  sutures.  The skin graft was then placed in the primary defect and oriented appropriately. Split-Thickness Skin Graft Text: The defect edges were debeveled with a #15 scalpel blade.  Given the location of the defect, shape of the defect and the proximity to free margins a split thickness skin graft was deemed most appropriate.  Using a sterile surgical marker, the primary defect shape was transferred to the donor site. The split thickness graft was then harvested.  The skin graft was then placed in the primary defect and oriented appropriately. Burow's Graft Text: The defect edges were debeveled with a #15 scalpel blade.  Given the location of the defect, shape of the defect, the proximity to free margins and the presence of a standing cone deformity a Burow's skin graft was deemed most appropriate. The standing cone was removed and this tissue was then trimmed to the shape of the primary defect. The adipose tissue was also removed until only dermis and epidermis were left.  The skin margins of the secondary defect were undermined to an appropriate distance in all directions utilizing iris scissors.  The secondary defect was closed with interrupted buried subcutaneous sutures.  The skin edges were then re-apposed with running  sutures.  The skin graft was then placed in the primary defect and oriented appropriately. Cartilage Graft Text: The defect edges were debeveled with a #15 scalpel blade.  Given the location of the defect, shape of the defect, the fact the defect involved a full thickness cartilage defect a cartilage graft was deemed most appropriate.  An appropriate donor site was identified, cleansed, and anesthetized. The cartilage graft was then harvested and transferred to the recipient site, oriented appropriately and then sutured into place.  The secondary defect was then repaired using a primary closure. Composite Graft Text: The defect edges were debeveled with a #15 scalpel blade.  Given the location of the defect, shape of the defect, the proximity to free margins and the fact the defect was full thickness a composite graft was deemed most appropriate.  The defect was outline and then transferred to the donor site.  A full thickness graft was then excised from the donor site. The graft was then placed in the primary defect, oriented appropriately and then sutured into place.  The secondary defect was then repaired using a primary closure. Epidermal Autograft Text: The defect edges were debeveled with a #15 scalpel blade.  Given the location of the defect, shape of the defect and the proximity to free margins an epidermal autograft was deemed most appropriate.  Using a sterile surgical marker, the primary defect shape was transferred to the donor site. The epidermal graft was then harvested.  The skin graft was then placed in the primary defect and oriented appropriately. Dermal Autograft Text: The defect edges were debeveled with a #15 scalpel blade.  Given the location of the defect, shape of the defect and the proximity to free margins a dermal autograft was deemed most appropriate.  Using a sterile surgical marker, the primary defect shape was transferred to the donor site. The area thus outlined was incised deep to adipose tissue with a #15 scalpel blade.  The harvested graft was then trimmed of adipose and epidermal tissue until only dermis was left.  The skin graft was then placed in the primary defect and oriented appropriately. Skin Substitute Text: The defect edges were debeveled with a #15 scalpel blade.  Given the location of the defect, shape of the defect and the proximity to free margins a skin substitute graft was deemed most appropriate.  The graft material was trimmed to fit the size of the defect. The graft was then placed in the primary defect and oriented appropriately. Tissue Cultured Epidermal Autograft Text: The defect edges were debeveled with a #15 scalpel blade.  Given the location of the defect, shape of the defect and the proximity to free margins a tissue cultured epidermal autograft was deemed most appropriate.  The graft was then trimmed to fit the size of the defect.  The graft was then placed in the primary defect and oriented appropriately. Xenograft Text: The defect edges were debeveled with a #15 scalpel blade.  Given the location of the defect, shape of the defect and the proximity to free margins a xenograft was deemed most appropriate.  The graft was then trimmed to fit the size of the defect.  The graft was then placed in the primary defect and oriented appropriately. Purse String (Intermediate) Text: Given the location of the defect and the characteristics of the surrounding skin a purse string intermediate closure was deemed most appropriate.  Undermining was performed circumferentially around the surgical defect.  A purse string suture was then placed and tightened. Purse String (Simple) Text: Given the location of the defect and the characteristics of the surrounding skin a purse string simple closure was deemed most appropriate.  Undermining was performed circumferentially around the surgical defect.  A purse string suture was then placed and tightened. Complex Repair And Single Advancement Flap Text: The defect edges were debeveled with a #15 scalpel blade.  The primary defect was closed partially with a complex linear closure.  Given the location of the remaining defect, shape of the defect and the proximity to free margins a single advancement flap was deemed most appropriate for complete closure of the defect.  Using a sterile surgical marker, an appropriate advancement flap was drawn incorporating the defect and placing the expected incisions within the relaxed skin tension lines where possible.    The area thus outlined was incised deep to adipose tissue with a #15 scalpel blade.  The skin margins were undermined to an appropriate distance in all directions utilizing iris scissors. Complex Repair And Double Advancement Flap Text: The defect edges were debeveled with a #15 scalpel blade.  The primary defect was closed partially with a complex linear closure.  Given the location of the remaining defect, shape of the defect and the proximity to free margins a double advancement flap was deemed most appropriate for complete closure of the defect.  Using a sterile surgical marker, an appropriate advancement flap was drawn incorporating the defect and placing the expected incisions within the relaxed skin tension lines where possible.    The area thus outlined was incised deep to adipose tissue with a #15 scalpel blade.  The skin margins were undermined to an appropriate distance in all directions utilizing iris scissors. Complex Repair And Modified Advancement Flap Text: The defect edges were debeveled with a #15 scalpel blade.  The primary defect was closed partially with a complex linear closure.  Given the location of the remaining defect, shape of the defect and the proximity to free margins a modified advancement flap was deemed most appropriate for complete closure of the defect.  Using a sterile surgical marker, an appropriate advancement flap was drawn incorporating the defect and placing the expected incisions within the relaxed skin tension lines where possible.    The area thus outlined was incised deep to adipose tissue with a #15 scalpel blade.  The skin margins were undermined to an appropriate distance in all directions utilizing iris scissors. Complex Repair And A-T Advancement Flap Text: The defect edges were debeveled with a #15 scalpel blade.  The primary defect was closed partially with a complex linear closure.  Given the location of the remaining defect, shape of the defect and the proximity to free margins an A-T advancement flap was deemed most appropriate for complete closure of the defect.  Using a sterile surgical marker, an appropriate advancement flap was drawn incorporating the defect and placing the expected incisions within the relaxed skin tension lines where possible.    The area thus outlined was incised deep to adipose tissue with a #15 scalpel blade.  The skin margins were undermined to an appropriate distance in all directions utilizing iris scissors. Complex Repair And O-T Advancement Flap Text: The defect edges were debeveled with a #15 scalpel blade.  The primary defect was closed partially with a complex linear closure.  Given the location of the remaining defect, shape of the defect and the proximity to free margins an O-T advancement flap was deemed most appropriate for complete closure of the defect.  Using a sterile surgical marker, an appropriate advancement flap was drawn incorporating the defect and placing the expected incisions within the relaxed skin tension lines where possible.    The area thus outlined was incised deep to adipose tissue with a #15 scalpel blade.  The skin margins were undermined to an appropriate distance in all directions utilizing iris scissors. Complex Repair And O-L Flap Text: The defect edges were debeveled with a #15 scalpel blade.  The primary defect was closed partially with a complex linear closure.  Given the location of the remaining defect, shape of the defect and the proximity to free margins an O-L flap was deemed most appropriate for complete closure of the defect.  Using a sterile surgical marker, an appropriate flap was drawn incorporating the defect and placing the expected incisions within the relaxed skin tension lines where possible.    The area thus outlined was incised deep to adipose tissue with a #15 scalpel blade.  The skin margins were undermined to an appropriate distance in all directions utilizing iris scissors. Complex Repair And Bilobe Flap Text: The defect edges were debeveled with a #15 scalpel blade.  The primary defect was closed partially with a complex linear closure.  Given the location of the remaining defect, shape of the defect and the proximity to free margins a bilobe flap was deemed most appropriate for complete closure of the defect.  Using a sterile surgical marker, an appropriate advancement flap was drawn incorporating the defect and placing the expected incisions within the relaxed skin tension lines where possible.    The area thus outlined was incised deep to adipose tissue with a #15 scalpel blade.  The skin margins were undermined to an appropriate distance in all directions utilizing iris scissors. Complex Repair And Melolabial Flap Text: The defect edges were debeveled with a #15 scalpel blade.  The primary defect was closed partially with a complex linear closure.  Given the location of the remaining defect, shape of the defect and the proximity to free margins a melolabial flap was deemed most appropriate for complete closure of the defect.  Using a sterile surgical marker, an appropriate advancement flap was drawn incorporating the defect and placing the expected incisions within the relaxed skin tension lines where possible.    The area thus outlined was incised deep to adipose tissue with a #15 scalpel blade.  The skin margins were undermined to an appropriate distance in all directions utilizing iris scissors. Complex Repair And Rotation Flap Text: The defect edges were debeveled with a #15 scalpel blade.  The primary defect was closed partially with a complex linear closure.  Given the location of the remaining defect, shape of the defect and the proximity to free margins a rotation flap was deemed most appropriate for complete closure of the defect.  Using a sterile surgical marker, an appropriate advancement flap was drawn incorporating the defect and placing the expected incisions within the relaxed skin tension lines where possible.    The area thus outlined was incised deep to adipose tissue with a #15 scalpel blade.  The skin margins were undermined to an appropriate distance in all directions utilizing iris scissors. Complex Repair And Rhombic Flap Text: The defect edges were debeveled with a #15 scalpel blade.  The primary defect was closed partially with a complex linear closure.  Given the location of the remaining defect, shape of the defect and the proximity to free margins a rhombic flap was deemed most appropriate for complete closure of the defect.  Using a sterile surgical marker, an appropriate advancement flap was drawn incorporating the defect and placing the expected incisions within the relaxed skin tension lines where possible.    The area thus outlined was incised deep to adipose tissue with a #15 scalpel blade.  The skin margins were undermined to an appropriate distance in all directions utilizing iris scissors. Complex Repair And Transposition Flap Text: The defect edges were debeveled with a #15 scalpel blade.  The primary defect was closed partially with a complex linear closure.  Given the location of the remaining defect, shape of the defect and the proximity to free margins a transposition flap was deemed most appropriate for complete closure of the defect.  Using a sterile surgical marker, an appropriate advancement flap was drawn incorporating the defect and placing the expected incisions within the relaxed skin tension lines where possible.    The area thus outlined was incised deep to adipose tissue with a #15 scalpel blade.  The skin margins were undermined to an appropriate distance in all directions utilizing iris scissors. Complex Repair And V-Y Plasty Text: The defect edges were debeveled with a #15 scalpel blade.  The primary defect was closed partially with a complex linear closure.  Given the location of the remaining defect, shape of the defect and the proximity to free margins a V-Y plasty was deemed most appropriate for complete closure of the defect.  Using a sterile surgical marker, an appropriate advancement flap was drawn incorporating the defect and placing the expected incisions within the relaxed skin tension lines where possible.    The area thus outlined was incised deep to adipose tissue with a #15 scalpel blade.  The skin margins were undermined to an appropriate distance in all directions utilizing iris scissors. Complex Repair And M Plasty Text: The defect edges were debeveled with a #15 scalpel blade.  The primary defect was closed partially with a complex linear closure.  Given the location of the remaining defect, shape of the defect and the proximity to free margins an M plasty was deemed most appropriate for complete closure of the defect.  Using a sterile surgical marker, an appropriate advancement flap was drawn incorporating the defect and placing the expected incisions within the relaxed skin tension lines where possible.    The area thus outlined was incised deep to adipose tissue with a #15 scalpel blade.  The skin margins were undermined to an appropriate distance in all directions utilizing iris scissors. Complex Repair And Double M Plasty Text: The defect edges were debeveled with a #15 scalpel blade.  The primary defect was closed partially with a complex linear closure.  Given the location of the remaining defect, shape of the defect and the proximity to free margins a double M plasty was deemed most appropriate for complete closure of the defect.  Using a sterile surgical marker, an appropriate advancement flap was drawn incorporating the defect and placing the expected incisions within the relaxed skin tension lines where possible.    The area thus outlined was incised deep to adipose tissue with a #15 scalpel blade.  The skin margins were undermined to an appropriate distance in all directions utilizing iris scissors. Complex Repair And W Plasty Text: The defect edges were debeveled with a #15 scalpel blade.  The primary defect was closed partially with a complex linear closure.  Given the location of the remaining defect, shape of the defect and the proximity to free margins a W plasty was deemed most appropriate for complete closure of the defect.  Using a sterile surgical marker, an appropriate advancement flap was drawn incorporating the defect and placing the expected incisions within the relaxed skin tension lines where possible.    The area thus outlined was incised deep to adipose tissue with a #15 scalpel blade.  The skin margins were undermined to an appropriate distance in all directions utilizing iris scissors. Complex Repair And Z Plasty Text: The defect edges were debeveled with a #15 scalpel blade.  The primary defect was closed partially with a complex linear closure.  Given the location of the remaining defect, shape of the defect and the proximity to free margins a Z plasty was deemed most appropriate for complete closure of the defect.  Using a sterile surgical marker, an appropriate advancement flap was drawn incorporating the defect and placing the expected incisions within the relaxed skin tension lines where possible.    The area thus outlined was incised deep to adipose tissue with a #15 scalpel blade.  The skin margins were undermined to an appropriate distance in all directions utilizing iris scissors. Complex Repair And Dorsal Nasal Flap Text: The defect edges were debeveled with a #15 scalpel blade.  The primary defect was closed partially with a complex linear closure.  Given the location of the remaining defect, shape of the defect and the proximity to free margins a dorsal nasal flap was deemed most appropriate for complete closure of the defect.  Using a sterile surgical marker, an appropriate flap was drawn incorporating the defect and placing the expected incisions within the relaxed skin tension lines where possible.    The area thus outlined was incised deep to adipose tissue with a #15 scalpel blade.  The skin margins were undermined to an appropriate distance in all directions utilizing iris scissors. Complex Repair And Ftsg Text: The defect edges were debeveled with a #15 scalpel blade.  The primary defect was closed partially with a complex linear closure.  Given the location of the defect, shape of the defect and the proximity to free margins a full thickness skin graft was deemed most appropriate to repair the remaining defect.  The graft was trimmed to fit the size of the remaining defect.  The graft was then placed in the primary defect, oriented appropriately, and sutured into place. Complex Repair And Burow's Graft Text: The defect edges were debeveled with a #15 scalpel blade.  The primary defect was closed partially with a complex linear closure.  Given the location of the defect, shape of the defect, the proximity to free margins and the presence of a standing cone deformity a Burow's graft was deemed most appropriate to repair the remaining defect.  The graft was trimmed to fit the size of the remaining defect.  The graft was then placed in the primary defect, oriented appropriately, and sutured into place. Complex Repair And Split-Thickness Skin Graft Text: The defect edges were debeveled with a #15 scalpel blade.  The primary defect was closed partially with a complex linear closure.  Given the location of the defect, shape of the defect and the proximity to free margins a split thickness skin graft was deemed most appropriate to repair the remaining defect.  The graft was trimmed to fit the size of the remaining defect.  The graft was then placed in the primary defect, oriented appropriately, and sutured into place. Complex Repair And Epidermal Autograft Text: The defect edges were debeveled with a #15 scalpel blade.  The primary defect was closed partially with a complex linear closure.  Given the location of the defect, shape of the defect and the proximity to free margins an epidermal autograft was deemed most appropriate to repair the remaining defect.  The graft was trimmed to fit the size of the remaining defect.  The graft was then placed in the primary defect, oriented appropriately, and sutured into place. Complex Repair And Dermal Autograft Text: The defect edges were debeveled with a #15 scalpel blade.  The primary defect was closed partially with a complex linear closure.  Given the location of the defect, shape of the defect and the proximity to free margins an dermal autograft was deemed most appropriate to repair the remaining defect.  The graft was trimmed to fit the size of the remaining defect.  The graft was then placed in the primary defect, oriented appropriately, and sutured into place. Complex Repair And Tissue Cultured Epidermal Autograft Text: The defect edges were debeveled with a #15 scalpel blade.  The primary defect was closed partially with a complex linear closure.  Given the location of the defect, shape of the defect and the proximity to free margins an tissue cultured epidermal autograft was deemed most appropriate to repair the remaining defect.  The graft was trimmed to fit the size of the remaining defect.  The graft was then placed in the primary defect, oriented appropriately, and sutured into place. Complex Repair And Xenograft Text: The defect edges were debeveled with a #15 scalpel blade.  The primary defect was closed partially with a complex linear closure.  Given the location of the defect, shape of the defect and the proximity to free margins a xenograft was deemed most appropriate to repair the remaining defect.  The graft was trimmed to fit the size of the remaining defect.  The graft was then placed in the primary defect, oriented appropriately, and sutured into place. Complex Repair And Skin Substitute Graft Text: The defect edges were debeveled with a #15 scalpel blade.  The primary defect was closed partially with a complex linear closure.  Given the location of the remaining defect, shape of the defect and the proximity to free margins a skin substitute graft was deemed most appropriate to repair the remaining defect.  The graft was trimmed to fit the size of the remaining defect.  The graft was then placed in the primary defect, oriented appropriately, and sutured into place. Consent was obtained from the patient. The risks and benefits to therapy were discussed in detail. Specifically, the risks of infection, scarring, bleeding, prolonged wound healing, incomplete removal, allergy to anesthesia, nerve injury and recurrence were addressed. Prior to the procedure, the treatment site was clearly identified and confirmed by the patient. All components of Universal Protocol/PAUSE Rule completed. Post-Care Instructions: I reviewed with the patient in detail post-care instructions. Patient is not to engage in any heavy lifting, exercise, or swimming for the next 14 days. Should the patient develop any fevers, chills, bleeding, severe pain patient will contact the office immediately. Home Suture Removal Text: Patient was provided a home suture removal kit and will remove their sutures at home.  If they have any questions or difficulties they will call the office. Where Do You Want The Question To Include Opioid Counseling Located?: Case Summary Tab Billing Type: Third-Party Bill Information: Selecting Yes will display possible errors in your note based on the variables you have selected. This validation is only offered as a suggestion for you. PLEASE NOTE THAT THE VALIDATION TEXT WILL BE REMOVED WHEN YOU FINALIZE YOUR NOTE. IF YOU WANT TO FAX A PRELIMINARY NOTE YOU WILL NEED TO TOGGLE THIS TO 'NO' IF YOU DO NOT WANT IT IN YOUR FAXED NOTE.

## 2022-11-03 ENCOUNTER — TRANSCRIBE ORDERS (OUTPATIENT)
Dept: SURGERY | Facility: SURGERY CENTER | Age: 72
End: 2022-11-03

## 2022-11-03 DIAGNOSIS — Z41.9 SURGERY, ELECTIVE: Primary | ICD-10-CM

## 2022-12-09 NOTE — SIGNIFICANT NOTE
Patient educated on the following :    - If you are receiving Sedation for your procedure Nothing to Eat 6 hours and only clear liquids for 2 hours prior to your procedure.    -You will need to have someone drive you home after your PROCEDURE and remain with you for 24 hours after the PROCEDURE  - The date of your procedure, your are welcome to have one visitor at bedside or remain within 10-15 minutes of [facility]  -You will need to arrive at 1015 on 12/13/22 PROCEDURE  -Please contact [facility] at: [phone] with any questions and/or concerns

## 2022-12-13 ENCOUNTER — HOSPITAL ENCOUNTER (OUTPATIENT)
Dept: GENERAL RADIOLOGY | Facility: SURGERY CENTER | Age: 72
Setting detail: HOSPITAL OUTPATIENT SURGERY
End: 2022-12-13

## 2022-12-13 ENCOUNTER — HOSPITAL ENCOUNTER (OUTPATIENT)
Facility: SURGERY CENTER | Age: 72
Setting detail: HOSPITAL OUTPATIENT SURGERY
Discharge: HOME | End: 2022-12-13
Attending: ANESTHESIOLOGY | Admitting: ANESTHESIOLOGY

## 2022-12-13 VITALS
WEIGHT: 140 LBS | SYSTOLIC BLOOD PRESSURE: 126 MMHG | RESPIRATION RATE: 16 BRPM | DIASTOLIC BLOOD PRESSURE: 70 MMHG | HEIGHT: 67 IN | BODY MASS INDEX: 21.97 KG/M2 | TEMPERATURE: 98.4 F | OXYGEN SATURATION: 94 % | HEART RATE: 65 BPM

## 2022-12-13 DIAGNOSIS — Z41.9 SURGERY, ELECTIVE: ICD-10-CM

## 2022-12-13 PROCEDURE — 25010000002 METHYLPREDNISOLONE PER 80 MG: Performed by: ANESTHESIOLOGY

## 2022-12-13 PROCEDURE — G0260 INJ FOR SACROILIAC JT ANESTH: HCPCS | Performed by: ANESTHESIOLOGY

## 2022-12-13 PROCEDURE — 27096 INJECT SACROILIAC JOINT: CPT | Performed by: ANESTHESIOLOGY

## 2022-12-13 PROCEDURE — 77002 NEEDLE LOCALIZATION BY XRAY: CPT

## 2022-12-13 PROCEDURE — S0260 H&P FOR SURGERY: HCPCS | Performed by: ANESTHESIOLOGY

## 2022-12-13 PROCEDURE — 20552 NJX 1/MLT TRIGGER POINT 1/2: CPT | Performed by: ANESTHESIOLOGY

## 2022-12-13 PROCEDURE — 25010000002 IOPAMIDOL 61 % SOLUTION 30 ML VIAL: Performed by: ANESTHESIOLOGY

## 2022-12-13 RX ORDER — DIAZEPAM 5 MG/1
15 TABLET ORAL
Status: COMPLETED | OUTPATIENT
Start: 2022-12-13 | End: 2022-12-13

## 2022-12-13 RX ADMIN — DIAZEPAM 15 MG: 5 TABLET ORAL at 10:53

## 2022-12-13 NOTE — DISCHARGE INSTRUCTIONS
[facility] [place] [facility] - Post-procedure Instructions          --  While there are no absolute restrictions, it is recommended that you do not perform strenuous activity today. In the morning, you may resume your level of activity as before your block.    --  If you have a band-aid at your injection site, please remove it later today. Observe the area for any redness, swelling, pus-like drainage, or a temperature over 101°. If any of these symptoms occur, please call your doctor at [phone]. If after office hours, leave a message and the on-call provider will return your call.    --  Ice may be applied to your injection site. It is recommended you avoid direct heat (heating pad; hot tub) for 1-2 days.    --  Call [facility] [place] at [phone] if you experience persistent headache, persistent bleeding from the injection site, or severe pain not relieved by heat or oral medication.    --  Do not make important decisions today.    --  Due to the effects of the block and/or the I.V. Sedation, DO NOT drive or operate hazardous machinery for 12 hours.  Local anesthetics may cause numbness after procedure and precautions must be taken with regards to operating equipment as well as with walking, even if ambulating with assistance of another person or with an assistive device.    --  Do not drink alcohol for 12 hours.    -- You may return to work tomorrow, or as directed by your referring doctor.    --  Occasionally you may notice a slight increase in your pain after the procedure. This should start to improve within the next 24-48 hours. Radiofrequency ablation procedure pain may last 3-4 weeks.    --  It may take as long as 3-4 days before you notice a gradual improvement in your pain and/or other symptoms.    -- You may continue to take your prescribed pain medication as needed.    --  Some normal possible side effects of steroid use could include fluid retention, increased blood sugar, dull headache,  increased sweating, increased appetite, mood swings and flushing.    --  Diabetics are recommended to watch their blood glucose level closely for 24-48 hours after the injection.    --  Must stay in PACU for 20 min upon arrival and prove no leg weakness before being discharged.    --  IN THE EVENT OF A LIFE THREATENING EMERGENCY, (CHEST PAIN, BREATHING DIFFICULTIES, PARALYSIS…) YOU SHOULD GO TO YOUR NEAREST EMERGENCY ROOM.    --  You should be contacted by our office within 2-3 days to schedule follow up or next appointment date.  If not contacted within 7 days, please call the office at [phone]
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
## 2022-12-13 NOTE — OP NOTE
Sacroiliac Joint Injection & Piriformis Injection  [facility]      PREOPERATIVE DIAGNOSIS:   Sacroiliac joint dysfunction  Left, Piriformis Syndrome Left, Myofascial pain of the piriformis muscles with trigger points    POSTOPERATIVE DIAGNOSIS:  Same as preoperative    PROCEDURE:  Sacroiliac Joint Injection on the Left with fluoroscopic guidance, along with piriformis muscle trigger point injections on the Left.     PRE-PROCEDURE DISCUSSION WITH PATIENT:    Risks and complications were discussed with the patient prior to starting the procedure and informed consent was obtained.  We discussed various topics including but not limited to bleeding, infection, injury, postprocedural site soreness, painful flareup, worsening of clinical picture, paralysis, coma, and death.     SURGEON:  Steven Corcoran MD    REASON FOR PROCEDURE:      There is evidence of sacroiliac dysfunction on history and physical exam, as well as positive tenderness of the piriformis muscles and positive piriformis test.      SEDATION:  He required some comfort care anxiolytic to proceed so oral diazepam was offered.  See nursing notes   ANESTHETIC AGENT:  Marcaine 0.5%  STEROID AGENT:  Methylprednisolone (DEPO MEDROL) 80mg/ml  TOTAL VOLUME OF SOLUTION:  4 mL      DESCRIPTON OF PROCEDURE:  After obtaining informed consent, IV access was not obtained in the preoperative area.  The patient was transported to the operative suite and placed in the prone position with a pillow under the pelvic area. EKG, blood pressure, and pulse oximeter were monitored. The lumbosacral area was prepped with Chloraprep and draped in a sterile fashion.     Under fluoroscopic guidance the inferior most portion of the aforementioned SI joint was identified. The overlying skin and subcutaneous tissue was anesthetized with 1% lidocaine. A 22-gauge spinal needle was introduced from the inferior most portion of the joint into the SI joint under fluoroscopic  guidance in the AP dimension with slight oblique rotation to the contralateral side.  Aspiration was negative.  After confirming the position of the needle with fluoroscopy, 1 mL of contrast dye was injected and after seeing appropriate spread into the joint a total of 2mL of the local anesthetic solution as above, was injected very slowly.  Needle was removed intact and redirected to contact the inferiormost lateral edge of the Sacroiliac Joint area, and then walked off the periosteum slightly laterally and caudad, about 1-2mm.  Subtle increase in resistance was noted as needle entered this trigger point.  Contrast dye 1ml was injected and a proper myogram of the piriformis muscle with contrast outlining the muscle toward the hip was visualized.  Then 2ml of the local anesthetic solution was injected into the muscle.        ESTIMATED BLOOD LOSS:  minimal  SPECIMENS:  None  COMPLICATIONS:  No complications were noted., There was no indication of vascular uptake on live injection of contrast dye., There was no indication of intrathecal uptake on live injection of contrast dye., There was not any evidence of dural puncture.   and The patient did not have any signs of postprocedure numbness nor weakness.    TOLERANCE & DISCHARGE CONDITION:    The patient tolerated the procedure well.  The patient was transported to the recovery area without difficulties.  The patient was discharged to home under the care of family in stable and satisfactory condition.    PLAN OF CARE:  1. The patient was given our standard instruction sheet and will resume all medications as per the medication reconciliation sheet.  2. The patient will Return to clinic in 8 wks.  3. The patient is instructed to keep a pain log hourly for 8 hours after the procedure.

## 2023-02-21 ENCOUNTER — PREP FOR SURGERY (OUTPATIENT)
Dept: SURGERY | Facility: SURGERY CENTER | Age: 73
End: 2023-02-21
Payer: MEDICARE

## 2023-02-21 ENCOUNTER — OFFICE VISIT (OUTPATIENT)
Dept: PAIN MEDICINE | Facility: CLINIC | Age: 73
End: 2023-02-21
Payer: MEDICARE

## 2023-02-21 VITALS
DIASTOLIC BLOOD PRESSURE: 57 MMHG | SYSTOLIC BLOOD PRESSURE: 118 MMHG | BODY MASS INDEX: 20.62 KG/M2 | OXYGEN SATURATION: 97 % | HEART RATE: 83 BPM | RESPIRATION RATE: 12 BRPM | TEMPERATURE: 98.6 F | WEIGHT: 131.4 LBS | HEIGHT: 67 IN

## 2023-02-21 DIAGNOSIS — M46.1 SACROILIITIS: Primary | ICD-10-CM

## 2023-02-21 DIAGNOSIS — M54.16 LUMBAR RADICULOPATHY: ICD-10-CM

## 2023-02-21 DIAGNOSIS — M79.18 PIRIFORMIS MUSCLE PAIN: ICD-10-CM

## 2023-02-21 DIAGNOSIS — F10.20 ALCOHOLISM: ICD-10-CM

## 2023-02-21 DIAGNOSIS — M48.062 SPINAL STENOSIS OF LUMBAR REGION WITH NEUROGENIC CLAUDICATION: ICD-10-CM

## 2023-02-21 PROCEDURE — 99214 OFFICE O/P EST MOD 30 MIN: CPT

## 2023-02-21 RX ORDER — DIAZEPAM 5 MG/1
10 TABLET ORAL ONCE
OUTPATIENT
Start: 2023-02-21

## 2023-02-21 NOTE — PROGRESS NOTES
CHIEF COMPLAINT  Low back/SI joint pain    Subjective   Jose Villafuerte is a 72 y.o. male  who presents to the office for follow-up of procedure.  He completed a left SI joint and left piriformis injection on 12/13/23 performed by Dr. Corcoran for management of low back/sacroiliac pain. Patient reports 50%  relief from the procedure x 6 weeks. He reports that pain has been gradually worsening over the last week and wants to discuss repeating the procedure.    Today pain is 6/10VAS in severity. Pain is located in his left buttock and piriformis muscle. Describes this pain as a nearly continuous aching and burning. Pain is worsened by prolonged standing or sitting. Pain improves with rest/reposition.  Patient reports that he uses the treadmill and an inversion table when able.    Patient has been evaluated by Dr. Mackey in the past who recommended a left L5-S1 laminectomy, foraminotomies, and medial facetectomy with Metrix.  Dr. Mackey declined moving forward with operative intervention secondary to patient's history of alcohol abuse.  Patient notes that he does continue to drink but that he has cut down to 7-8 beers per day.  He notes that this makes him feel better overall.  He also reports that he is no longer using marijuana.  He would like to continue to decrease the amount of beers per day so that he can move forward with the surgery.    Procedures:  12/13/23 - Left SI joint and left piriformis injection - 50% relief x 6 weeks   9/7/22 - Left Piriformis injection - 50% relief x 4 weeks  5/25/22 - Left L2 & L5 TF TOM     Back Pain  This is a chronic problem. The current episode started more than 1 year ago. The problem occurs constantly. The problem has been gradually worsening since onset. The pain is present in the lumbar spine, sacro-iliac and gluteal. The quality of the pain is described as aching and burning. The pain radiates to the left thigh (left buttock). The pain is at a severity of 6/10. The pain is  moderate. The symptoms are aggravated by standing, sitting and position (prolonged position). Associated symptoms include weakness (legs). Pertinent negatives include no abdominal pain, chest pain, dysuria, fever, headaches or numbness. He has tried bed rest, heat, ice, walking, NSAIDs and muscle relaxant for the symptoms.      PEG Assessment   What number best describes your pain on average in the past week?7  What number best describes how, during the past week, pain has interfered with your enjoyment of life?0  What number best describes how, during the past week, pain has interfered with your general activity?  2    Review of Pertinent Medical Data ---  Office note from SUKHI Baker from 10/25/2022.  Presents for follow-up of procedure.  He completed a left piriformis injection on 9/27/2022 performed by Dr. Corcoran for management of piriformis muscle pain.  Patient reports 50% relief from the procedure for 4 weeks.  He notes that the pain has progressively worsened over the last several weeks primarily affecting the left posterior buttock and piriformis muscle pain is worsened by prolonged sitting.  Patient notes that lumbar transforaminal epidurals are not working as well as they used to.  He has been evaluated by Dr. Reyes and Dr. John and both declined proceeding with operative intervention secondary to patient's history of alcohol abuse.  He is a candidate to proceed with a left L5-S1 laminectomy, foraminotomies, or medial facetectomy with Metrix.  Patient reports that he continues to drink approximately 8 to 12 cans of beer daily and smokes marijuana once a month to control his pain.  Plan at this appointment was to return for diagnostic left SI joint injection with piriformis muscle injection. Patient is to return to clinic in 2 to 3 months following evaluation.    MRI OF THE LUMBAR SPINE WITHOUT CONTRAST     CLINICAL HISTORY: Low back pain. Neurogenic claudication.     TECHNIQUE: MRI of the lumbar  spine was obtained with sagittal T1,  proton-density, and T2-weighted images. Additionally, there are axial T1  and T2-weighted images through the lumbar spine.     COMPARISON: Comparison is made to previous MRI lumbar of the spine dated  02/04/2021.     FINDINGS:     The conus medullaris terminates at the level of the L1-2 interspace and  has normal signal intensity. The visualized distal thoracic spinal cord  is unremarkable in appearance.     Again noted is a mild chronic compression deformity at the L1 level with  approximately 30-35% loss of vertebral body height within the maximally  compressed portion of the vertebra excluding the Schmorl's node within  the superior endplate. When compared to the prior study, there is no  significant interval change in the degree of vertebral body height loss.     At L1-2 and L2-3, there is no significant degree of canal or foraminal  narrowing.     At L3-4, there is minimal bulging disc material. There is a posterior  annular fissure. There is degenerative retrolisthesis of L3 on L4 by  approximately 1 to 2 mm. There is minimal canal and foraminal narrowing  secondary to bulging disc material. No significant interval change is  seen in these findings when compared to the prior study. There is mild  degenerative endplate marrow edema within the anterior and superior  endplate of L4 which is more pronounced when compared to the prior  study.     At L4-5, there is bulging disc material with a posterior annular  fissure. Mild facet arthropathy is seen. Minimal left foraminal  narrowing is seen secondary to bulging disc material. There is no  significant degree of canal narrowing. Similar findings were noted on  the prior exam.     At L5-S1, mild facet arthropathy is seen. Degenerative retrolisthesis of  L5 on S1 by approximately 3 mm is identified. A disc osteophyte complex  results in a mild-to-moderate degree of right and a mild degree of left  foraminal narrowing. The disc  osteophyte complex also minimally indents  the ventral subarachnoid space and abuts the descending right S1 nerve  root sleeve just as it exits the thecal sac. No significant interval  change is seen when compared to the prior exam.     IMPRESSION:     1. Stable findings when compared to the prior study dated 02/04/2021.     2. There is a mild chronic compression deformity at the L1 level with  approximately 30-35% loss of vertebral body height within the maximally  compressed portion of the vertebra.     3. Multilevel relatively mild foraminal narrowing and minimal canal  narrowing are as discussed in detail above. The most prominent foraminal  stenosis is identified within the right L5-S1 neural foramen where there  is a mild-to-moderate degree of foraminal stenosis secondary to a disc  osteophyte complex.     4. The remaining multilevel degenerative changes are as discussed in  detail above.     This report was finalized on 8/9/2022 7:00 AM by Dr. Zach Singh M.D.     The following portions of the patient's history were reviewed and updated as appropriate: allergies, current medications, past family history, past medical history, past social history, past surgical history and problem list.    Review of Systems   Constitutional: Negative for activity change, fatigue and fever.   HENT: Negative for congestion.    Eyes: Negative for visual disturbance.   Respiratory: Positive for cough (smoker). Negative for chest tightness.    Cardiovascular: Negative for chest pain.   Gastrointestinal: Negative for abdominal pain, constipation and diarrhea.   Genitourinary: Negative for difficulty urinating and dysuria.   Musculoskeletal: Positive for back pain.   Neurological: Positive for dizziness and weakness (legs). Negative for light-headedness, numbness and headaches.   Psychiatric/Behavioral: Negative for agitation, sleep disturbance and suicidal ideas. The patient is not nervous/anxious.      I have reviewed and  "confirmed the accuracy of the ROS as documented by the MA/LPN/RN Gabriela ABEBA Pisano, APRN     Vitals:    02/21/23 1031   BP: 118/57   BP Location: Left arm   Patient Position: Sitting   Cuff Size: Adult   Pulse: 83   Resp: 12   Temp: 98.6 °F (37 °C)   TempSrc: Temporal   SpO2: 97%   Weight: 59.6 kg (131 lb 6.4 oz)   Height: 170.2 cm (67\")   PainSc:   6     Objective   Physical Exam  Constitutional:       Appearance: Normal appearance.   HENT:      Head: Normocephalic.   Cardiovascular:      Rate and Rhythm: Normal rate and regular rhythm.   Pulmonary:      Effort: Pulmonary effort is normal.      Breath sounds: Normal breath sounds.   Musculoskeletal:         General: Normal range of motion.      Cervical back: Normal range of motion.      Lumbar back: Spasms and tenderness present.      Comments: + Left FABERs  + Left SI compression test  + Left thigh thrust  + tenderness to palpation over left SI joint   Skin:     General: Skin is warm and dry.      Capillary Refill: Capillary refill takes less than 2 seconds.   Neurological:      General: No focal deficit present.      Mental Status: He is alert and oriented to person, place, and time.      Gait: Gait abnormal (slowed).   Psychiatric:         Mood and Affect: Mood normal.         Behavior: Behavior normal.         Thought Content: Thought content normal.         Cognition and Memory: Cognition normal.       Assessment & Plan   Diagnoses and all orders for this visit:    1. Sacroiliitis (HCC) (Primary)    2. Piriformis muscle pain    3. Lumbar radiculopathy    4. Alcoholism (HCC)    5. Spinal stenosis of lumbar region with neurogenic claudication    --- Repeat Left SI joint injection and left piriformis injection  ----------  Education about Sacroiliac joint injections:  This Sacroiliac joint injection (blockade) we have suggested is intended for diagnostic purposes, with the intent of offering the patient Radiofrequency thermal rhizotomy (RF) of the sensory " branches to the joint if the block is diagnostically effective.  The diagnostic blockade is necessary to determine the likelihood that RF therapy could be efficacious in providing long term relief to the patient.    In this procedure, the sacroiliac joint is aligned with imaging, and under image guidance a needle is placed with the needle tip into the joint.  The needle position is confirmed to be appropriately in the joint before injection of medication into the joint.  When xray fluoroscopy is used, contrast dye is used to confirm a proper arthrogram (i.e., outline of the joint).  When ultrasound is used, IV fluid (normal saline) is injected to see the flow of the fluid into the joint.  Once confirmed, then the medication can be injected into the joint.  Oftentimes this medication is a combination of local anesthetics (for diagnostic purposes) and also a steroid (to decrease irritation & inflammation in the joint, also known as sacroilitis).      Medically, a successful RF procedure should provide a patient with 50% pain relief or more for at least 6 months.  Clinical experience suggests that successful patients receive relief more in the range of 12 months on average.  We also discussed that many patients receive therapeutic success from the intraarticular joint injection, and may not require RF ablation.  If a patient receives more than 8 weeks of relief from joint injection, then occasional repeat joint blocks for therapeutic purposes is a very reasonable alternative therapy.  This course of therapy is consistent with our LCDs according to our CMS  in the area, and therefore other insurance providers should follow accordingly.  We will monitor our patients to screen for these therapeutic responders and will offer RF therapy only when necessary.      We discussed that joint injections & also RF procedures are not without risks.  Best practices regarding anticoagulant use & neuraxial procedures will  be respected.  Oftentimes a patient on an anticoagulant can be offered a joint injection safely, but again this is not risk-free, and such patients give consent with regards to this increased bleeding risk, which could cause problems including but not limited to worsening of pain, nerve damage, or muscle damage.  Patients that are ill or otherwise may be at risk for sepsis will not have their spines accessed by neuraxial injections of any type.  This patient will not be offered these therapies if there is an increased risk.   We discussed that there is a risk of postprocedural pain and also a risk of worsening of clinical picture with these procedures as with any neuraxial procedure.    ----------  --- Encourage patient to continue working on decreasing number of drinks per day in order to proceed with surgery as recommended by Dr. Mackey.   --- Return to clinic in 2-3 months for evaluation of procedure. At this time, plan to evaluate need for repeating the procedure.      YUNIEL REPORT  As the clinician, I personally reviewed the YUNIEL from 2/21/23 while the patient was in the office today.    Dictated utilizing Dragon dictation.      Patient remained masked during entire encounter. No cough present. I donned a mask and eye protection throughout entire visit. Prior to donning mask and eye protection, hand hygiene was performed, as well as when it was doffed.  I was closer than 6 feet, but not for an extended period of time. No obvious exposure to any bodily fluids.

## 2023-02-22 ENCOUNTER — TRANSCRIBE ORDERS (OUTPATIENT)
Dept: SURGERY | Facility: SURGERY CENTER | Age: 73
End: 2023-02-22
Payer: MEDICARE

## 2023-02-22 DIAGNOSIS — Z41.9 SURGERY, ELECTIVE: Primary | ICD-10-CM

## 2023-04-04 ENCOUNTER — HOSPITAL ENCOUNTER (OUTPATIENT)
Dept: CT IMAGING | Facility: HOSPITAL | Age: 73
Discharge: HOME | End: 2023-04-04
Admitting: FAMILY MEDICINE
Payer: MEDICARE

## 2023-04-04 DIAGNOSIS — F17.219 CIGARETTE NICOTINE DEPENDENCE WITH NICOTINE-INDUCED DISORDER: ICD-10-CM

## 2023-04-04 DIAGNOSIS — R91.8 PULMONARY NODULES: ICD-10-CM

## 2023-04-04 PROCEDURE — 71271 CT THORAX LUNG CANCER SCR C-: CPT

## 2023-05-01 ENCOUNTER — OFFICE VISIT (OUTPATIENT)
Dept: INTERNAL MEDICINE | Facility: CLINIC | Age: 73
End: 2023-05-01
Payer: MEDICARE

## 2023-05-01 VITALS
SYSTOLIC BLOOD PRESSURE: 140 MMHG | BODY MASS INDEX: 20.72 KG/M2 | OXYGEN SATURATION: 97 % | DIASTOLIC BLOOD PRESSURE: 80 MMHG | HEART RATE: 79 BPM | HEIGHT: 67 IN | RESPIRATION RATE: 18 BRPM | WEIGHT: 132 LBS

## 2023-05-01 DIAGNOSIS — I70.0 AORTIC ATHEROSCLEROSIS: ICD-10-CM

## 2023-05-01 DIAGNOSIS — I10 ESSENTIAL HYPERTENSION: Primary | Chronic | ICD-10-CM

## 2023-05-01 DIAGNOSIS — I25.10 ATHEROSCLEROSIS OF NATIVE CORONARY ARTERY OF NATIVE HEART WITHOUT ANGINA PECTORIS: ICD-10-CM

## 2023-05-01 PROCEDURE — 3077F SYST BP >= 140 MM HG: CPT | Performed by: FAMILY MEDICINE

## 2023-05-01 PROCEDURE — 1160F RVW MEDS BY RX/DR IN RCRD: CPT | Performed by: FAMILY MEDICINE

## 2023-05-01 PROCEDURE — 1159F MED LIST DOCD IN RCRD: CPT | Performed by: FAMILY MEDICINE

## 2023-05-01 PROCEDURE — 3079F DIAST BP 80-89 MM HG: CPT | Performed by: FAMILY MEDICINE

## 2023-05-01 PROCEDURE — 99214 OFFICE O/P EST MOD 30 MIN: CPT | Performed by: FAMILY MEDICINE

## 2023-05-01 RX ORDER — ASPIRIN 81 MG/1
81 TABLET ORAL DAILY
COMMUNITY

## 2023-05-01 NOTE — PROGRESS NOTES
"Chief Complaint  Follow-up and Hypertension    Subjective        Jose Villafuerte presents to [facility]  History of Present Illness     Hypertension - stable today in the office.  Patient taking medication as prescribed.    Patient is taking amlodipine, lisinopril.  Denies side effects of the medication.    His CT chest for lung cancer screening looked okay except for he has extensive atherosclerosis of the coronary artery as well as atherosclerosis of thoracoabdominal aorta and branch vessels.  Denies angina.  Last cholesterol in October showed a great LDL of 49.          Objective   Vital Signs:  /80 (BP Location: Left arm, Patient Position: Sitting, Cuff Size: Small Adult)   Pulse 79   Resp 18   Ht 170.2 cm (67\")   Wt 59.9 kg (132 lb)   SpO2 97%   BMI 20.67 kg/m²   Estimated body mass index is 20.67 kg/m² as calculated from the following:    Height as of this encounter: 170.2 cm (67\").    Weight as of this encounter: 59.9 kg (132 lb).       BMI is within normal parameters. No other follow-up for BMI required.      Physical Exam  Vitals and nursing note reviewed.   Constitutional:       General: He is not in acute distress.     Appearance: Normal appearance.   Cardiovascular:      Rate and Rhythm: Normal rate and regular rhythm.      Heart sounds: Normal heart sounds. No murmur heard.  Pulmonary:      Effort: Pulmonary effort is normal.      Breath sounds: Normal breath sounds.   Neurological:      Mental Status: He is alert.        Result Review :  The following data was reviewed by: John Etienne MD on 05/01/2023:  Common labs        10/28/2022    11:54   Common Labs   Glucose 97     BUN 4     Creatinine 0.52     Sodium 131     Potassium 4.7     Chloride 93     Calcium 9.3     Total Protein 7.0     Albumin 4.50     Total Bilirubin 0.7     Alkaline Phosphatase 111     AST (SGOT) 63     ALT (SGPT) 39     WBC 7.74     Hemoglobin 13.7     Hematocrit 39.9     Platelets " 456     Total Cholesterol 168     Triglycerides 47     HDL Cholesterol 109     LDL Cholesterol  49     PSA 2.440                    Assessment and Plan   Diagnoses and all orders for this visit:    1. Essential hypertension (Primary)  -     CBC (No Diff)  -     Comprehensive Metabolic Panel    2. Atherosclerosis of native coronary artery of native heart without angina pectoris  -     CBC (No Diff)  -     Comprehensive Metabolic Panel  -     Lipid Panel With LDL / HDL Ratio    3. Aortic atherosclerosis  -     CBC (No Diff)  -     Comprehensive Metabolic Panel  -     Lipid Panel With LDL / HDL Ratio         I would recommend starting a low dose ASA for the atherosclerosis of the heart and start low dose of statin after lipid panel returns.  HTN is stable in the office today.  Continue current medications.         Follow Up   Return in about 6 months (around 11/1/2023) for Next scheduled follow up.  Patient was given instructions and counseling regarding his condition or for health maintenance advice. Please see specific information pulled into the AVS if appropriate.

## 2023-05-02 LAB
ALBUMIN SERPL-MCNC: 4.5 G/DL (ref 3.5–5.2)
ALBUMIN/GLOB SERPL: 1.6 G/DL
ALP SERPL-CCNC: 136 U/L (ref 39–117)
ALT SERPL-CCNC: 22 U/L (ref 1–41)
AST SERPL-CCNC: 27 U/L (ref 1–40)
BILIRUB SERPL-MCNC: 0.5 MG/DL (ref 0–1.2)
BUN SERPL-MCNC: 5 MG/DL (ref 8–23)
BUN/CREAT SERPL: 8.9 (ref 7–25)
CALCIUM SERPL-MCNC: 10.6 MG/DL (ref 8.6–10.5)
CHLORIDE SERPL-SCNC: 94 MMOL/L (ref 98–107)
CHOLEST SERPL-MCNC: 162 MG/DL (ref 0–200)
CO2 SERPL-SCNC: 27.2 MMOL/L (ref 22–29)
CREAT SERPL-MCNC: 0.56 MG/DL (ref 0.76–1.27)
EGFRCR SERPLBLD CKD-EPI 2021: 104.1 ML/MIN/1.73
ERYTHROCYTE [DISTWIDTH] IN BLOOD BY AUTOMATED COUNT: 12.6 % (ref 12.3–15.4)
GLOBULIN SER CALC-MCNC: 2.9 GM/DL
GLUCOSE SERPL-MCNC: 96 MG/DL (ref 65–99)
HCT VFR BLD AUTO: 43.9 % (ref 37.5–51)
HDLC SERPL-MCNC: 66 MG/DL (ref 40–60)
HGB BLD-MCNC: 15.3 G/DL (ref 13–17.7)
LDLC SERPL CALC-MCNC: 85 MG/DL (ref 0–100)
LDLC/HDLC SERPL: 1.3 {RATIO}
MCH RBC QN AUTO: 34.7 PG (ref 26.6–33)
MCHC RBC AUTO-ENTMCNC: 34.9 G/DL (ref 31.5–35.7)
MCV RBC AUTO: 99.5 FL (ref 79–97)
PLATELET # BLD AUTO: 446 10*3/MM3 (ref 140–450)
POTASSIUM SERPL-SCNC: 5.2 MMOL/L (ref 3.5–5.2)
PROT SERPL-MCNC: 7.4 G/DL (ref 6–8.5)
RBC # BLD AUTO: 4.41 10*6/MM3 (ref 4.14–5.8)
SODIUM SERPL-SCNC: 132 MMOL/L (ref 136–145)
TRIGL SERPL-MCNC: 51 MG/DL (ref 0–150)
VLDLC SERPL CALC-MCNC: 11 MG/DL (ref 5–40)
WBC # BLD AUTO: 8.95 10*3/MM3 (ref 3.4–10.8)

## 2023-05-09 ENCOUNTER — OFFICE VISIT (OUTPATIENT)
Dept: PAIN MEDICINE | Facility: CLINIC | Age: 73
End: 2023-05-09
Payer: MEDICARE

## 2023-05-09 ENCOUNTER — PREP FOR SURGERY (OUTPATIENT)
Dept: SURGERY | Facility: SURGERY CENTER | Age: 73
End: 2023-05-09
Payer: MEDICARE

## 2023-05-09 VITALS
RESPIRATION RATE: 20 BRPM | TEMPERATURE: 97.8 F | BODY MASS INDEX: 20.56 KG/M2 | HEART RATE: 69 BPM | DIASTOLIC BLOOD PRESSURE: 70 MMHG | SYSTOLIC BLOOD PRESSURE: 128 MMHG | OXYGEN SATURATION: 97 % | HEIGHT: 67 IN | WEIGHT: 131 LBS

## 2023-05-09 DIAGNOSIS — M54.16 LUMBAR RADICULOPATHY: Primary | ICD-10-CM

## 2023-05-09 DIAGNOSIS — M54.42 LEFT-SIDED LOW BACK PAIN WITH LEFT-SIDED SCIATICA, UNSPECIFIED CHRONICITY: ICD-10-CM

## 2023-05-09 DIAGNOSIS — M48.062 SPINAL STENOSIS OF LUMBAR REGION WITH NEUROGENIC CLAUDICATION: ICD-10-CM

## 2023-05-09 RX ORDER — DIAZEPAM 5 MG/1
10 TABLET ORAL ONCE
OUTPATIENT
Start: 2023-05-09

## 2023-05-09 NOTE — PROGRESS NOTES
CHIEF COMPLAINT  Back and left leg pain    Subjective   Jose Villafuerte is a 73 y.o. male  who presents for follow-up.  He has a history of chronic back and left leg pain. He reports that his pain has worsened since his last office visit.    Today pain is 8/10VAS in severity. Pain is located in his left buttock and radiates down left posterior leg. Denies radicular pain to right leg. Describes this pain as a nearly continuous aching and burning. Pain is worsened by prolonged standing or sitting. Pain improves with rest/reposition.  Patient reports that he uses the treadmill and an inversion table when able.     Patient has been evaluated by Dr. Mackey in the past who recommended a left L5-S1 laminectomy, foraminotomies, and medial facetectomy with Metrix.  Dr. Mackey declined moving forward with operative intervention secondary to patient's history of alcohol abuse.  Patient notes that he does continue to drink but that he has cut down to 7-8 beers per day.  He notes that this makes him feel better overall.  He also reports that he is no longer using marijuana.  He would like to continue to decrease the amount of beers per day so that he can move forward with the surgery.    He was recently started on Aspirin 81mg for atherosclerosis of the heart.      Procedures:  12/13/23 - Left SI joint and left piriformis injection - 50% relief x 6 weeks   9/7/22 - Left Piriformis injection - 50% relief x 4 weeks  5/25/22 - Left L2 & L5 TF TOM     Back Pain  This is a chronic problem. The current episode started more than 1 year ago. The problem occurs constantly. The problem has been gradually worsening since onset. The pain is present in the lumbar spine, sacro-iliac and gluteal. The quality of the pain is described as aching and burning. The pain radiates to the left thigh (Radiates into left buttock and down left posterior leg stopping at the knee). The pain is at a severity of 8/10. The pain is moderate. The symptoms are  aggravated by standing, sitting and position (prolonged position). Associated symptoms include weakness (legs). Pertinent negatives include no abdominal pain, chest pain, dysuria, fever, headaches or numbness. He has tried bed rest, heat, ice, walking, NSAIDs and muscle relaxant for the symptoms.      PEG Assessment   What number best describes your pain on average in the past week?8  What number best describes how, during the past week, pain has interfered with your enjoyment of life?0  What number best describes how, during the past week, pain has interfered with your general activity?  0    Review of Pertinent Medical Data ---  MRI OF THE LUMBAR SPINE WITHOUT CONTRAST     CLINICAL HISTORY: Low back pain. Neurogenic claudication.     TECHNIQUE: MRI of the lumbar spine was obtained with sagittal T1,  proton-density, and T2-weighted images. Additionally, there are axial T1  and T2-weighted images through the lumbar spine.     COMPARISON: Comparison is made to previous MRI lumbar of the spine dated  02/04/2021.     FINDINGS:     The conus medullaris terminates at the level of the L1-2 interspace and  has normal signal intensity. The visualized distal thoracic spinal cord  is unremarkable in appearance.     Again noted is a mild chronic compression deformity at the L1 level with  approximately 30-35% loss of vertebral body height within the maximally  compressed portion of the vertebra excluding the Schmorl's node within  the superior endplate. When compared to the prior study, there is no  significant interval change in the degree of vertebral body height loss.     At L1-2 and L2-3, there is no significant degree of canal or foraminal  narrowing.     At L3-4, there is minimal bulging disc material. There is a posterior  annular fissure. There is degenerative retrolisthesis of L3 on L4 by  approximately 1 to 2 mm. There is minimal canal and foraminal narrowing  secondary to bulging disc material. No significant  interval change is  seen in these findings when compared to the prior study. There is mild  degenerative endplate marrow edema within the anterior and superior  endplate of L4 which is more pronounced when compared to the prior  study.     At L4-5, there is bulging disc material with a posterior annular  fissure. Mild facet arthropathy is seen. Minimal left foraminal  narrowing is seen secondary to bulging disc material. There is no  significant degree of canal narrowing. Similar findings were noted on  the prior exam.     At L5-S1, mild facet arthropathy is seen. Degenerative retrolisthesis of  L5 on S1 by approximately 3 mm is identified. A disc osteophyte complex  results in a mild-to-moderate degree of right and a mild degree of left  foraminal narrowing. The disc osteophyte complex also minimally indents  the ventral subarachnoid space and abuts the descending right S1 nerve  root sleeve just as it exits the thecal sac. No significant interval  change is seen when compared to the prior exam.     IMPRESSION:     1. Stable findings when compared to the prior study dated 02/04/2021.     2. There is a mild chronic compression deformity at the L1 level with  approximately 30-35% loss of vertebral body height within the maximally  compressed portion of the vertebra.     3. Multilevel relatively mild foraminal narrowing and minimal canal  narrowing are as discussed in detail above. The most prominent foraminal  stenosis is identified within the right L5-S1 neural foramen where there  is a mild-to-moderate degree of foraminal stenosis secondary to a disc  osteophyte complex.     4. The remaining multilevel degenerative changes are as discussed in  detail above.     This report was finalized on 8/9/2022 7:00 AM by Dr. Zach Singh M.D.    The following portions of the patient's history were reviewed and updated as appropriate: allergies, current medications, past family history, past medical history, past social  "history, past surgical history and problem list.    Review of Systems   Constitutional: Negative for activity change, fatigue and fever.   HENT: Negative for congestion.    Eyes: Negative for visual disturbance.   Respiratory: Negative for cough and chest tightness.    Cardiovascular: Negative for chest pain.   Gastrointestinal: Negative for abdominal pain, constipation and diarrhea.   Genitourinary: Negative for difficulty urinating and dysuria.   Musculoskeletal: Positive for back pain.   Neurological: Positive for weakness (legs). Negative for dizziness, light-headedness, numbness and headaches.   Psychiatric/Behavioral: Negative for agitation, sleep disturbance and suicidal ideas. The patient is not nervous/anxious.      I have reviewed and confirmed the accuracy of the ROS as documented by the MA/LPN/RN JENNIFER Wade    Vitals:    05/09/23 1032   BP: 128/70   BP Location: Left arm   Patient Position: Sitting   Cuff Size: Large Adult   Pulse: 69   Resp: 20   Temp: 97.8 °F (36.6 °C)   TempSrc: Temporal   SpO2: 97%   Weight: 59.4 kg (131 lb)   Height: 170.2 cm (67\")   PainSc:   8     Objective   Physical Exam  Constitutional:       Appearance: Normal appearance.   HENT:      Head: Normocephalic.   Cardiovascular:      Rate and Rhythm: Normal rate and regular rhythm.   Pulmonary:      Effort: Pulmonary effort is normal.      Breath sounds: Normal breath sounds.   Musculoskeletal:         General: Normal range of motion.      Cervical back: Normal range of motion.      Lumbar back: Spasms and tenderness present.      Comments: + Left FABERs  + tenderness to palpation over left SI joint   Skin:     General: Skin is warm and dry.      Capillary Refill: Capillary refill takes less than 2 seconds.   Neurological:      General: No focal deficit present.      Mental Status: He is alert and oriented to person, place, and time.      Gait: Gait abnormal (slowed).   Psychiatric:         Mood and Affect: Mood normal.  "        Behavior: Behavior normal.         Thought Content: Thought content normal.         Cognition and Memory: Cognition normal.       Assessment & Plan   Diagnoses and all orders for this visit:    1. Lumbar radiculopathy (Primary)    2. Spinal stenosis of lumbar region with neurogenic claudication    3. Left-sided low back pain with left-sided sciatica, unspecified chronicity    --- Left L5 & S1 TF TOM   ---  Indications for epidural injection:  Plan is to proceed with epidural at the appropriate level.  If the patient receives significant pain reduction and improvement in function and the plan will be to repeat the epidural when the pain worsens.  If a second epidural provides at least 6 weeks of sustained improvement that includes both pain reduction and improvement in function then an epidural injection could be repeated once again at the same level.  This is a mutual decision between the clinician and the patient that includes discussions including risks and benefits in detail as well as alternative therapies.  Patient's questions were answered to their satisfaction and to their understanding.  ---  Discussed with the patient that sedation is optional for this procedure.  The sedation offered is called conscious sedation which is different from general anesthesia that is utilized in surgical procedures. The dosing of the sedation is determined by the physician and they will be monitored throughout the procedure. With conscious sedation it is possible to remember parts or all of the procedure, this is normal. They will need to have a  with them as driving is prohibited following conscious sedation.      NPO instructions for conscious sedation:  --- Do not eat 6 hours prior to the procedure.   --- Do not drink any dairy or citrus 4 hours prior to the procedure.   --- Do not drink anything, including clear liquids, 2 hours prior to procedure.      If the NPO instructions are not followed then the  procedure may be performed without sedation or the procedure will need to be rescheduled.   --- Suggested AA to help stop drinking but patient declined at this time.  --- Follow-up for procedure     YUNIEL REPORT  As the clinician, I personally reviewed the YUNIEL from 5/9/23 while the patient was in the office today.    Dictated utilizing Dragon dictation.

## 2023-05-09 NOTE — PATIENT INSTRUCTIONS
---  Indications for epidural injection:  Plan is to proceed with epidural at the appropriate level.  If the patient receives significant pain reduction and improvement in function and the plan will be to repeat the epidural when the pain worsens.  If a second epidural provides at least 6 weeks of sustained improvement that includes both pain reduction and improvement in function then an epidural injection could be repeated once again at the same level.  This is a mutual decision between the clinician and the patient that includes discussions including risks and benefits in detail as well as alternative therapies.  Patient's questions were answered to their satisfaction and to their understanding.  ---  Discussed with the patient that sedation is optional for this procedure.  The sedation offered is called conscious sedation which is different from general anesthesia that is utilized in surgical procedures. The dosing of the sedation is determined by the physician and they will be monitored throughout the procedure. With conscious sedation it is possible to remember parts or all of the procedure, this is normal. They will need to have a  with them as driving is prohibited following conscious sedation.      NPO instructions for conscious sedation:  --- Do not eat 6 hours prior to the procedure.   --- Do not drink any dairy or citrus 4 hours prior to the procedure.   --- Do not drink anything, including clear liquids, 2 hours prior to procedure.      If the NPO instructions are not followed then the procedure may be performed without sedation or the procedure will need to be rescheduled.

## 2023-05-23 ENCOUNTER — TRANSCRIBE ORDERS (OUTPATIENT)
Dept: SURGERY | Facility: SURGERY CENTER | Age: 73
End: 2023-05-23
Payer: MEDICARE

## 2023-05-23 DIAGNOSIS — Z41.9 SURGERY, ELECTIVE: Primary | ICD-10-CM

## 2023-07-27 ENCOUNTER — PREP FOR SURGERY (OUTPATIENT)
Dept: SURGERY | Facility: SURGERY CENTER | Age: 73
End: 2023-07-27
Payer: MEDICARE

## 2023-07-27 ENCOUNTER — OFFICE VISIT (OUTPATIENT)
Dept: PAIN MEDICINE | Facility: CLINIC | Age: 73
End: 2023-07-27
Payer: MEDICARE

## 2023-07-27 VITALS
RESPIRATION RATE: 12 BRPM | BODY MASS INDEX: 20 KG/M2 | OXYGEN SATURATION: 97 % | DIASTOLIC BLOOD PRESSURE: 60 MMHG | SYSTOLIC BLOOD PRESSURE: 138 MMHG | HEART RATE: 63 BPM | WEIGHT: 132 LBS | TEMPERATURE: 98 F | HEIGHT: 68 IN

## 2023-07-27 DIAGNOSIS — M54.16 LUMBAR RADICULOPATHY: Primary | ICD-10-CM

## 2023-07-27 DIAGNOSIS — M79.18 PIRIFORMIS MUSCLE PAIN: ICD-10-CM

## 2023-07-27 DIAGNOSIS — M46.1 SACROILIITIS: ICD-10-CM

## 2023-07-27 DIAGNOSIS — F10.20 ALCOHOLISM: ICD-10-CM

## 2023-07-27 DIAGNOSIS — M48.062 SPINAL STENOSIS OF LUMBAR REGION WITH NEUROGENIC CLAUDICATION: ICD-10-CM

## 2023-07-27 DIAGNOSIS — M54.42 LEFT-SIDED LOW BACK PAIN WITH LEFT-SIDED SCIATICA, UNSPECIFIED CHRONICITY: ICD-10-CM

## 2023-07-27 PROCEDURE — 1159F MED LIST DOCD IN RCRD: CPT

## 2023-07-27 PROCEDURE — 3075F SYST BP GE 130 - 139MM HG: CPT

## 2023-07-27 PROCEDURE — 1125F AMNT PAIN NOTED PAIN PRSNT: CPT

## 2023-07-27 PROCEDURE — 1160F RVW MEDS BY RX/DR IN RCRD: CPT

## 2023-07-27 PROCEDURE — 3078F DIAST BP <80 MM HG: CPT

## 2023-07-27 PROCEDURE — 99213 OFFICE O/P EST LOW 20 MIN: CPT

## 2023-07-27 RX ORDER — DIAZEPAM 5 MG/1
10 TABLET ORAL EVERY 6 HOURS PRN
OUTPATIENT
Start: 2023-07-27 | End: 2023-08-03

## 2023-07-27 NOTE — PROGRESS NOTES
"CHIEF COMPLAINT  Back and left leg pain    Subjective   Jose Villafuerte is a 73 y.o. male  who presents to the office for follow-up of procedure.  He completed a Left L5 & S1 TF TOM on 7/6/23 performed by Dr. Corcoran for management of low back and left leg pain. Patient reports 75% ongoing relief from the procedure. He reports that he has been able to\"live better\" since the procedure.     Today pain is 6/10VAS in severity. Pain is located in his left buttock and radiates down left posterior leg. Denies radicular pain to right leg. Describes this pain as a nearly continuous aching and burning. Pain is worsened by prolonged standing or sitting. Pain improves with rest/reposition. Patient reports that he uses the treadmill and an inversion table when able.     Patient has been evaluated by Dr. Mackey in the past who recommended a left L5-S1 laminectomy, foraminotomies, and medial facetectomy with Metrix.  Dr. Mackey declined moving forward with operative intervention secondary to patient's history of alcohol abuse.  Patient notes that he continues to drink 8 beers per day. He notes that this makes him feel better overall.       Continues to take Aspirin 81mg for atherosclerosis of the heart.      Procedures:  7/6/23 - Left L5 & S1 TF TOM - 75% ongoing relief  12/13/23 - Left SI joint and left piriformis injection - 50% relief x 6 weeks   9/7/22 - Left Piriformis injection - 50% relief x 4 weeks  5/25/22 - Left L2 & L5 TF TOM      Back Pain  This is a chronic problem. The current episode started more than 1 year ago. The problem occurs constantly. The problem has been waxing and waning since onset. The pain is present in the lumbar spine, sacro-iliac and gluteal. The quality of the pain is described as aching and burning. The pain radiates to the left thigh (Radiates into left buttock and down left posterior leg stopping at the knee). The pain is at a severity of 6/10. The pain is moderate. The symptoms are aggravated " by standing, sitting and position (prolonged position). Pertinent negatives include no abdominal pain, chest pain, dysuria, fever, headaches, numbness or weakness. He has tried bed rest, heat, ice, walking, NSAIDs and muscle relaxant for the symptoms.      PEG Assessment   What number best describes your pain on average in the past week?6  What number best describes how, during the past week, pain has interfered with your enjoyment of life?0  What number best describes how, during the past week, pain has interfered with your general activity?  0    Review of Pertinent Medical Data ---  MRI OF THE LUMBAR SPINE WITHOUT CONTRAST     CLINICAL HISTORY: Low back pain. Neurogenic claudication.     TECHNIQUE: MRI of the lumbar spine was obtained with sagittal T1,  proton-density, and T2-weighted images. Additionally, there are axial T1  and T2-weighted images through the lumbar spine.     COMPARISON: Comparison is made to previous MRI lumbar of the spine dated  02/04/2021.     FINDINGS:     The conus medullaris terminates at the level of the L1-2 interspace and  has normal signal intensity. The visualized distal thoracic spinal cord  is unremarkable in appearance.     Again noted is a mild chronic compression deformity at the L1 level with  approximately 30-35% loss of vertebral body height within the maximally  compressed portion of the vertebra excluding the Schmorl's node within  the superior endplate. When compared to the prior study, there is no  significant interval change in the degree of vertebral body height loss.     At L1-2 and L2-3, there is no significant degree of canal or foraminal  narrowing.     At L3-4, there is minimal bulging disc material. There is a posterior  annular fissure. There is degenerative retrolisthesis of L3 on L4 by  approximately 1 to 2 mm. There is minimal canal and foraminal narrowing  secondary to bulging disc material. No significant interval change is  seen in these findings when  compared to the prior study. There is mild  degenerative endplate marrow edema within the anterior and superior  endplate of L4 which is more pronounced when compared to the prior  study.     At L4-5, there is bulging disc material with a posterior annular  fissure. Mild facet arthropathy is seen. Minimal left foraminal  narrowing is seen secondary to bulging disc material. There is no  significant degree of canal narrowing. Similar findings were noted on  the prior exam.     At L5-S1, mild facet arthropathy is seen. Degenerative retrolisthesis of  L5 on S1 by approximately 3 mm is identified. A disc osteophyte complex  results in a mild-to-moderate degree of right and a mild degree of left  foraminal narrowing. The disc osteophyte complex also minimally indents  the ventral subarachnoid space and abuts the descending right S1 nerve  root sleeve just as it exits the thecal sac. No significant interval  change is seen when compared to the prior exam.     IMPRESSION:     1. Stable findings when compared to the prior study dated 02/04/2021.     2. There is a mild chronic compression deformity at the L1 level with  approximately 30-35% loss of vertebral body height within the maximally  compressed portion of the vertebra.     3. Multilevel relatively mild foraminal narrowing and minimal canal  narrowing are as discussed in detail above. The most prominent foraminal  stenosis is identified within the right L5-S1 neural foramen where there  is a mild-to-moderate degree of foraminal stenosis secondary to a disc  osteophyte complex.     4. The remaining multilevel degenerative changes are as discussed in  detail above.     This report was finalized on 8/9/2022 7:00 AM by Dr. Zach Singh M.D.    The following portions of the patient's history were reviewed and updated as appropriate: allergies, current medications, past family history, past medical history, past social history, past surgical history, and problem  "list.    Review of Systems   Constitutional:  Negative for activity change, fatigue and fever.   Respiratory:  Negative for cough and chest tightness.    Cardiovascular:  Negative for chest pain.   Gastrointestinal:  Negative for abdominal pain, constipation and diarrhea.   Genitourinary:  Negative for difficulty urinating and dysuria.   Musculoskeletal:  Positive for back pain.   Neurological:  Negative for dizziness, weakness, light-headedness, numbness and headaches.   Psychiatric/Behavioral:  Negative for agitation, sleep disturbance and suicidal ideas. The patient is not nervous/anxious.      I have reviewed and confirmed the accuracy of the ROS as documented by the MA/LPN/RN JENNIFER Wade     Vitals:    07/27/23 1021   BP: 138/60   BP Location: Left arm   Patient Position: Sitting   Cuff Size: Large Adult   Pulse: 63   Resp: 12   Temp: 98 °F (36.7 °C)   TempSrc: Temporal   SpO2: 97%   Weight: 59.9 kg (132 lb)   Height: 172.7 cm (68\")   PainSc:   6     Objective   Physical Exam  Constitutional:       Appearance: Normal appearance.   HENT:      Head: Normocephalic.   Cardiovascular:      Rate and Rhythm: Normal rate and regular rhythm.   Pulmonary:      Effort: Pulmonary effort is normal.      Breath sounds: Normal breath sounds.   Musculoskeletal:         General: Normal range of motion.      Cervical back: Normal range of motion.      Lumbar back: Spasms and tenderness present. Positive left straight leg raise test.   Skin:     General: Skin is warm and dry.      Capillary Refill: Capillary refill takes less than 2 seconds.   Neurological:      General: No focal deficit present.      Mental Status: He is alert and oriented to person, place, and time.      Gait: Gait abnormal (slowed).   Psychiatric:         Mood and Affect: Mood normal.         Behavior: Behavior normal.         Thought Content: Thought content normal.         Cognition and Memory: Cognition normal.     Assessment & Plan   Diagnoses " and all orders for this visit:    1. Lumbar radiculopathy (Primary)    2. Spinal stenosis of lumbar region with neurogenic claudication    3. Left-sided low back pain with left-sided sciatica, unspecified chronicity    4. Sacroiliitis    5. Piriformis muscle pain    6. Alcoholism    --- Repeat Left L5 & S1 TF TOM every 3 months. Will schedule patient for repeat procedure at this time. Patient will cancel or reschedule if not needed.   ---  Indications for epidural injection:  Plan is to proceed with epidural at the appropriate level.  If the patient receives significant pain reduction and improvement in function and the plan will be to repeat the epidural when the pain worsens.  If a second epidural provides at least 6 weeks of sustained improvement that includes both pain reduction and improvement in function then an epidural injection could be repeated once again at the same level.  This is a mutual decision between the clinician and the patient that includes discussions including risks and benefits in detail as well as alternative therapies.  Patient's questions were answered to their satisfaction and to their understanding.  ---  Discussed with the patient that sedation is optional for this procedure.  The sedation offered is called conscious sedation which is different from general anesthesia that is utilized in surgical procedures. The dosing of the sedation is determined by the physician and they will be monitored throughout the procedure. With conscious sedation it is possible to remember parts or all of the procedure, this is normal. They will need to have a  with them as driving is prohibited following conscious sedation.      NPO instructions for conscious sedation:  --- Do not eat 6 hours prior to the procedure.   --- Do not drink any dairy or citrus 4 hours prior to the procedure.   --- Do not drink anything, including clear liquids, 2 hours prior to procedure.      If the NPO instructions are not  followed then the procedure may be performed without sedation or the procedure will need to be rescheduled.   --- Discussed with patient the importance of cutting back on his drinking and to make sure he does not drive while drinking. Patient verbalized understanding.   --- Follow up for procedure    Jose Villafuerte reports a pain score of 6.  Given his pain assessment as noted, treatment options were discussed and the following options were decided upon as a follow-up plan to address the patient's pain:  Repeat TF TOM as needed .       YUNIEL REPORT  As the clinician, I personally reviewed the YUNIEL from 7/27/23 while the patient was in the office today.    Dictated utilizing Dragon dictation.

## 2023-08-08 ENCOUNTER — TELEPHONE (OUTPATIENT)
Dept: PAIN MEDICINE | Facility: CLINIC | Age: 73
End: 2023-08-08

## 2023-08-08 NOTE — TELEPHONE ENCOUNTER
Caller: Jose Villafuerte    Relationship to patient: Self    Best call back number: [phone]    Patient is needing: PATIENT STATES HE HAS AN INJECTION SCHEDULE FOR 10.10.23 AND WOULD LIKE TO RESCHEDULE

## 2023-09-08 ENCOUNTER — TRANSCRIBE ORDERS (OUTPATIENT)
Dept: SURGERY | Facility: SURGERY CENTER | Age: 73
End: 2023-09-08
Payer: MEDICARE

## 2023-09-08 DIAGNOSIS — Z41.9 SURGERY, ELECTIVE: Primary | ICD-10-CM

## 2023-10-03 ENCOUNTER — HOSPITAL ENCOUNTER (OUTPATIENT)
Facility: SURGERY CENTER | Age: 73
Setting detail: HOSPITAL OUTPATIENT SURGERY
Discharge: HOME | End: 2023-10-03
Attending: ANESTHESIOLOGY | Admitting: ANESTHESIOLOGY
Payer: MEDICARE

## 2023-10-03 ENCOUNTER — HOSPITAL ENCOUNTER (OUTPATIENT)
Dept: GENERAL RADIOLOGY | Facility: SURGERY CENTER | Age: 73
Setting detail: HOSPITAL OUTPATIENT SURGERY
End: 2023-10-03
Payer: MEDICARE

## 2023-10-03 VITALS
WEIGHT: 135 LBS | HEART RATE: 77 BPM | HEIGHT: 68 IN | SYSTOLIC BLOOD PRESSURE: 134 MMHG | DIASTOLIC BLOOD PRESSURE: 82 MMHG | OXYGEN SATURATION: 96 % | RESPIRATION RATE: 20 BRPM | BODY MASS INDEX: 20.46 KG/M2 | TEMPERATURE: 98 F

## 2023-10-03 DIAGNOSIS — M54.16 LUMBAR RADICULOPATHY: ICD-10-CM

## 2023-10-03 DIAGNOSIS — Z41.9 SURGERY, ELECTIVE: ICD-10-CM

## 2023-10-03 PROCEDURE — 77002 NEEDLE LOCALIZATION BY XRAY: CPT

## 2023-10-03 PROCEDURE — 25510000001 IOPAMIDOL 61 % SOLUTION 30 ML VIAL: Performed by: ANESTHESIOLOGY

## 2023-10-03 PROCEDURE — 76000 FLUOROSCOPY <1 HR PHYS/QHP: CPT

## 2023-10-03 PROCEDURE — 64483 NJX AA&/STRD TFRM EPI L/S 1: CPT | Performed by: ANESTHESIOLOGY

## 2023-10-03 PROCEDURE — 25010000002 DEXAMETHASONE SODIUM PHOSPHATE 100 MG/10ML SOLUTION 10 ML VIAL: Performed by: ANESTHESIOLOGY

## 2023-10-03 PROCEDURE — 64484 NJX AA&/STRD TFRM EPI L/S EA: CPT | Performed by: ANESTHESIOLOGY

## 2023-10-03 PROCEDURE — 25010000002 BUPIVACAINE (PF) 0.5 % SOLUTION 10 ML VIAL: Performed by: ANESTHESIOLOGY

## 2023-10-03 RX ORDER — DIAZEPAM 5 MG/1
10 TABLET ORAL EVERY 6 HOURS PRN
Status: DISCONTINUED | OUTPATIENT
Start: 2023-10-03 | End: 2023-10-03 | Stop reason: HOSPADM

## 2023-10-03 RX ADMIN — DIAZEPAM 10 MG: 5 TABLET ORAL at 10:43

## 2023-10-03 NOTE — OP NOTE
Lumbar Transforaminal Epidural Steroid Injection (two levels)  [facility]      PREOPERATIVE DIAGNOSIS:  left Lumbar Radiculopathy    POSTOPERATIVE DIAGNOSIS:  Same as preop diagnosis    PROCEDURE:    1. CPT 93736 --  Diagnostic Transforaminal Epidural Steroid Injection at the L5 level, on the left   2. CPT 51870 --  Diagnostic Transforaminal Epidural Steroid Injection at the S1 level, on the left     PRE-PROCEDURE DISCUSSION WITH PATIENT:    Risks and complications were discussed with the patient prior to starting the procedure and informed consent was obtained.  We discussed various topics including but not limited to bleeding, infection, injury, nerve injury, paralysis, coma, death, postprocedural painful flare-up, postprocedural site soreness, and a lack of pain relief.  We discussed the diagnostic aspect of transforaminal epidural / selective nerve root blockade.    SURGEON:  Steven Corcoran MD    REASON FOR PROCEDURE:    Degenerative changes are noted in the area. and Radiating pattern of pain is likely consistent with degenerative changes in the area.    SEDATION:  The patient had higher than average levels of procedural anxiety and the need to provide additional procedural sedation was needed to safely proceed. and he received diazepam 10 mg in the preoperative area.  He was conversational and alert but comfortable throughout the procedure.  ANESTHETIC:  Marcaine 0.25%  STEROID:  Methylprednisolone (DEPO MEDROL) 80mg/ml    DESCRIPTON OF PROCEDURE:  After obtaining informed consent, an I.V. was not started in the preoperative area. The patient taken to the operating room and was placed in the prone position with a pillow under the abdomen.  All pressure points were well padded.  EKG, blood pressure, and pulse oximeter were monitored.  The lumbar area was prepped with Chloraprep and draped in a sterile fashion. Under fluoroscopic guidance in an oblique dimension on the above mentioned side,  the transverse process of the first aforementioned vertebra at the junction of the body at 6 o'clock position was identified. Skin and subcutaneous tissue was anesthetized with 1% lidocaine. A 22-gauge spinal needle was introduced under fluoroscopic guidance at the above junction into the foramen without parasthesias and into the epidural space. After confirming the position of the needle with PA, lateral, and oblique fluoroscopic views, aspiration was checked and was clear of blood or CSF.  Next, 1 mL of Omnipaque was injected. After seeing adequate spread on the corresponding nerve root, a total volume 2mL of injectate containing local anesthetic as above and half of the above mentioned corticosteroid was injected into the epidural space.  The needle was removed intact.      Next, in similar fashion, the second level mentioned above was addressed and a similar amount of injectate was delivered after similar imaging was achieved.      Vital signs were stable throughout.          ESTIMATED BLOOD LOSS:  <5 mL  SPECIMENS:  none    COMPLICATIONS:   No complications were noted., There was no indication of vascular uptake on live injection of contrast dye., and There was no indication of intrathecal uptake on live injection of contrast dye.    TOLERANCE & DISCHARGE CONDITION:    The patient tolerated the procedure well.  The patient was transported to the recovery area without difficulties.  The patient was discharged to home under the care of family in stable and satisfactory condition.    PLAN OF CARE:  The patient was given our standard instruction sheet.  The patient will Return to clinic 4-6 wks.  The patient will resume all medications as per the medication reconciliation sheet.
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
## 2023-10-03 NOTE — DISCHARGE INSTRUCTIONS
[facility] - Post-procedure Instructions          --  While there are no absolute restrictions, it is recommended that you do not perform strenuous activity today. In the morning, you may resume your level of activity as before your block.    --  If you have a band-aid at your injection site, please remove it later today. Observe the area for any redness, swelling, pus-like drainage, or a temperature over 101°. If any of these symptoms occur, please call your doctor at [phone]. If after office hours, leave a message and the on-call provider will return your call.    --  Ice may be applied to your injection site. It is recommended you avoid direct heat (heating pad; hot tub) for 1-2 days.    --  Call [facility] at [phone] if you experience persistent headache, persistent bleeding from the injection site, or severe pain not relieved by heat or oral medication.    --  Do not make important decisions today.    --  Due to the effects of the block and/or the I.V. Sedation, DO NOT drive or operate hazardous machinery for 12 hours.  Local anesthetics may cause numbness after procedure and precautions must be taken with regards to operating equipment as well as with walking, even if ambulating with assistance of another person or with an assistive device.    --  Do not drink alcohol for 12 hours.    -- You may return to work tomorrow, or as directed by your referring doctor.    --  Occasionally you may notice a slight increase in your pain after the procedure. This should start to improve within the next 24-48 hours. Radiofrequency ablation procedure pain may last 3-4 weeks.    --  It may take as long as 3-4 days before you notice a gradual improvement in your pain and/or other symptoms.    -- You may continue to take your prescribed pain medication as needed.    --  Some normal possible side effects of steroid use could include fluid retention, increased blood sugar, dull headache,  increased sweating, increased appetite, mood swings and flushing.    --  Diabetics are recommended to watch their blood glucose level closely for 24-48 hours after the injection.    --  Must stay in PACU for 20 min upon arrival and prove no leg weakness before being discharged.    --  IN THE EVENT OF A LIFE THREATENING EMERGENCY, (CHEST PAIN, BREATHING DIFFICULTIES, PARALYSIS…) YOU SHOULD GO TO YOUR NEAREST EMERGENCY ROOM.    --  You should be contacted by our office within 2-3 days to schedule follow up or next appointment date.  If not contacted within 7 days, please call the office at [phone]

## 2023-11-10 ENCOUNTER — OFFICE VISIT (OUTPATIENT)
Dept: INTERNAL MEDICINE | Facility: CLINIC | Age: 73
End: 2023-11-10
Payer: MEDICARE

## 2023-11-10 VITALS
WEIGHT: 136 LBS | HEIGHT: 68 IN | RESPIRATION RATE: 18 BRPM | DIASTOLIC BLOOD PRESSURE: 73 MMHG | HEART RATE: 78 BPM | BODY MASS INDEX: 20.61 KG/M2 | SYSTOLIC BLOOD PRESSURE: 150 MMHG

## 2023-11-10 DIAGNOSIS — F17.210 CIGARETTE NICOTINE DEPENDENCE WITHOUT COMPLICATION: Chronic | ICD-10-CM

## 2023-11-10 DIAGNOSIS — Z00.00 MEDICARE ANNUAL WELLNESS VISIT, SUBSEQUENT: Primary | ICD-10-CM

## 2023-11-10 DIAGNOSIS — Z12.5 SCREENING FOR PROSTATE CANCER: ICD-10-CM

## 2023-11-10 DIAGNOSIS — I25.10 ATHEROSCLEROSIS OF NATIVE CORONARY ARTERY OF NATIVE HEART WITHOUT ANGINA PECTORIS: Chronic | ICD-10-CM

## 2023-11-10 DIAGNOSIS — I10 ESSENTIAL HYPERTENSION: Chronic | ICD-10-CM

## 2023-11-10 DIAGNOSIS — F10.20 UNCOMPLICATED ALCOHOL DEPENDENCE: Chronic | ICD-10-CM

## 2023-11-10 PROBLEM — Z12.11 ENCOUNTER FOR SCREENING FOR MALIGNANT NEOPLASM OF COLON: Status: RESOLVED | Noted: 2021-05-06 | Resolved: 2023-11-10

## 2023-11-10 LAB
BUN SERPL-MCNC: 4 MG/DL (ref 8–23)
BUN/CREAT SERPL: 7.5 (ref 7–25)
CALCIUM SERPL-MCNC: 9.9 MG/DL (ref 8.6–10.5)
CHLORIDE SERPL-SCNC: 93 MMOL/L (ref 98–107)
CHOLEST SERPL-MCNC: 173 MG/DL (ref 0–200)
CO2 SERPL-SCNC: 28.2 MMOL/L (ref 22–29)
CREAT SERPL-MCNC: 0.53 MG/DL (ref 0.76–1.27)
EGFRCR SERPLBLD CKD-EPI 2021: 105.8 ML/MIN/1.73
GLUCOSE SERPL-MCNC: 100 MG/DL (ref 65–99)
HDLC SERPL-MCNC: 71 MG/DL (ref 40–60)
LDLC SERPL CALC-MCNC: 91 MG/DL (ref 0–100)
LDLC/HDLC SERPL: 1.28 {RATIO}
POTASSIUM SERPL-SCNC: 4.8 MMOL/L (ref 3.5–5.2)
PSA SERPL-MCNC: 2.48 NG/ML (ref 0–4)
SODIUM SERPL-SCNC: 130 MMOL/L (ref 136–145)
TRIGL SERPL-MCNC: 57 MG/DL (ref 0–150)
VLDLC SERPL CALC-MCNC: 11 MG/DL (ref 5–40)

## 2023-11-10 RX ORDER — LISINOPRIL 40 MG/1
40 TABLET ORAL DAILY
Qty: 90 TABLET | Refills: 4 | Status: SHIPPED | OUTPATIENT
Start: 2023-11-10

## 2023-11-10 RX ORDER — AMLODIPINE BESYLATE 10 MG/1
10 TABLET ORAL DAILY
Qty: 90 TABLET | Refills: 4 | Status: SHIPPED | OUTPATIENT
Start: 2023-11-10

## 2023-11-10 NOTE — PROGRESS NOTES
The ABCs of the Annual Wellness Visit  Subsequent Medicare Wellness Visit    Subjective    Jose Villafuerte is a 73 y.o. male who presents for a Subsequent Medicare Wellness Visit.    The following portions of the patient's history were reviewed and   updated as appropriate: allergies, current medications, past family history, past medical history, past social history, past surgical history, and problem list.    Compared to one year ago, the patient feels his physical   health is the same.    Compared to one year ago, the patient feels his mental   health is the same.    Recent Hospitalizations:  He was not admitted to the hospital during the last year.       Current Medical Providers:  Patient Care Team:  John Etienne MD as PCP - General (Family Medicine)    Outpatient Medications Prior to Visit   Medication Sig Dispense Refill    aspirin 81 MG EC tablet Take 1 tablet by mouth Daily.      B Complex Vitamins (B COMPLEX 1 PO) Take 1 tablet by mouth. TAKES EVERY OTHER DAY      Fexofenadine HCl (ALLERGY 24-HR PO) Take  by mouth.      amLODIPine (NORVASC) 10 MG tablet Take 1 tablet by mouth Daily. 90 tablet 4    lisinopril (PRINIVIL,ZESTRIL) 40 MG tablet Take 1 tablet by mouth Daily. 90 tablet 4     No facility-administered medications prior to visit.       No opioid medication identified on active medication list. I have reviewed chart for other potential  high risk medication/s and harmful drug interactions in the elderly.        Aspirin is on active medication list. Aspirin use is indicated based on review of current medical condition/s. Pros and cons of this therapy have been discussed today. Benefits of this medication outweigh potential harm.  Patient has been encouraged to continue taking this medication.  .      Patient Active Problem List   Diagnosis    Uncomplicated alcohol dependence    Cigarette nicotine dependence without complication    Spinal stenosis of lumbar region with neurogenic claudication     "Essential hypertension    Other chronic pain    Piriformis muscle pain    Panlobular emphysema    Lumbar radiculopathy    Back pain    Sacroiliitis    Pulmonary nodules    Atherosclerosis of native coronary artery of native heart without angina pectoris    Aortic atherosclerosis     Advance Care Planning   Advance Care Planning     Advance Directive is on file.  ACP discussion was held with the patient during this visit. Patient has an advance directive in EMR which is still valid.      Objective    Vitals:    11/10/23 1037   BP: 150/73   BP Location: Left arm   Patient Position: Sitting   Cuff Size: Small Adult   Pulse: 78   Resp: 18   Weight: 61.7 kg (136 lb)   Height: 172.7 cm (68\")     Estimated body mass index is 20.68 kg/m² as calculated from the following:    Height as of this encounter: 172.7 cm (68\").    Weight as of this encounter: 61.7 kg (136 lb).    BMI is within normal parameters. No other follow-up for BMI required.      Does the patient have evidence of cognitive impairment? No          HEALTH RISK ASSESSMENT    Smoking Status:  Social History     Tobacco Use   Smoking Status Every Day    Packs/day: 2.00    Years: 45.00    Additional pack years: 0.00    Total pack years: 90.00    Types: Cigarettes    Start date:    Smokeless Tobacco Never     Alcohol Consumption:  Social History     Substance and Sexual Activity   Alcohol Use Yes    Alcohol/week: 55.0 standard drinks of alcohol    Types: 55 Cans of beer per week    Comment: Daily-7-8 beers and trying to decrease     Fall Risk Screen:    NICOLLEADI Fall Risk Assessment was completed, and patient is at LOW risk for falls.Assessment completed on:11/10/2023    Depression Screenin/10/2023    10:38 AM   PHQ-2/PHQ-9 Depression Screening   Little Interest or Pleasure in Doing Things 0-->not at all   Feeling Down, Depressed or Hopeless 0-->not at all   PHQ-9: Brief Depression Severity Measure Score 0       Health Habits and Functional and Cognitive " Screenin/10/2023    10:38 AM   Functional & Cognitive Status   Do you have difficulty preparing food and eating? No   Do you have difficulty bathing yourself, getting dressed or grooming yourself? No   Do you have difficulty using the toilet? No   Do you have difficulty moving around from place to place? No   Do you have trouble with steps or getting out of a bed or a chair? No   Current Diet Well Balanced Diet   Dental Exam Up to date   Eye Exam Not up to date   Exercise (times per week) 2 times per week   Current Exercises Include Walking   Do you need help using the phone?  No   Are you deaf or do you have serious difficulty hearing?  No   Do you need help to go to places out of walking distance? No   Do you need help shopping? No   Do you need help preparing meals?  No   Do you need help with housework?  No   Do you need help with laundry? No   Do you need help taking your medications? No   Do you need help managing money? No   Do you ever drive or ride in a car without wearing a seat belt? No   Have you felt unusual stress, anger or loneliness in the last month? No   Who do you live with? Spouse   If you need help, do you have trouble finding someone available to you? No   Have you been bothered in the last four weeks by sexual problems? No   Do you have difficulty concentrating, remembering or making decisions? No       Age-appropriate Screening Schedule:  Refer to the list below for future screening recommendations based on patient's age, sex and/or medical conditions. Orders for these recommended tests are listed in the plan section. The patient has been provided with a written plan.    Health Maintenance   Topic Date Due    ZOSTER VACCINE (2 of 3) 2013    Pneumococcal Vaccine 65+ (3 - PPSV23 or PCV20) 2018    COVID-19 Vaccine ( season) 2023    ANNUAL WELLNESS VISIT  10/28/2023    LUNG CANCER SCREENING  2024    COLORECTAL CANCER SCREENING  2031    TDAP/TD  VACCINES (2 - Td or Tdap) 10/14/2031    HEPATITIS C SCREENING  Completed    AAA SCREEN (ONE-TIME)  Completed    INFLUENZA VACCINE  Discontinued                  CMS Preventative Services Quick Reference  Risk Factors Identified During Encounter  Immunizations Discussed/Encouraged: Influenza, Shingrix, and COVID19  Inactivity/Sedentary: Patient was advised to exercise at least 150 minutes a week per CDC recommendations.  The above risks/problems have been discussed with the patient.  Pertinent information has been shared with the patient in the After Visit Summary.  An After Visit Summary and PPPS were made available to the patient.    Follow Up:   Next Medicare Wellness visit to be scheduled in 1 year.       Additional E&M Note during same encounter follows:  Patient has multiple medical problems which are significant and separately identifiable that require additional work above and beyond the Medicare Wellness Visit.      Chief Complaint  Medicare Wellness-subsequent    Subjective        HPI  Jose Villafuerte is also being seen today for hypertension and aortic atherosclerosis.  Blood pressure is controlled in the office today.  He is taking his blood pressure medication as below as prescribed.  He is due for lab rechecks today.  Trying to control the cholesterol with diet for the aortic atherosclerosis.  Goal LDL less than 100.  He is working on decreasing drinking alcohol and at 7 drinks a day and smoking about 2.5 packs per day.          Current Outpatient Medications:     amLODIPine (NORVASC) 10 MG tablet, Take 1 tablet by mouth Daily., Disp: 90 tablet, Rfl: 4    aspirin 81 MG EC tablet, Take 1 tablet by mouth Daily., Disp: , Rfl:     B Complex Vitamins (B COMPLEX 1 PO), Take 1 tablet by mouth. TAKES EVERY OTHER DAY, Disp: , Rfl:     Fexofenadine HCl (ALLERGY 24-HR PO), Take  by mouth., Disp: , Rfl:     lisinopril (PRINIVIL,ZESTRIL) 40 MG tablet, Take 1 tablet by mouth Daily., Disp: 90 tablet, Rfl: 4        "    Objective   Vital Signs:  /73 (BP Location: Left arm, Patient Position: Sitting, Cuff Size: Small Adult)   Pulse 78   Resp 18   Ht 172.7 cm (68\")   Wt 61.7 kg (136 lb)   BMI 20.68 kg/m²     Physical Exam  Vitals and nursing note reviewed.   Constitutional:       General: He is not in acute distress.     Appearance: Normal appearance.   Cardiovascular:      Rate and Rhythm: Normal rate and regular rhythm.      Heart sounds: Normal heart sounds. No murmur heard.  Pulmonary:      Effort: Pulmonary effort is normal.      Breath sounds: Normal breath sounds.   Neurological:      Mental Status: He is alert.          The following data was reviewed by: John Etienne MD on 11/10/2023:  Common labs          5/1/2023    11:13   Common Labs   Glucose 96    BUN 5    Creatinine 0.56    Sodium 132    Potassium 5.2    Chloride 94    Calcium 10.6    Total Protein 7.4    Albumin 4.5    Total Bilirubin 0.5    Alkaline Phosphatase 136    AST (SGOT) 27    ALT (SGPT) 22    WBC 8.95    Hemoglobin 15.3    Hematocrit 43.9    Platelets 446    Total Cholesterol 162    Triglycerides 51    HDL Cholesterol 66    LDL Cholesterol  85                 Assessment and Plan   Diagnoses and all orders for this visit:    1. Medicare annual wellness visit, subsequent (Primary)    2. Essential hypertension  -     Lipid Panel With LDL / HDL Ratio  -     Basic Metabolic Panel  -     amLODIPine (NORVASC) 10 MG tablet; Take 1 tablet by mouth Daily.  Dispense: 90 tablet; Refill: 4  -     lisinopril (PRINIVIL,ZESTRIL) 40 MG tablet; Take 1 tablet by mouth Daily.  Dispense: 90 tablet; Refill: 4    3. Atherosclerosis of native coronary artery of native heart without angina pectoris  -     Lipid Panel With LDL / HDL Ratio  -     Basic Metabolic Panel    4. Cigarette nicotine dependence without complication    5. Uncomplicated alcohol dependence    6. Screening for prostate cancer  -     PSA Screen      Clinically stable chronic conditions as " above.  Continue all medications as above.  Labs reviewed/ordered as above.  Recommended to continue working on decreasing the alcohol and tobacco.  If he is interested in medical detox, I can help him with that.  He just needs to make a follow-up for it.             Follow Up   Return in about 6 months (around 5/10/2024) for Next scheduled follow up.  Patient was given instructions and counseling regarding his condition or for health maintenance advice. Please see specific information pulled into the AVS if appropriate.

## 2023-11-10 NOTE — PATIENT INSTRUCTIONS
"MyChart Tips:    MyChart is a useful part of our patient care program and is a great way for us to communicate lab results and for you to request refills.  Not all medical questions are appropriate for MyChart such as new medical issues that require taking a history, performing an exam, getting labs/studies or researching medical questions needed to be addressed in the office.    Examples of medical issues that are APPROPRIATE for MyChart:  -Follow-up on problems we have already addressed in a visit such as home testing results, blood pressure readings, glucose readings  -Questions that can be answered with a simple \"yes\" or \"no\"    Communication that is NOT APPROPRIATE for MyChart:  -MyChart is not for new problems, serious problems or urgent problems.  Urgent matters should be addressed by phone, in the office, urgent care or the ER.  -[employer] messages are not email.  Staff will check messages each weekday.  We strive for a 48-hour turnaround on messaging.    -[employer] is not for private issues.  Messages are received first by our office staff.    Please allow up to 7 business days for lab results to be sent through [employer] to you before contacting your provider.  Sometimes we are waiting for results to get back from the lab and also your provider needs time to analyze them thoroughly before making recommendations.  While the internet has great resources, it is not a substitute for interpreting lab results.    We also ask that you not send [employer] messages and telephone calls regarding the same issue simultaneously.  This slows down the process of returning your call/message and confuses staff.    Be mindful that [employer] messages and telephone calls become part of your permanent medical record.    Lastly, your provider cannot access your [employer].  It is a service which communicates with the EHR (Electronic Health Record).  Sometimes there are errors in [employer] that staff and providers cannot see and these errors " are not part of your EHR.  Vaccines and screening reminders have been incorrect in MyChart.    We appreciate your respect for the limitations and boundaries of [facility].

## 2023-11-13 ENCOUNTER — TELEPHONE (OUTPATIENT)
Dept: INTERNAL MEDICINE | Facility: CLINIC | Age: 73
End: 2023-11-13
Payer: MEDICARE

## 2023-11-13 NOTE — TELEPHONE ENCOUNTER
----- Message from John Etienne MD sent at 11/13/2023  8:29 AM EST -----  Please let him know that most of his labs are stable but sodium is dropping.  This is likely due to the alcohol's effects.  He needs to continue weaning off alcohol and seriously consider following up with me soon to discuss a detox from it.  In the meantime, he may want to add a little salt to his food.  Please mail him a copy of the labs if he wishes.

## 2023-11-20 ENCOUNTER — OFFICE VISIT (OUTPATIENT)
Dept: PAIN MEDICINE | Facility: CLINIC | Age: 73
End: 2023-11-20
Payer: MEDICARE

## 2023-11-20 ENCOUNTER — PREP FOR SURGERY (OUTPATIENT)
Dept: SURGERY | Facility: SURGERY CENTER | Age: 73
End: 2023-11-20
Payer: MEDICARE

## 2023-11-20 VITALS
OXYGEN SATURATION: 96 % | HEART RATE: 82 BPM | BODY MASS INDEX: 20.4 KG/M2 | SYSTOLIC BLOOD PRESSURE: 150 MMHG | HEIGHT: 68 IN | RESPIRATION RATE: 18 BRPM | WEIGHT: 134.6 LBS | TEMPERATURE: 97.1 F | DIASTOLIC BLOOD PRESSURE: 70 MMHG

## 2023-11-20 DIAGNOSIS — M48.062 SPINAL STENOSIS OF LUMBAR REGION WITH NEUROGENIC CLAUDICATION: ICD-10-CM

## 2023-11-20 DIAGNOSIS — M54.42 LEFT-SIDED LOW BACK PAIN WITH LEFT-SIDED SCIATICA, UNSPECIFIED CHRONICITY: ICD-10-CM

## 2023-11-20 DIAGNOSIS — M54.16 LUMBAR RADICULOPATHY: Primary | ICD-10-CM

## 2023-11-20 PROCEDURE — 3078F DIAST BP <80 MM HG: CPT

## 2023-11-20 PROCEDURE — 1125F AMNT PAIN NOTED PAIN PRSNT: CPT

## 2023-11-20 PROCEDURE — 3077F SYST BP >= 140 MM HG: CPT

## 2023-11-20 PROCEDURE — 1159F MED LIST DOCD IN RCRD: CPT

## 2023-11-20 PROCEDURE — 1160F RVW MEDS BY RX/DR IN RCRD: CPT

## 2023-11-20 PROCEDURE — 99213 OFFICE O/P EST LOW 20 MIN: CPT

## 2023-11-20 RX ORDER — DIAZEPAM 5 MG/1
10 TABLET ORAL ONCE
OUTPATIENT
Start: 2023-11-20

## 2023-11-20 NOTE — PROGRESS NOTES
CHIEF COMPLAINT  Back pain    Subjective   Jose Villafuerte is a 73 y.o. male  who presents to the office for follow-up of procedure.  He completed a Left L5 & S1 TF LESI on 10/3/23 performed by Dr. Corcoran for management of back/leg pain. Patient reports 40% ongoing relief from the procedure. He reports pain has slightly worsened since his last office visit due to cold weather.     Today pain is 8/10VAS in severity. Pain is located in his left buttock and radiates down left posterior leg. Denies radicular pain to right leg. Describes this pain as a nearly continuous aching and burning. Pain is worsened by prolonged standing or sitting. Pain improves with rest/reposition. Patient reports that he uses the treadmill and an inversion table when able.     Patient has been evaluated by Dr. Mackey in the past who recommended a left L5-S1 laminectomy, foraminotomies, and medial facetectomy with Metrix. Dr. Mackey declined moving forward with operative intervention secondary to patient's history of alcohol abuse.  Patient notes that he continues to drink 8 beers per day. He notes that this makes him feel better overall.  He saw Dr. Etienne on 11/10/23 who encouraged patient to continue to work on decreasing alcohol and tobacco. Patient stated that Dr. Etienne mentioned medical detox but he is not interested at this time.      Continues to take Aspirin 81mg for aortic atherosclerosis.      Procedures:  10/3/23 - Left L5 & S1 TF LESI - 40% ongoing relief  7/6/23 - Left L5 & S1 TF TOM - 75% relief x 3 months  12/13/23 - Left SI joint and left piriformis injection - 50% relief x 6 weeks   9/7/22 - Left Piriformis injection - 50% relief x 4 weeks  5/25/22 - Left L2 & L5 TF TOM     Back Pain  This is a chronic problem. The current episode started more than 1 year ago. The problem occurs constantly. The problem has been waxing and waning since onset. The pain is present in the lumbar spine, sacro-iliac and gluteal. The quality of the  pain is described as aching and burning. The pain radiates to the left thigh (Radiates into left buttock and down left posterior leg stopping at the knee). The pain is at a severity of 8/10. The pain is moderate. The symptoms are aggravated by standing, sitting and position (prolonged position). Pertinent negatives include no abdominal pain, chest pain, dysuria, fever, headaches or weakness. He has tried bed rest, heat, ice, walking, NSAIDs and muscle relaxant for the symptoms.      PEG Assessment   What number best describes your pain on average in the past week?8  What number best describes how, during the past week, pain has interfered with your enjoyment of life?0  What number best describes how, during the past week, pain has interfered with your general activity?  0    Review of Pertinent Medical Data ---  MRI OF THE LUMBAR SPINE WITHOUT CONTRAST     CLINICAL HISTORY: Low back pain. Neurogenic claudication.     TECHNIQUE: MRI of the lumbar spine was obtained with sagittal T1,  proton-density, and T2-weighted images. Additionally, there are axial T1  and T2-weighted images through the lumbar spine.     COMPARISON: Comparison is made to previous MRI lumbar of the spine dated  02/04/2021.     FINDINGS:     The conus medullaris terminates at the level of the L1-2 interspace and  has normal signal intensity. The visualized distal thoracic spinal cord  is unremarkable in appearance.     Again noted is a mild chronic compression deformity at the L1 level with  approximately 30-35% loss of vertebral body height within the maximally  compressed portion of the vertebra excluding the Schmorl's node within  the superior endplate. When compared to the prior study, there is no  significant interval change in the degree of vertebral body height loss.     At L1-2 and L2-3, there is no significant degree of canal or foraminal  narrowing.     At L3-4, there is minimal bulging disc material. There is a posterior  annular  fissure. There is degenerative retrolisthesis of L3 on L4 by  approximately 1 to 2 mm. There is minimal canal and foraminal narrowing  secondary to bulging disc material. No significant interval change is  seen in these findings when compared to the prior study. There is mild  degenerative endplate marrow edema within the anterior and superior  endplate of L4 which is more pronounced when compared to the prior  study.     At L4-5, there is bulging disc material with a posterior annular  fissure. Mild facet arthropathy is seen. Minimal left foraminal  narrowing is seen secondary to bulging disc material. There is no  significant degree of canal narrowing. Similar findings were noted on  the prior exam.     At L5-S1, mild facet arthropathy is seen. Degenerative retrolisthesis of  L5 on S1 by approximately 3 mm is identified. A disc osteophyte complex  results in a mild-to-moderate degree of right and a mild degree of left  foraminal narrowing. The disc osteophyte complex also minimally indents  the ventral subarachnoid space and abuts the descending right S1 nerve  root sleeve just as it exits the thecal sac. No significant interval  change is seen when compared to the prior exam.     IMPRESSION:     1. Stable findings when compared to the prior study dated 02/04/2021.     2. There is a mild chronic compression deformity at the L1 level with  approximately 30-35% loss of vertebral body height within the maximally  compressed portion of the vertebra.     3. Multilevel relatively mild foraminal narrowing and minimal canal  narrowing are as discussed in detail above. The most prominent foraminal  stenosis is identified within the right L5-S1 neural foramen where there  is a mild-to-moderate degree of foraminal stenosis secondary to a disc  osteophyte complex.     4. The remaining multilevel degenerative changes are as discussed in  detail above.     This report was finalized on 8/9/2022 7:00 AM by Dr. Zach Singh,  "M.D.       The following portions of the patient's history were reviewed and updated as appropriate: allergies, current medications, past family history, past medical history, past social history, past surgical history, and problem list.    Review of Systems   Constitutional:  Negative for activity change, fatigue and fever.   Cardiovascular:  Negative for chest pain.   Gastrointestinal:  Negative for abdominal pain, constipation and diarrhea.   Genitourinary:  Negative for difficulty urinating and dysuria.   Musculoskeletal:  Positive for back pain.   Neurological:  Positive for light-headedness. Negative for dizziness, weakness and headaches.   Psychiatric/Behavioral:  Negative for agitation, sleep disturbance and suicidal ideas. The patient is not nervous/anxious.      I have reviewed and confirmed the accuracy of the ROS as documented by the MA/LPN/RN JENNIFER Wade     Vitals:    11/20/23 1021   BP: 150/70   BP Location: Left arm   Patient Position: Sitting   Cuff Size: Adult   Pulse: 82   Resp: 18   Temp: 97.1 °F (36.2 °C)   SpO2: 96%   Weight: 61.1 kg (134 lb 9.6 oz)   Height: 172.7 cm (68\")   PainSc:   8     Objective   Physical Exam  Constitutional:       Appearance: Normal appearance.   HENT:      Head: Normocephalic.   Cardiovascular:      Rate and Rhythm: Normal rate and regular rhythm.   Pulmonary:      Effort: Pulmonary effort is normal.      Breath sounds: Normal breath sounds.   Musculoskeletal:         General: Normal range of motion.      Cervical back: Normal range of motion.      Lumbar back: Spasms and tenderness present. Positive left straight leg raise test.   Skin:     General: Skin is warm and dry.      Capillary Refill: Capillary refill takes less than 2 seconds.   Neurological:      General: No focal deficit present.      Mental Status: He is alert and oriented to person, place, and time.      Gait: Gait abnormal (slowed).   Psychiatric:         Mood and Affect: Mood normal.        "  Behavior: Behavior normal.         Thought Content: Thought content normal.         Cognition and Memory: Cognition normal.       Assessment & Plan   Diagnoses and all orders for this visit:    1. Lumbar radiculopathy (Primary)    2. Spinal stenosis of lumbar region with neurogenic claudication    3. Left-sided low back pain with left-sided sciatica, unspecified chronicity    --- Patient continues to note ongoing relief from most recent procedure. Will anticipate the need to repeat this procedure every 3 months. Plan will be to order a Left L5 & S1 TF LESI at this time to be scheduled on/after 1/3/24.  ---  Indications for epidural injection:  Plan is to proceed with epidural at the appropriate level.  If the patient receives significant pain reduction and improvement in function and the plan will be to repeat the epidural when the pain worsens.  If a second epidural provides at least 6 weeks of sustained improvement that includes both pain reduction and improvement in function then an epidural injection could be repeated once again at the same level.  This is a mutual decision between the clinician and the patient that includes discussions including risks and benefits in detail as well as alternative therapies.  Patient's questions were answered to their satisfaction and to their understanding.  ---  Discussed with the patient that sedation is optional for this procedure.  The sedation offered is called conscious sedation which is different from general anesthesia that is utilized in surgical procedures. The dosing of the sedation is determined by the physician and they will be monitored throughout the procedure. With conscious sedation it is possible to remember parts or all of the procedure, this is normal. They will need to have a  with them as driving is prohibited following conscious sedation.      NPO instructions for conscious sedation:  --- Do not eat 6 hours prior to the procedure.   --- Do not  drink any dairy or citrus 4 hours prior to the procedure.   --- Do not drink anything, including clear liquids, 2 hours prior to procedure.      If the NPO instructions are not followed then the procedure may be performed without sedation or the procedure will need to be rescheduled.   --- Encouraged patient to continue to work on decreasing alcohol and tobacco use.     --- Follow-up for procedure    Jose Villafuerte reports a pain score of 8.  Given his pain assessment as noted, treatment options were discussed and the following options were decided upon as a follow-up plan to address the patient's pain: patient scheduled for repeat TF LESI on/after 1/3/24.       YUNIEL REPORT  As the clinician, I personally reviewed the YUNIEL from 11/20/23 while the patient was in the office today.    Dictated utilizing Dragon dictation.

## 2023-12-06 ENCOUNTER — TRANSCRIBE ORDERS (OUTPATIENT)
Dept: SURGERY | Facility: SURGERY CENTER | Age: 73
End: 2023-12-06
Payer: MEDICARE

## 2023-12-06 DIAGNOSIS — Z41.9 SURGERY, ELECTIVE: Primary | ICD-10-CM

## 2023-12-07 ENCOUNTER — TELEPHONE (OUTPATIENT)
Dept: INTERNAL MEDICINE | Facility: CLINIC | Age: 73
End: 2023-12-07

## 2023-12-07 NOTE — TELEPHONE ENCOUNTER
Caller: Jose Villafuerte    Relationship: Self    Best call back number: [phone]    What was the call regarding: PATIENT WOULD LIKE TO KNOW IF DR ROBLERO WOULD LIKE FOR HIM TO CONTINUE TAKING THE AMLODIPINE.    HUB ADVISED THIS HAD BEEN SENT AND CONFIRMED.     HE STATED THIS WAS NEVER RECEIVED AT HIS PHARMACY AFTER HIS LAST VISIT.    PLEASE CALL TO ADVISE.

## 2023-12-11 NOTE — TELEPHONE ENCOUNTER
Yes.  It was sent along with the lisinopril to the following pharmacy in November.    [facility] [id number] - [place], [place] - [address] [facility] - [phone]  - [phone]   [address], [place] [facility] [address]  Phone: [phone]  Fax: [phone]

## 2024-01-04 ENCOUNTER — APPOINTMENT (OUTPATIENT)
Dept: GENERAL RADIOLOGY | Facility: SURGERY CENTER | Age: 74
End: 2024-01-04
Payer: MEDICARE

## 2024-01-26 ENCOUNTER — TELEPHONE (OUTPATIENT)
Dept: PAIN MEDICINE | Facility: CLINIC | Age: 74
End: 2024-01-26

## 2024-01-26 NOTE — TELEPHONE ENCOUNTER
I have not heard anything about an appeal either. I only see a denial letter which does not indicate why it was denied. I am going to say it was denied because he only reported 40% relief. If that is the case, there is not much I can do to appeal it. I have added Av to this message for further clarification.

## 2024-01-26 NOTE — TELEPHONE ENCOUNTER
Provider: MARY    Caller: PATIENT    Phone Number: [phone]    Reason for Call: PATIENT IS REQUESTING AN UPDATE ON WHETHER OR NOT THE APPEAL TO HIS INSURANCE WAS DONE/APPROVED FOR HIS EPIDURAL.

## 2024-01-29 NOTE — TELEPHONE ENCOUNTER
Per Av: Adam [phone] spoke with Quita ÁLVAREZ advised that denial letter was never received, for CPT: 75059, 17294. Faxing over denial letter with the next 24-48 hours. [id number].Once I received the denial letter I will scan into media.

## 2024-02-27 ENCOUNTER — OFFICE VISIT (OUTPATIENT)
Dept: INTERNAL MEDICINE | Facility: CLINIC | Age: 74
End: 2024-02-27
Payer: MEDICARE

## 2024-02-27 VITALS
DIASTOLIC BLOOD PRESSURE: 72 MMHG | WEIGHT: 134 LBS | BODY MASS INDEX: 20.31 KG/M2 | SYSTOLIC BLOOD PRESSURE: 132 MMHG | HEIGHT: 68 IN

## 2024-02-27 DIAGNOSIS — Z85.828 HISTORY OF SKIN CANCER: ICD-10-CM

## 2024-02-27 DIAGNOSIS — L81.9 PIGMENTED SKIN LESION SUSPICIOUS FOR MALIGNANT NEOPLASM: ICD-10-CM

## 2024-02-27 NOTE — PROGRESS NOTES
"Chief Complaint  Follow-up (Referral for derm)    Subjective        Jose Villafuerte presents to [facility]  History of Present Illness    He is wishing to be seen today for a spot on his head.  The spot has been on the right side of his head for a few weeks.  No pain but it is itching.  No bleeding. He does have a history of skin cancer with excision in the remote past.        Objective   Vital Signs:  /72 (BP Location: Left arm, Patient Position: Sitting, Cuff Size: Small Adult)   Ht 172.7 cm (68\")   Wt 60.8 kg (134 lb)   BMI 20.37 kg/m²   Estimated body mass index is 20.37 kg/m² as calculated from the following:    Height as of this encounter: 172.7 cm (68\").    Weight as of this encounter: 60.8 kg (134 lb).       BMI is within normal parameters. No other follow-up for BMI required.      Physical Exam  Vitals and nursing note reviewed.   Constitutional:       Appearance: Normal appearance.   Skin:     Comments: Approximately 2cm x 3 cm hyperpigmented lesion with no surrounding erythema.  Oval in shape        Result Review :                     Assessment and Plan     Diagnoses and all orders for this visit:    1. Pigmented skin lesion suspicious for malignant neoplasm  -     Ambulatory Referral to Dermatology    2. History of skin cancer  -     Ambulatory Referral to Dermatology    Suspicious pigemented lesion on the right of his head.  With his history of skin cancer, I think it is reasonable to pursue dermatology for biopsy of the lesion.  See back in May as planned.         Follow Up     Return if symptoms worsen or fail to improve.  Patient was given instructions and counseling regarding his condition or for health maintenance advice. Please see specific information pulled into the AVS if appropriate.         "

## 2024-03-06 ENCOUNTER — TELEPHONE (OUTPATIENT)
Dept: INTERNAL MEDICINE | Facility: CLINIC | Age: 74
End: 2024-03-06
Payer: MEDICARE

## 2024-03-06 DIAGNOSIS — H57.9 ABNORMAL SENSATION OF EYE: ICD-10-CM

## 2024-03-06 DIAGNOSIS — H02.9 EYELID ABNORMALITY: Primary | ICD-10-CM

## 2024-03-06 NOTE — TELEPHONE ENCOUNTER
Caller: Jose Villafuerte    Relationship: Self    Best call back number: [phone]    What is the medical concern/diagnosis: SAGGING IN RIGHT EYE LID, FEELS LIKE SOMETHING IS STUCK IN HIS EYE    What specialty or service is being requested: EYE DOCTOR     Any additional details: PATIENT IS REQUESTING A REFERRAL TO AN EYE DOCTOR TO DISCUSS THE SAGGING IN HIS EYELID, ALONG WITH THE FEELING THAT SOMETHING MAY BE STUCK IN HIS EYE.

## 2024-03-15 ENCOUNTER — TELEPHONE (OUTPATIENT)
Dept: INTERNAL MEDICINE | Facility: CLINIC | Age: 74
End: 2024-03-15
Payer: MEDICARE

## 2024-03-15 DIAGNOSIS — F17.210 CIGARETTE NICOTINE DEPENDENCE WITHOUT COMPLICATION: Primary | ICD-10-CM

## 2024-03-15 DIAGNOSIS — R91.8 PULMONARY NODULES: ICD-10-CM

## 2024-03-15 NOTE — TELEPHONE ENCOUNTER
Lung cancer screening - ordered    2. Colon cancer screening - he just needs to call Dr. Alexander's office.  It was less than 3 years ago.  They can get him scheduled.    3. Prostate cancer screening - prostate cancer screening stops after 69 years of age unless there is a history of prostate cancer.

## 2024-03-15 NOTE — TELEPHONE ENCOUNTER
Caller: Jose Villafuerte    Relationship: Self    Best call back number:     What orders are you requesting (i.e. lab or imaging): COLONOSCOPY AND LUNG SCAN, PROSTATE TESTING    In what timeframe would the patient need to come in:     Where will you receive your lab/imaging services: [facility]    Additional notes: PATIENT IS CALLING IN TO REQUEST ORDERS TO HAVE A COLONOSCOPY, PROSTATE TESTING, AND A LUNG SCAN.  HE WANTS TO BE CALLED WITH DIRECTION AS HE DOES NOT KNOW IF HE NEEDS TO HAVE LABS OR NOT.  HE WANTS TO BE CALLED ONCE COMPLETED.

## 2024-04-13 ENCOUNTER — HOSPITAL ENCOUNTER (OUTPATIENT)
Facility: HOSPITAL | Age: 74
Discharge: HOME | End: 2024-04-13
Payer: MEDICARE

## 2024-04-13 DIAGNOSIS — R91.8 PULMONARY NODULES: ICD-10-CM

## 2024-04-13 DIAGNOSIS — F17.210 CIGARETTE NICOTINE DEPENDENCE WITHOUT COMPLICATION: ICD-10-CM

## 2024-04-13 PROCEDURE — 71271 CT THORAX LUNG CANCER SCR C-: CPT

## 2024-04-29 ENCOUNTER — PREP FOR SURGERY (OUTPATIENT)
Dept: SURGERY | Facility: SURGERY CENTER | Age: 74
End: 2024-04-29
Payer: MEDICARE

## 2024-04-29 ENCOUNTER — OFFICE VISIT (OUTPATIENT)
Dept: PAIN MEDICINE | Facility: CLINIC | Age: 74
End: 2024-04-29
Payer: MEDICARE

## 2024-04-29 VITALS
DIASTOLIC BLOOD PRESSURE: 77 MMHG | SYSTOLIC BLOOD PRESSURE: 132 MMHG | HEART RATE: 79 BPM | RESPIRATION RATE: 18 BRPM | HEIGHT: 68 IN | WEIGHT: 137 LBS | TEMPERATURE: 97.7 F | BODY MASS INDEX: 20.76 KG/M2 | OXYGEN SATURATION: 100 %

## 2024-04-29 DIAGNOSIS — M54.16 LUMBAR RADICULOPATHY: ICD-10-CM

## 2024-04-29 DIAGNOSIS — M48.062 SPINAL STENOSIS OF LUMBAR REGION WITH NEUROGENIC CLAUDICATION: Primary | Chronic | ICD-10-CM

## 2024-04-29 PROCEDURE — 99214 OFFICE O/P EST MOD 30 MIN: CPT | Performed by: ANESTHESIOLOGY

## 2024-04-29 PROCEDURE — 3078F DIAST BP <80 MM HG: CPT | Performed by: ANESTHESIOLOGY

## 2024-04-29 PROCEDURE — 1125F AMNT PAIN NOTED PAIN PRSNT: CPT | Performed by: ANESTHESIOLOGY

## 2024-04-29 PROCEDURE — 1159F MED LIST DOCD IN RCRD: CPT | Performed by: ANESTHESIOLOGY

## 2024-04-29 PROCEDURE — 1160F RVW MEDS BY RX/DR IN RCRD: CPT | Performed by: ANESTHESIOLOGY

## 2024-04-29 PROCEDURE — 3075F SYST BP GE 130 - 139MM HG: CPT | Performed by: ANESTHESIOLOGY
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
## 2024-04-30 ENCOUNTER — PREP FOR SURGERY (OUTPATIENT)
Dept: OTHER | Facility: HOSPITAL | Age: 74
End: 2024-04-30
Payer: MEDICARE

## 2024-04-30 DIAGNOSIS — M48.062 SPINAL STENOSIS OF LUMBAR REGION WITH NEUROGENIC CLAUDICATION: ICD-10-CM

## 2024-04-30 DIAGNOSIS — M54.16 LUMBAR RADICULOPATHY: Primary | ICD-10-CM

## 2024-04-30 RX ORDER — SODIUM CHLORIDE 0.9 % (FLUSH) 0.9 %
1-10 SYRINGE (ML) INJECTION AS NEEDED
OUTPATIENT
Start: 2024-04-30

## 2024-04-30 RX ORDER — IOPAMIDOL 408 MG/ML
12 INJECTION, SOLUTION INTRATHECAL
OUTPATIENT
Start: 2024-05-01 | End: 2024-05-01

## 2024-04-30 RX ORDER — METHYLPREDNISOLONE ACETATE 80 MG/ML
80 INJECTION, SUSPENSION INTRA-ARTICULAR; INTRALESIONAL; INTRAMUSCULAR; SOFT TISSUE ONCE
OUTPATIENT
Start: 2024-05-01 | End: 2024-05-01

## 2024-04-30 RX ORDER — LIDOCAINE HYDROCHLORIDE 10 MG/ML
10 INJECTION, SOLUTION INFILTRATION; PERINEURAL ONCE
OUTPATIENT
Start: 2024-05-01 | End: 2024-05-01

## 2024-04-30 RX ORDER — BUPIVACAINE HYDROCHLORIDE 5 MG/ML
10 INJECTION, SOLUTION EPIDURAL; INTRACAUDAL ONCE
OUTPATIENT
Start: 2024-05-01 | End: 2024-05-01

## 2024-05-01 ENCOUNTER — TRANSCRIBE ORDERS (OUTPATIENT)
Dept: SURGERY | Facility: SURGERY CENTER | Age: 74
End: 2024-05-01
Payer: MEDICARE

## 2024-05-01 DIAGNOSIS — Z41.9 SURGERY, ELECTIVE: Primary | ICD-10-CM

## 2024-05-02 ENCOUNTER — TELEPHONE (OUTPATIENT)
Dept: PAIN MEDICINE | Facility: CLINIC | Age: 74
End: 2024-05-02

## 2024-05-02 NOTE — TELEPHONE ENCOUNTER
Caller: Jose Villafuerte    Relationship to patient: Self    Best call back number: [phone]    Chief complaint: LUMBAR PAIN    Type of visit: EPIDURAL INJECTION    Requested date: AROUND 5/20TH     If rescheduling, when is the original appointment: 5/07/24     Additional notes:PT IS CALLING TO RESCHEDULE HIS EPIDURAL APPOINTMENT.. PLEASE ADVISE..

## 2024-05-07 ENCOUNTER — HOSPITAL ENCOUNTER (OUTPATIENT)
Facility: SURGERY CENTER | Age: 74
Setting detail: HOSPITAL OUTPATIENT SURGERY
Discharge: HOME | End: 2024-05-07
Attending: ANESTHESIOLOGY | Admitting: ANESTHESIOLOGY
Payer: MEDICARE

## 2024-05-07 ENCOUNTER — HOSPITAL ENCOUNTER (OUTPATIENT)
Dept: GENERAL RADIOLOGY | Facility: SURGERY CENTER | Age: 74
End: 2024-05-07
Payer: MEDICARE

## 2024-05-07 VITALS
SYSTOLIC BLOOD PRESSURE: 147 MMHG | HEIGHT: 68 IN | HEART RATE: 70 BPM | OXYGEN SATURATION: 96 % | TEMPERATURE: 97.8 F | WEIGHT: 137 LBS | BODY MASS INDEX: 20.76 KG/M2 | RESPIRATION RATE: 16 BRPM | DIASTOLIC BLOOD PRESSURE: 73 MMHG

## 2024-05-07 DIAGNOSIS — Z41.9 SURGERY, ELECTIVE: ICD-10-CM

## 2024-05-07 DIAGNOSIS — M54.16 LUMBAR RADICULOPATHY: ICD-10-CM

## 2024-05-07 DIAGNOSIS — M48.062 SPINAL STENOSIS OF LUMBAR REGION WITH NEUROGENIC CLAUDICATION: ICD-10-CM

## 2024-05-07 PROCEDURE — 64484 NJX AA&/STRD TFRM EPI L/S EA: CPT | Performed by: ANESTHESIOLOGY

## 2024-05-07 PROCEDURE — 76000 FLUOROSCOPY <1 HR PHYS/QHP: CPT

## 2024-05-07 PROCEDURE — 64483 NJX AA&/STRD TFRM EPI L/S 1: CPT | Performed by: ANESTHESIOLOGY

## 2024-05-07 PROCEDURE — 25010000002 BUPIVACAINE (PF) 0.5 % SOLUTION 10 ML VIAL: Performed by: ANESTHESIOLOGY

## 2024-05-07 PROCEDURE — 25010000002 METHYLPREDNISOLONE PER 80 MG: Performed by: ANESTHESIOLOGY

## 2024-05-07 PROCEDURE — 77002 NEEDLE LOCALIZATION BY XRAY: CPT

## 2024-05-07 PROCEDURE — 25510000001 IOPAMIDOL 61 % SOLUTION 30 ML VIAL: Performed by: ANESTHESIOLOGY

## 2024-05-07 RX ORDER — DIAZEPAM 5 MG/1
10 TABLET ORAL ONCE
Status: COMPLETED | OUTPATIENT
Start: 2024-05-07 | End: 2024-05-07

## 2024-05-07 RX ADMIN — DIAZEPAM 10 MG: 5 TABLET ORAL at 13:59

## 2024-05-13 ENCOUNTER — OFFICE VISIT (OUTPATIENT)
Dept: INTERNAL MEDICINE | Facility: CLINIC | Age: 74
End: 2024-05-13
Payer: MEDICARE

## 2024-05-13 VITALS
RESPIRATION RATE: 18 BRPM | HEART RATE: 87 BPM | BODY MASS INDEX: 20.16 KG/M2 | DIASTOLIC BLOOD PRESSURE: 70 MMHG | SYSTOLIC BLOOD PRESSURE: 142 MMHG | OXYGEN SATURATION: 95 % | HEIGHT: 68 IN | WEIGHT: 133 LBS

## 2024-05-13 DIAGNOSIS — I10 ESSENTIAL HYPERTENSION: Primary | Chronic | ICD-10-CM

## 2024-05-13 PROCEDURE — G2211 COMPLEX E/M VISIT ADD ON: HCPCS | Performed by: FAMILY MEDICINE

## 2024-05-13 PROCEDURE — 1125F AMNT PAIN NOTED PAIN PRSNT: CPT | Performed by: FAMILY MEDICINE

## 2024-05-13 PROCEDURE — 99213 OFFICE O/P EST LOW 20 MIN: CPT | Performed by: FAMILY MEDICINE

## 2024-05-13 PROCEDURE — 1159F MED LIST DOCD IN RCRD: CPT | Performed by: FAMILY MEDICINE

## 2024-05-13 PROCEDURE — 3077F SYST BP >= 140 MM HG: CPT | Performed by: FAMILY MEDICINE

## 2024-05-13 PROCEDURE — 3078F DIAST BP <80 MM HG: CPT | Performed by: FAMILY MEDICINE

## 2024-05-13 PROCEDURE — 1160F RVW MEDS BY RX/DR IN RCRD: CPT | Performed by: FAMILY MEDICINE

## 2024-05-13 NOTE — PATIENT INSTRUCTIONS
"MyChart Tips:    [employer]Got[employer] can be a useful part of our patient care program and is a way for us to communicate lab results and for you to request refills.  Here is some information regarding the best way to use [employer] for communication.       Examples of medical issues that are APPROPRIATE for [employer]Got[employer]:  -Follow-up on problems we have already addressed in a visit such as home testing results, blood pressure readings, glucose readings  -Questions that can be answered with a simple \"yes\" or \"no\"  -Please keep communication concise and to the point.  All other types of communication should be held for an office visit.    Communication that is NOT APPROPRIATE for WhoGot[employer]uff[facility]:  -New problems, serious problems or urgent problems.  Urgent matters should be addressed by phone for advice, in the office, urgent care or the ER.  -Requesting Paxlovid or Antibiotics: These types of problems require an evaluation unless your provider approved this previously.    -Glints messages are not email.  Staff will check messages each weekday.  We strive for a 48-hour turnaround on messaging.    -Gl[employer] is not for private issues.  Messages are received first by our office staff.    Please be mindful that sometimes it may take longer to receive a response on Gl[employer].  Many times of the year we have high volumes of messages coming into physician inboxes.      Please also be mindful that [employer] messages become part of your permanent medical record.    We appreciate your respect for the limitations and boundaries of [employer].      Lab Results:  Please allow up to 7 business days for lab results to be sent through [employer] to you before contacting your provider.  Sometimes we are waiting for results to get back from the lab and also your provider needs time to analyze them thoroughly before making recommendations.  Also, providers may be out of the office on vacation.      While the internet has great resources, it is not a substitute for " interpreting lab results.

## 2024-05-13 NOTE — PROGRESS NOTES
"Chief Complaint  Follow-up and Hypertension    Subjective        Jose Villafuerte presents to [facility]  History of Present Illness    Hypertension - stable today in the office.  Patient taking medication as prescribed.    Patient is taking amlodipine and lisinopril.  Denies side effects of the medication.      Current Outpatient Medications:     amLODIPine (NORVASC) 10 MG tablet, Take 1 tablet by mouth Daily., Disp: 90 tablet, Rfl: 4    aspirin 81 MG EC tablet, Take 1 tablet by mouth Daily., Disp: , Rfl:     B Complex Vitamins (B COMPLEX 1 PO), Take 1 tablet by mouth. TAKES EVERY OTHER DAY, Disp: , Rfl:     Fexofenadine HCl (ALLERGY 24-HR PO), Take  by mouth., Disp: , Rfl:     lisinopril (PRINIVIL,ZESTRIL) 40 MG tablet, Take 1 tablet by mouth Daily., Disp: 90 tablet, Rfl: 4        Objective   Vital Signs:  /70 (BP Location: Left arm, Patient Position: Sitting, Cuff Size: Small Adult)   Pulse 87   Resp 18   Ht 172.7 cm (68\")   Wt 60.3 kg (133 lb)   SpO2 95%   BMI 20.22 kg/m²   Estimated body mass index is 20.22 kg/m² as calculated from the following:    Height as of this encounter: 172.7 cm (68\").    Weight as of this encounter: 60.3 kg (133 lb).       BMI is within normal parameters. No other follow-up for BMI required.      Physical Exam  Vitals and nursing note reviewed.   Constitutional:       General: He is not in acute distress.     Appearance: Normal appearance.   Cardiovascular:      Rate and Rhythm: Normal rate and regular rhythm.      Heart sounds: Normal heart sounds. No murmur heard.  Pulmonary:      Effort: Pulmonary effort is normal.      Breath sounds: Normal breath sounds.   Neurological:      Mental Status: He is alert.        Result Review :    The following data was reviewed by: John Etienne MD on 05/13/2024:  Common labs          11/10/2023    11:15   Common Labs   Glucose 100    BUN 4    Creatinine 0.53    Sodium 130    Potassium 4.8    Chloride " 93    Calcium 9.9    Total Cholesterol 173    Triglycerides 57    HDL Cholesterol 71    LDL Cholesterol  91    PSA 2.480                   Assessment and Plan     Diagnoses and all orders for this visit:    1. Essential hypertension (Primary)      HTN - stable in the office today.  Continue amlodipine and lisinopril as above.  Labs reviewed as above.         Follow Up     Return if symptoms worsen or fail to improve.  Patient was given instructions and counseling regarding his condition or for health maintenance advice. Please see specific information pulled into the AVS if appropriate.

## 2024-07-03 ENCOUNTER — TELEPHONE (OUTPATIENT)
Dept: GASTROENTEROLOGY | Facility: CLINIC | Age: 74
End: 2024-07-03
Payer: MEDICARE

## 2024-07-08 ENCOUNTER — PREP FOR SURGERY (OUTPATIENT)
Dept: SURGERY | Facility: SURGERY CENTER | Age: 74
End: 2024-07-08
Payer: MEDICARE

## 2024-07-08 DIAGNOSIS — Z12.11 ENCOUNTER FOR SCREENING FOR MALIGNANT NEOPLASM OF COLON: Primary | ICD-10-CM

## 2024-07-08 RX ORDER — SODIUM CHLORIDE 0.9 % (FLUSH) 0.9 %
3 SYRINGE (ML) INJECTION EVERY 12 HOURS SCHEDULED
OUTPATIENT
Start: 2024-07-08

## 2024-07-08 RX ORDER — SODIUM CHLORIDE 0.9 % (FLUSH) 0.9 %
10 SYRINGE (ML) INJECTION AS NEEDED
OUTPATIENT
Start: 2024-07-08

## 2024-07-08 RX ORDER — SODIUM CHLORIDE, SODIUM LACTATE, POTASSIUM CHLORIDE, CALCIUM CHLORIDE 600; 310; 30; 20 MG/100ML; MG/100ML; MG/100ML; MG/100ML
30 INJECTION, SOLUTION INTRAVENOUS CONTINUOUS PRN
OUTPATIENT
Start: 2024-07-08

## 2024-07-29 ENCOUNTER — OFFICE VISIT (OUTPATIENT)
Dept: PAIN MEDICINE | Facility: CLINIC | Age: 74
End: 2024-07-29
Payer: MEDICARE

## 2024-07-29 ENCOUNTER — PREP FOR SURGERY (OUTPATIENT)
Dept: SURGERY | Facility: SURGERY CENTER | Age: 74
End: 2024-07-29
Payer: MEDICARE

## 2024-07-29 VITALS
DIASTOLIC BLOOD PRESSURE: 70 MMHG | TEMPERATURE: 96.8 F | OXYGEN SATURATION: 98 % | BODY MASS INDEX: 19.61 KG/M2 | HEART RATE: 73 BPM | WEIGHT: 129.4 LBS | SYSTOLIC BLOOD PRESSURE: 140 MMHG | HEIGHT: 68 IN

## 2024-07-29 DIAGNOSIS — M54.16 LUMBAR RADICULOPATHY: ICD-10-CM

## 2024-07-29 DIAGNOSIS — M48.062 SPINAL STENOSIS OF LUMBAR REGION WITH NEUROGENIC CLAUDICATION: Primary | ICD-10-CM

## 2024-07-29 DIAGNOSIS — M54.42 LEFT-SIDED LOW BACK PAIN WITH LEFT-SIDED SCIATICA, UNSPECIFIED CHRONICITY: ICD-10-CM

## 2024-07-29 PROCEDURE — 1125F AMNT PAIN NOTED PAIN PRSNT: CPT

## 2024-07-29 PROCEDURE — 99213 OFFICE O/P EST LOW 20 MIN: CPT

## 2024-07-29 PROCEDURE — 1159F MED LIST DOCD IN RCRD: CPT

## 2024-07-29 PROCEDURE — 3077F SYST BP >= 140 MM HG: CPT

## 2024-07-29 PROCEDURE — 3078F DIAST BP <80 MM HG: CPT

## 2024-07-29 PROCEDURE — 1160F RVW MEDS BY RX/DR IN RCRD: CPT

## 2024-07-29 RX ORDER — AMOXICILLIN 500 MG/1
500 CAPSULE ORAL 3 TIMES DAILY
COMMUNITY
Start: 2024-07-22

## 2024-07-29 NOTE — PROGRESS NOTES
CHIEF COMPLAINT  Back pain    Subjective   Jose Villafuerte is a 74 y.o. male  who presents to the office for follow-up of procedure.  He completed a Left L5 & S1 TF LESI on 5/7/24 performed by Dr. Corcoran for management of back pain. Patient reports 70% relief from the procedure for the first month then 50% for almost 2 months. He reports that following the procedure he was able to be more active. He is able to mow his lawn and do things around his house with increased pain.     Today pain is 8/10VAS in severity. Pain is located in his left buttock and radiates down left posterior leg. Denies radicular pain to right leg. Describes this pain as a nearly continuous aching and burning. Pain is worsened by prolonged standing or sitting. Pain improves with rest/reposition. Patient reports that he uses the treadmill and an inversion table when able.     Patient has been evaluated by Dr. Mackey in the past who recommended a left L5-S1 laminectomy, foraminotomies, and medial facetectomy with Metrix. Dr. Mackey declined moving forward with operative intervention secondary to patient's history of alcohol abuse.  Dr. Corcoran notes patient is not a good candidate for dorsal column stimulation. Patient wishes to avoid opioid medication and notes that epidurals manage his pain well. Epidural injections every 3-4 months offer significant pain relief and the patient to be more active.     Continues to take Aspirin 81mg for aortic atherosclerosis.      Procedures:  5/7/24 - Left L5 & S1 TF LESI - 70% relief x 1 month, 50% for almost 2 months  10/3/23 - Left L5 & S1 TF LESI - 40% relief x 3 months  7/6/23 - Left L5 & S1 TF TOM - 75% relief x 3 months  12/13/23 - Left SI joint and left piriformis injection - 50% relief x 6 weeks   9/7/22 - Left Piriformis injection - 50% relief x 4 weeks  5/25/22 - Left L2 & L5 TF TOM     Back Pain  This is a chronic problem. The current episode started more than 1 year ago. The problem occurs  constantly. The problem has been waxing and waning since onset. The pain is present in the lumbar spine, sacro-iliac and gluteal. The quality of the pain is described as aching and burning. The pain radiates to the left thigh (Radiates into left buttock and down left posterior leg stopping at the knee). The pain is at a severity of 8/10. The pain is moderate. The symptoms are aggravated by standing, sitting and position (prolonged position). Associated symptoms include weakness (left leg). Pertinent negatives include no abdominal pain, chest pain, dysuria, fever, headaches or numbness. He has tried bed rest, heat, ice, walking, NSAIDs and muscle relaxant for the symptoms.      PEG Assessment   What number best describes your pain on average in the past week?8  What number best describes how, during the past week, pain has interfered with your enjoyment of life?4  What number best describes how, during the past week, pain has interfered with your general activity?  8    Review of Pertinent Medical Data ---  Reviewed office note from Dr. Corcoran from 4/29/24.  Patient notes significant relief in multiple locations with epidural injections.  He noted significant relief from epidural injection performed on 10/3/2023 for more than 4 months.  Patient is nonsurgical candidate and is not an optimal candidate for dorsal column stimulation.  He is not interested in opioid medication.  Plan at that time was to repeat a left L5 and S1 transforaminal epidural.      The following portions of the patient's history were reviewed and updated as appropriate: allergies, current medications, past family history, past medical history, past social history, past surgical history, and problem list.    Review of Systems   Constitutional:  Negative for chills and fever.   Respiratory:  Negative for cough and shortness of breath.    Cardiovascular:  Negative for chest pain.   Gastrointestinal:  Negative for abdominal pain, constipation and  "diarrhea.   Genitourinary:  Negative for difficulty urinating and dysuria.   Musculoskeletal:  Positive for back pain.   Neurological:  Positive for dizziness, weakness (left leg) and light-headedness. Negative for numbness and headaches.   Psychiatric/Behavioral:  Negative for agitation.      I have reviewed and confirmed the accuracy of the ROS as documented by the MA/LPN/RN JENNIFER Wade     Vitals:    07/29/24 0916   BP: 140/70   BP Location: Left arm   Patient Position: Sitting   Pulse: 73   Temp: 96.8 °F (36 °C)   TempSrc: Temporal   SpO2: 98%   Weight: 58.7 kg (129 lb 6.4 oz)   Height: 172.7 cm (68\")   PainSc:   8   PainLoc: Back     Objective   Physical Exam  Constitutional:       Appearance: Normal appearance.   HENT:      Head: Normocephalic.   Cardiovascular:      Rate and Rhythm: Normal rate and regular rhythm.   Pulmonary:      Effort: Pulmonary effort is normal.      Breath sounds: Normal breath sounds.   Musculoskeletal:         General: Normal range of motion.      Cervical back: Normal range of motion.      Lumbar back: Spasms and tenderness present. Positive left straight leg raise test.   Skin:     General: Skin is warm and dry.      Capillary Refill: Capillary refill takes less than 2 seconds.   Neurological:      General: No focal deficit present.      Mental Status: He is alert and oriented to person, place, and time.      Gait: Gait abnormal (slowed).   Psychiatric:         Mood and Affect: Mood normal.         Behavior: Behavior normal.         Thought Content: Thought content normal.         Cognition and Memory: Cognition normal.       Assessment & Plan   Diagnoses and all orders for this visit:    1. Spinal stenosis of lumbar region with neurogenic claudication (Primary)    2. Lumbar radiculopathy    3. Left-sided low back pain with left-sided sciatica, unspecified chronicity      Jose Villafuerte reports a pain score of 8.  Given his pain assessment as noted, treatment options " were discussed and the following options were decided upon as a follow-up plan to address the patient's pain: continuation of current treatment plan for pain and steroid injections.    --- Left L5 & S1 TF LESI - on/after 8/7/24  ---  Indications for epidural injection:  Plan is to proceed with epidural at the appropriate level.  If the patient receives significant pain reduction and improvement in function and the plan will be to repeat the epidural when the pain worsens.  If a second epidural provides at least 6 weeks of sustained improvement that includes both pain reduction and improvement in function then an epidural injection could be repeated once again at the same level.  This is a mutual decision between the clinician and the patient that includes discussions including risks and benefits in detail as well as alternative therapies.  Patient's questions were answered to their satisfaction and to their understanding.  ---   --- Follow-up for procedure     Pain / Disability Scale  The scale used for measurement of pain and/or disability for this patient was the [place] back pain disability scale.  The score was 67 on 07/29/2024       Jose Villafuerte  reports that he has been smoking cigarettes. He started smoking about 44 years ago. He has a 89.1 pack-year smoking history. He has never used smokeless tobacco. I have educated him on the risk of diseases from using tobacco products such as cancer, COPD, and heart disease.     I advised him to quit and he is not willing to quit.    I spent 3  minutes counseling the patient.    YUNIEL REPORT  As the clinician, I personally reviewed the YUNIEL from 7/29/24 while the patient was in the office today.    Dictated utilizing Dragon dictation.

## 2024-08-01 ENCOUNTER — ANESTHESIA EVENT (OUTPATIENT)
Age: 74
End: 2024-08-01
Payer: MEDICARE

## 2024-08-01 RX ORDER — FOLIC ACID 1 MG/1
1 TABLET ORAL DAILY
COMMUNITY
End: 2024-08-08 | Stop reason: HOSPADM

## 2024-08-01 NOTE — DISCHARGE INSTRUCTIONS
POST OPERATIVE INSTRUCTIONS  EYELID SURGERY      Patient Name:   Date of Birth:    Most procedures are performed with local anesthesia with intravenous sedation. While post-operative nausea is rare with this type of anesthesia, it is wise to start your diet by drinking clear liquids after surgery (e.g., 7-Up, Gatorade, ginger ale, etc.).  If clear liquids are tolerated well without nausea or vomiting, you may advance towards a regular diet.  Avoid “fatty foods” (eg, milk, pizza, hamburgers, etc.) on the day of surgery or as long as nausea persists.    Many eyelid procedures only require Extra Strength Tylenol for postoperative pain control.  For those patients with more extensive eyelid reconstruction, a prescription for a pain reliever is often given.  Patients may choose to take the prescribed pain reliever OR Extra Strength Tylenol, BUT NOT both together.  Unless specifically instructed, aspirin products such as Get, Bufferin, Anacin, Excedrin, etc., should be avoided for at least one week after surgery.  This also includes Advil, Nuprin, Motrin and Ibuprofen.  Any other medications (except blood thinners), which you were taking prior to surgery, should be continued on their regular schedule.  Whenever lying down or sleeping, keep your head elevated on 2 or 3 pillows such that your head is always elevated above the level of your chest.    Apply cold compress to surgical site as much as possible for 2 to3 days following surgery.  A folded washcloth soaked in ice-cold water and wrung out works well for this.  A bag of frozen peas also works well as it is lightweight but molds to the eye.  Do not apply ice directly to the skin.  A small tube of eye ointment should be provided after surgery.  This is to be gently applied to the stitches twice daily.  If the eyes feel irritated, the ointment is safe to use in the eye for lubrication.  This will likely result in blurred vision but will go away once the ointment is  stopped.  EXCEPTION:  If a patch is taped over the eye, do NOT apply cold compresses or ointment.  Leave the patch undisturbed.  A physician will remove the patch at your first post-operative visit.  If your surgery was done on an outpatient basis, you should receive a phone call the day after surgery to check on your progress and to arrange for a post-operative clinic appointment.  The first post-operative visit will be one to two weeks after surgery to check the incisions and remove sutures if necessary.  A second post-operative visit six to eight weeks after surgery will assess the final surgical result.  The following problems should be reported to the office as soon as possible:  Continuous, brisk bleeding.  Please note that some oozing or drainage is common following a surgical procedure.  Do not try to clean dried blood from the surgical site.   This will likely only create more bleeding.  Temperature (fever) over 101?F.  Excessive pain at surgical site not relieved by the pain medication, especially if associated with protrusion of the eyeball.  Sudden loss of vision.  Please note that some blurriness is expected after surgery due to the ointment used around the eyes.  All patients should be able to check their vision even with eyelid swelling.  Double vision      If a problem should arise or you have a question, I can be reached at any time of the day or week by calling [phone].  I hope that your surgery experience with us is a positive one.  Please contact my office with any questions.  Sincerely,  Oscar Phiilp MD, MD Ever Negrete MD

## 2024-08-01 NOTE — PAT
PAT call complete. Education provided to the patient on the following:     - Nothing to eat after midnight the night before your procedure, water and black coffee okay up to 2 hours before arrival time.  - If diabetic and procedure is after noon: No food 8 hours prior to arrival time, and only then only clear liquids 2 hours before arrival time.   - You will need to have someone drive you home after your procedure and remain with you for 24 hours after. The  will need to remain on site during your visit.  - Please remove all jewelry, including body piercing's, and leave any valuables at home. Only bring your drivers license and insurance card on day of procedure.  - Do not use any tobacco products on morning of procedure.  - Wash with antibacterial soap (such as Dial) the night before and morning of procedure.  - Be prepared to provide your last dose of all home medications.  - Coffee and vending available on the 1st and 5th floors; no cafeteria on site.  - You will need to arrive at 0730 on 8/8/24 at [facility] located at [address]. We are located on the 5th floor.  -Please be aware that arrival times may be subject to change up until the day of surgery.   - Feel free to contact us at: [phone] with any additional questions/concerns.   -Patient instructed to take Amlodipine MOS, Hold Lisinopril MOS.  Patient is stopping aspirin and vitamins today.  Patient will go to [facility] to obtain an ECG reading prior to surgery 8/8/24.  Preston Akhtar

## 2024-08-01 NOTE — ANESTHESIA PREPROCEDURE EVALUATION
Anesthesia Evaluation     Patient summary reviewed and Nursing notes reviewed   NPO Solid Status: > 6 hours  NPO Liquid Status: > 2 hours           Airway   Mallampati: II  TM distance: >3 FB  Neck ROM: full  Dental    (+) poor dentition    Pulmonary    (+) a smoker (2+ PPD) Current, Smoked day of surgery, cigarettes, COPD,  (-) asthma  Cardiovascular     ECG reviewed    (+) hypertension  (-) past MI, dysrhythmias, angina      Neuro/Psych  (-) seizures, CVA  GI/Hepatic/Renal/Endo    (-) GERD, liver disease, no renal disease, diabetes, no thyroid disorder    Musculoskeletal     (+) back pain  Abdominal    Substance History   (+) alcohol use     OB/GYN          Other                      Anesthesia Plan    ASA 3           CODE STATUS:

## 2024-08-05 ENCOUNTER — HOSPITAL ENCOUNTER (OUTPATIENT)
Dept: CARDIOLOGY | Facility: HOSPITAL | Age: 74
Discharge: HOME | End: 2024-08-05
Admitting: OPHTHALMOLOGY
Payer: MEDICARE

## 2024-08-05 DIAGNOSIS — I10 ESSENTIAL HYPERTENSION: Chronic | ICD-10-CM

## 2024-08-05 LAB
QT INTERVAL: 391 MS
QTC INTERVAL: 434 MS

## 2024-08-05 PROCEDURE — 93005 ELECTROCARDIOGRAM TRACING: CPT | Performed by: OPHTHALMOLOGY

## 2024-08-08 ENCOUNTER — ANESTHESIA (OUTPATIENT)
Age: 74
End: 2024-08-08
Payer: MEDICARE

## 2024-08-08 ENCOUNTER — HOSPITAL ENCOUNTER (OUTPATIENT)
Age: 74
Setting detail: HOSPITAL OUTPATIENT SURGERY
Discharge: HOME | End: 2024-08-08
Attending: OPHTHALMOLOGY | Admitting: OPHTHALMOLOGY
Payer: MEDICARE

## 2024-08-08 VITALS
TEMPERATURE: 97.5 F | HEART RATE: 69 BPM | SYSTOLIC BLOOD PRESSURE: 134 MMHG | RESPIRATION RATE: 16 BRPM | WEIGHT: 126.8 LBS | DIASTOLIC BLOOD PRESSURE: 66 MMHG | HEIGHT: 67 IN | BODY MASS INDEX: 19.9 KG/M2 | OXYGEN SATURATION: 94 %

## 2024-08-08 DIAGNOSIS — I10 ESSENTIAL HYPERTENSION: Primary | Chronic | ICD-10-CM

## 2024-08-08 PROCEDURE — 25010000002 BUPIVACAINE (PF) 0.5 % SOLUTION 10 ML VIAL: Performed by: OPHTHALMOLOGY

## 2024-08-08 PROCEDURE — 15823 BLEPHARP UPR EYELID XCSV SKN: CPT | Performed by: OPHTHALMOLOGY

## 2024-08-08 PROCEDURE — 25010000002 DEXAMETHASONE SODIUM PHOSPHATE 20 MG/5ML SOLUTION: Performed by: NURSE ANESTHETIST, CERTIFIED REGISTERED

## 2024-08-08 PROCEDURE — 25010000002 ONDANSETRON PER 1 MG: Performed by: NURSE ANESTHETIST, CERTIFIED REGISTERED

## 2024-08-08 PROCEDURE — 25810000003 LACTATED RINGERS PER 1000 ML: Performed by: ANESTHESIOLOGY

## 2024-08-08 PROCEDURE — 67908 REPAIR EYELID DEFECT: CPT | Performed by: OPHTHALMOLOGY

## 2024-08-08 PROCEDURE — 25010000002 PROPOFOL 10 MG/ML EMULSION: Performed by: NURSE ANESTHETIST, CERTIFIED REGISTERED

## 2024-08-08 PROCEDURE — 25010000002 HYDROMORPHONE 1 MG/ML SOLUTION: Performed by: NURSE ANESTHETIST, CERTIFIED REGISTERED

## 2024-08-08 PROCEDURE — 67917 REPAIR EYELID DEFECT: CPT | Performed by: OPHTHALMOLOGY

## 2024-08-08 PROCEDURE — 25010000002 CEFAZOLIN PER 500 MG: Performed by: OPHTHALMOLOGY

## 2024-08-08 RX ORDER — DIPHENHYDRAMINE HYDROCHLORIDE 50 MG/ML
12.5 INJECTION INTRAMUSCULAR; INTRAVENOUS
Status: DISCONTINUED | OUTPATIENT
Start: 2024-08-08 | End: 2024-08-08 | Stop reason: HOSPADM

## 2024-08-08 RX ORDER — HYDROCODONE BITARTRATE AND ACETAMINOPHEN 7.5; 325 MG/1; MG/1
1 TABLET ORAL EVERY 4 HOURS PRN
Status: DISCONTINUED | OUTPATIENT
Start: 2024-08-08 | End: 2024-08-08 | Stop reason: HOSPADM

## 2024-08-08 RX ORDER — HYDROMORPHONE HYDROCHLORIDE 1 MG/ML
0.25 INJECTION, SOLUTION INTRAMUSCULAR; INTRAVENOUS; SUBCUTANEOUS
Status: DISCONTINUED | OUTPATIENT
Start: 2024-08-08 | End: 2024-08-08 | Stop reason: HOSPADM

## 2024-08-08 RX ORDER — FLUMAZENIL 0.1 MG/ML
0.2 INJECTION INTRAVENOUS AS NEEDED
Status: DISCONTINUED | OUTPATIENT
Start: 2024-08-08 | End: 2024-08-08 | Stop reason: HOSPADM

## 2024-08-08 RX ORDER — DROPERIDOL 2.5 MG/ML
0.62 INJECTION, SOLUTION INTRAMUSCULAR; INTRAVENOUS
Status: DISCONTINUED | OUTPATIENT
Start: 2024-08-08 | End: 2024-08-08 | Stop reason: HOSPADM

## 2024-08-08 RX ORDER — HYDROCODONE BITARTRATE AND ACETAMINOPHEN 5; 325 MG/1; MG/1
1 TABLET ORAL EVERY 6 HOURS PRN
Qty: 15 TABLET | Refills: 0 | Status: SHIPPED | OUTPATIENT
Start: 2024-08-08 | End: 2024-08-12

## 2024-08-08 RX ORDER — HYDROCODONE BITARTRATE AND ACETAMINOPHEN 5; 325 MG/1; MG/1
1 TABLET ORAL ONCE AS NEEDED
Status: DISCONTINUED | OUTPATIENT
Start: 2024-08-08 | End: 2024-08-08 | Stop reason: HOSPADM

## 2024-08-08 RX ORDER — EPHEDRINE SULFATE 50 MG/ML
INJECTION INTRAVENOUS AS NEEDED
Status: DISCONTINUED | OUTPATIENT
Start: 2024-08-08 | End: 2024-08-08 | Stop reason: SURG

## 2024-08-08 RX ORDER — ERYTHROMYCIN 5 MG/G
OINTMENT OPHTHALMIC AS NEEDED
Status: DISCONTINUED | OUTPATIENT
Start: 2024-08-08 | End: 2024-08-08 | Stop reason: HOSPADM

## 2024-08-08 RX ORDER — LIDOCAINE HYDROCHLORIDE 20 MG/ML
INJECTION, SOLUTION INFILTRATION; PERINEURAL AS NEEDED
Status: DISCONTINUED | OUTPATIENT
Start: 2024-08-08 | End: 2024-08-08 | Stop reason: SURG

## 2024-08-08 RX ORDER — HYDRALAZINE HYDROCHLORIDE 20 MG/ML
5 INJECTION INTRAMUSCULAR; INTRAVENOUS
Status: DISCONTINUED | OUTPATIENT
Start: 2024-08-08 | End: 2024-08-08 | Stop reason: HOSPADM

## 2024-08-08 RX ORDER — FERROUS SULFATE 324(65)MG
324 TABLET, DELAYED RELEASE (ENTERIC COATED) ORAL
COMMUNITY

## 2024-08-08 RX ORDER — PROMETHAZINE HYDROCHLORIDE 12.5 MG/1
25 TABLET ORAL ONCE AS NEEDED
Status: DISCONTINUED | OUTPATIENT
Start: 2024-08-08 | End: 2024-08-08 | Stop reason: HOSPADM

## 2024-08-08 RX ORDER — PROPOFOL 10 MG/ML
VIAL (ML) INTRAVENOUS AS NEEDED
Status: DISCONTINUED | OUTPATIENT
Start: 2024-08-08 | End: 2024-08-08 | Stop reason: SURG

## 2024-08-08 RX ORDER — FAMOTIDINE 10 MG/ML
20 INJECTION, SOLUTION INTRAVENOUS ONCE
Status: COMPLETED | OUTPATIENT
Start: 2024-08-08 | End: 2024-08-08

## 2024-08-08 RX ORDER — SODIUM CHLORIDE, SODIUM LACTATE, POTASSIUM CHLORIDE, CALCIUM CHLORIDE 600; 310; 30; 20 MG/100ML; MG/100ML; MG/100ML; MG/100ML
9 INJECTION, SOLUTION INTRAVENOUS CONTINUOUS
Status: DISCONTINUED | OUTPATIENT
Start: 2024-08-08 | End: 2024-08-08 | Stop reason: HOSPADM

## 2024-08-08 RX ORDER — IPRATROPIUM BROMIDE AND ALBUTEROL SULFATE 2.5; .5 MG/3ML; MG/3ML
3 SOLUTION RESPIRATORY (INHALATION) ONCE
Status: COMPLETED | OUTPATIENT
Start: 2024-08-08 | End: 2024-08-08

## 2024-08-08 RX ORDER — PROMETHAZINE HYDROCHLORIDE 25 MG/1
25 SUPPOSITORY RECTAL ONCE AS NEEDED
Status: DISCONTINUED | OUTPATIENT
Start: 2024-08-08 | End: 2024-08-08 | Stop reason: HOSPADM

## 2024-08-08 RX ORDER — LABETALOL HYDROCHLORIDE 5 MG/ML
5 INJECTION, SOLUTION INTRAVENOUS
Status: DISCONTINUED | OUTPATIENT
Start: 2024-08-08 | End: 2024-08-08 | Stop reason: HOSPADM

## 2024-08-08 RX ORDER — ERYTHROMYCIN 5 MG/G
OINTMENT OPHTHALMIC 2 TIMES DAILY
Qty: 3.5 G | Refills: 1 | Status: SHIPPED | OUTPATIENT
Start: 2024-08-08

## 2024-08-08 RX ORDER — DEXAMETHASONE SODIUM PHOSPHATE 4 MG/ML
INJECTION, SOLUTION INTRA-ARTICULAR; INTRALESIONAL; INTRAMUSCULAR; INTRAVENOUS; SOFT TISSUE AS NEEDED
Status: DISCONTINUED | OUTPATIENT
Start: 2024-08-08 | End: 2024-08-08 | Stop reason: SURG

## 2024-08-08 RX ORDER — MIDAZOLAM HYDROCHLORIDE 1 MG/ML
1 INJECTION INTRAMUSCULAR; INTRAVENOUS
Status: DISCONTINUED | OUTPATIENT
Start: 2024-08-08 | End: 2024-08-08 | Stop reason: HOSPADM

## 2024-08-08 RX ORDER — SODIUM CHLORIDE 0.9 % (FLUSH) 0.9 %
3-10 SYRINGE (ML) INJECTION AS NEEDED
Status: DISCONTINUED | OUTPATIENT
Start: 2024-08-08 | End: 2024-08-08 | Stop reason: HOSPADM

## 2024-08-08 RX ORDER — ONDANSETRON 2 MG/ML
4 INJECTION INTRAMUSCULAR; INTRAVENOUS ONCE AS NEEDED
Status: DISCONTINUED | OUTPATIENT
Start: 2024-08-08 | End: 2024-08-08 | Stop reason: HOSPADM

## 2024-08-08 RX ORDER — ONDANSETRON 2 MG/ML
INJECTION INTRAMUSCULAR; INTRAVENOUS AS NEEDED
Status: DISCONTINUED | OUTPATIENT
Start: 2024-08-08 | End: 2024-08-08 | Stop reason: SURG

## 2024-08-08 RX ORDER — FENTANYL CITRATE 50 UG/ML
25 INJECTION, SOLUTION INTRAMUSCULAR; INTRAVENOUS
Status: DISCONTINUED | OUTPATIENT
Start: 2024-08-08 | End: 2024-08-08 | Stop reason: HOSPADM

## 2024-08-08 RX ORDER — SODIUM CHLORIDE 0.9 % (FLUSH) 0.9 %
3 SYRINGE (ML) INJECTION EVERY 12 HOURS SCHEDULED
Status: DISCONTINUED | OUTPATIENT
Start: 2024-08-08 | End: 2024-08-08 | Stop reason: HOSPADM

## 2024-08-08 RX ORDER — NALOXONE HCL 0.4 MG/ML
0.2 VIAL (ML) INJECTION AS NEEDED
Status: DISCONTINUED | OUTPATIENT
Start: 2024-08-08 | End: 2024-08-08 | Stop reason: HOSPADM

## 2024-08-08 RX ADMIN — LIDOCAINE HYDROCHLORIDE 100 MG: 20 INJECTION, SOLUTION INFILTRATION; PERINEURAL at 09:23

## 2024-08-08 RX ADMIN — FAMOTIDINE 20 MG: 10 INJECTION, SOLUTION INTRAVENOUS at 07:59

## 2024-08-08 RX ADMIN — PROPOFOL 150 MG: 10 INJECTION, EMULSION INTRAVENOUS at 09:23

## 2024-08-08 RX ADMIN — HYDROMORPHONE HYDROCHLORIDE 0.5 MG: 1 INJECTION, SOLUTION INTRAMUSCULAR; INTRAVENOUS; SUBCUTANEOUS at 09:38

## 2024-08-08 RX ADMIN — CEFAZOLIN 2000 MG: 2 INJECTION, POWDER, FOR SOLUTION INTRAMUSCULAR; INTRAVENOUS at 09:10

## 2024-08-08 RX ADMIN — ONDANSETRON 4 MG: 2 INJECTION INTRAMUSCULAR; INTRAVENOUS at 09:54

## 2024-08-08 RX ADMIN — IPRATROPIUM BROMIDE AND ALBUTEROL SULFATE 3 ML: 2.5; .5 SOLUTION RESPIRATORY (INHALATION) at 08:08

## 2024-08-08 RX ADMIN — EPHEDRINE SULFATE 5 MG: 50 INJECTION INTRAVENOUS at 09:54

## 2024-08-08 RX ADMIN — DEXAMETHASONE SODIUM PHOSPHATE 6 MG: 4 INJECTION, SOLUTION INTRAMUSCULAR; INTRAVENOUS at 09:27

## 2024-08-08 RX ADMIN — SODIUM CHLORIDE, POTASSIUM CHLORIDE, SODIUM LACTATE AND CALCIUM CHLORIDE 9 ML/HR: 600; 310; 30; 20 INJECTION, SOLUTION INTRAVENOUS at 07:56

## 2024-08-08 NOTE — ANESTHESIA POSTPROCEDURE EVALUATION
Patient: Jose Villafuerte    Procedure Summary       Date: 08/08/24 Room / Location: [facility] OR 03 / [facility] MAIN OR    Anesthesia Start: 0917 Anesthesia Stop: 1011    Procedures:       RIGHT UPPER LID MULLERECTOMY (Right: Eye)      RIGHT UPPER LID BLEPHAROPLASTY (Right: Eye)      RIGHT LOWER LID ECTROPION REPAIR (Right: Eye) Diagnosis:     Surgeons: Ever Ortiz MD Provider: Tee Hendrix MD    Anesthesia Type: Not recorded ASA Status: 3            Anesthesia Type: No value filed.    Vitals  Vitals Value Taken Time   /65 08/08/24 1031   Temp 36.2 °C (97.1 °F) 08/08/24 1007   Pulse 72 08/08/24 1041   Resp 16 08/08/24 1031   SpO2 98 % 08/08/24 1041   Vitals shown include unfiled device data.        Post Anesthesia Care and Evaluation    Patient location during evaluation: PACU  Patient participation: complete - patient participated  Level of consciousness: awake  Pain management: satisfactory to patient    Airway patency: patent  Anesthetic complications: No anesthetic complications  PONV Status: controlled  Cardiovascular status: acceptable  Respiratory status: acceptable  Hydration status: acceptable

## 2024-08-08 NOTE — OP NOTE
OPERATIVE NOTE    Patient Identification:  Name: Jose Villafuerte  Age: 74 y.o.  Sex: male  :  1950  MRN: [id number]                                               Preoperative diagnosis: Right upper lid ptosis, right upper eyelid dermatochalasis, right lower eyelid ectropion  Postoperative diagnosis: same  Procedure: 1) Right upper lid mullerectomy          2) Right upper eyelid blepharoplasty                     3) Right lower eyelid ectropion repair  Surgeon: Dr. Ortiz who was present and scrubbed throughout all critical portions of the operation  Assistants: none   Anesthesia: General  EBL: less than 50 mL.  Specimens:  * No orders in the log *    Description of the procedure: The patient was taken to the operating room and placed on the table in the supine position, where anesthesia was induced. 2% lidocaine with epinephrine and 0.5% marcaine in a 1:1 fashion was injected over the surgical site, and the patient was prepped and draped in the usual manner for orbitofacial surgery.     Corneal protectors were placed in both eyes.               We began with the blepharoplasty  A 15 Bard-Teto blade incision was made 8 mm from the eyelash margin, across the entire horizontal extent of the right upper eyelid. A second incision was made 12 mm inferior to the junction of the brow and eyelid, and a pinch test was used to ensure the amount of skin excision was appropriate. The intervening tissue was excised with a 15 Bard-Teto blade and Debo scissors. Excessive orbicularis tissue was removed. The orbital septum was opened horizontally, and excessive fatty tissue was also removed. Bleeding was controlled with electrocauterization.                    At this stage we turned to the mullerectomy  A 5-0 silk everting suture was used to juan diego the right upper eyelid.  A small desmarres retractor was used to help juan diego the upper eyelid to view the superior border of the tarsal plate.  A caliper on 4mm was used  to jyotsna from the tarsal border onto conjunctiva and the area was infiltrated with local anesthetic.  A 5-0 silk suture was passed in mattress fasihon along the length of the jyotsna engaging ramos's muscle and conjunctiva.  A Putterman clamp was placed on the tissue between tarsal border and the silk suture and secured.  A running 5-0 fast absorbing plain gut suture was passed externally and run lateral to medial and then medial to lateral at the inferior border of the clamp and externatlized.  A 15 blade was used to remove the clamped tissue and then the plain gut was tied.  The lid was noted to lift properly after the procedure     The skin was then closed with 5-0 fast absorbing suture in an interrupted and running fashion, with care to incorporate the prestarsal orbicularis over the pupil, nasal and temporal limbi respectively. The lid position and contour were examined and found to be satisfactory.     Lastly we turned to the ectropion repair  A 15 Bard-Teto blade incision was made at the right lateral canthus. Sharp dissection was carried down to the lateral orbital rim periosteum. The inferior ramus of the lateral canthal tendon was identified and severed with sharp dissection. A full-thickness en bloc excision of the lateral aspect of the tarsal plate was carried out with sharp dissection, and bleeding was controlled with electrocauterization. The cut edge of the tarsal plate was advanced to the lateral orbital rim periosteum, where it was sutured with 5-0 vicryl suture to the internal aspect of the lateral orbital rim periosteum. The skin  was closed with 5-0 fast absorbing suture.     The corneal protectors were removed and antibiotic ophthalmic ointment was placed over the surgical site.     The patient was then awakened and taken from the operating room in good condition, having tolerated the procedure well. There were no complications, and the estimated blood loss was less than 50 cc.
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
## 2024-08-08 NOTE — ANESTHESIA PROCEDURE NOTES
Airway  Urgency: elective    Date/Time: 8/8/2024 9:25 AM  Airway not difficult    General Information and Staff    Patient location during procedure: OR  Anesthesiologist: Tee Hendrix MD  CRNA/CAA: Christine Pelaez CRNA    Indications and Patient Condition  Indications for airway management: airway protection    Preoxygenated: yes  MILS maintained throughout  Mask difficulty assessment: 0 - not attempted    Final Airway Details  Final airway type: supraglottic airway      Successful airway: classic  Size 4     Number of attempts at approach: 1  Assessment: lips, teeth, and gum same as pre-op and atraumatic intubation

## 2024-08-20 ENCOUNTER — PREP FOR SURGERY (OUTPATIENT)
Dept: OTHER | Facility: HOSPITAL | Age: 74
End: 2024-08-20
Payer: MEDICARE

## 2024-08-20 DIAGNOSIS — M54.16 LUMBAR RADICULOPATHY: Primary | ICD-10-CM

## 2024-08-20 DIAGNOSIS — M48.062 SPINAL STENOSIS OF LUMBAR REGION WITH NEUROGENIC CLAUDICATION: ICD-10-CM

## 2024-08-20 RX ORDER — DIAZEPAM 5 MG/1
10 TABLET ORAL ONCE
Status: CANCELLED | OUTPATIENT
Start: 2024-08-21

## 2024-08-20 RX ORDER — SODIUM CHLORIDE 0.9 % (FLUSH) 0.9 %
1-10 SYRINGE (ML) INJECTION AS NEEDED
Status: CANCELLED | OUTPATIENT
Start: 2024-08-20

## 2024-08-20 RX ORDER — LIDOCAINE HYDROCHLORIDE 10 MG/ML
5 INJECTION, SOLUTION INFILTRATION; PERINEURAL ONCE
Status: CANCELLED | OUTPATIENT
Start: 2024-08-21

## 2024-08-20 RX ORDER — IOPAMIDOL 408 MG/ML
12 INJECTION, SOLUTION INTRATHECAL
Status: CANCELLED | OUTPATIENT
Start: 2024-08-21

## 2024-08-20 RX ORDER — BUPIVACAINE HYDROCHLORIDE 5 MG/ML
10 INJECTION, SOLUTION EPIDURAL; INTRACAUDAL ONCE
Status: CANCELLED | OUTPATIENT
Start: 2024-08-21

## 2024-08-20 RX ORDER — METHYLPREDNISOLONE ACETATE 80 MG/ML
80 INJECTION, SUSPENSION INTRA-ARTICULAR; INTRALESIONAL; INTRAMUSCULAR; SOFT TISSUE ONCE
Status: CANCELLED | OUTPATIENT
Start: 2024-08-21

## 2024-08-21 ENCOUNTER — HOSPITAL ENCOUNTER (OUTPATIENT)
Dept: PAIN MEDICINE | Facility: HOSPITAL | Age: 74
Discharge: HOME | End: 2024-08-21
Payer: MEDICARE

## 2024-08-21 ENCOUNTER — HOSPITAL ENCOUNTER (OUTPATIENT)
Dept: GENERAL RADIOLOGY | Facility: HOSPITAL | Age: 74
Discharge: HOME | End: 2024-08-21
Payer: MEDICARE

## 2024-08-21 VITALS
SYSTOLIC BLOOD PRESSURE: 141 MMHG | TEMPERATURE: 97.3 F | RESPIRATION RATE: 16 BRPM | HEART RATE: 62 BPM | DIASTOLIC BLOOD PRESSURE: 71 MMHG | OXYGEN SATURATION: 97 %

## 2024-08-21 DIAGNOSIS — M54.16 LUMBAR RADICULOPATHY: ICD-10-CM

## 2024-08-21 DIAGNOSIS — M54.42 LEFT-SIDED LOW BACK PAIN WITH LEFT-SIDED SCIATICA, UNSPECIFIED CHRONICITY: ICD-10-CM

## 2024-08-21 DIAGNOSIS — M48.062 SPINAL STENOSIS OF LUMBAR REGION WITH NEUROGENIC CLAUDICATION: ICD-10-CM

## 2024-08-21 DIAGNOSIS — R52 PAIN: ICD-10-CM

## 2024-08-21 PROCEDURE — 25010000002 BUPIVACAINE (PF) 0.5 % SOLUTION: Performed by: ANESTHESIOLOGY

## 2024-08-21 PROCEDURE — 64484 NJX AA&/STRD TFRM EPI L/S EA: CPT | Performed by: ANESTHESIOLOGY

## 2024-08-21 PROCEDURE — A9270 NON-COVERED ITEM OR SERVICE: HCPCS | Performed by: ANESTHESIOLOGY

## 2024-08-21 PROCEDURE — 77003 FLUOROGUIDE FOR SPINE INJECT: CPT

## 2024-08-21 PROCEDURE — 25510000001 IOPAMIDOL 41 % SOLUTION: Performed by: ANESTHESIOLOGY

## 2024-08-21 PROCEDURE — 64483 NJX AA&/STRD TFRM EPI L/S 1: CPT | Performed by: ANESTHESIOLOGY

## 2024-08-21 PROCEDURE — 25010000002 METHYLPREDNISOLONE PER 80 MG: Performed by: ANESTHESIOLOGY

## 2024-08-21 PROCEDURE — 63710000001 DIAZEPAM 5 MG TABLET: Performed by: ANESTHESIOLOGY

## 2024-08-21 RX ORDER — DIAZEPAM 5 MG/1
10 TABLET ORAL ONCE
Status: COMPLETED | OUTPATIENT
Start: 2024-08-21 | End: 2024-08-21

## 2024-08-21 RX ORDER — ASPIRIN 81 MG/1
81 TABLET ORAL DAILY
COMMUNITY

## 2024-08-21 RX ORDER — METHYLPREDNISOLONE ACETATE 80 MG/ML
80 INJECTION, SUSPENSION INTRA-ARTICULAR; INTRALESIONAL; INTRAMUSCULAR; SOFT TISSUE ONCE
Status: COMPLETED | OUTPATIENT
Start: 2024-08-21 | End: 2024-08-21

## 2024-08-21 RX ORDER — BUPIVACAINE HYDROCHLORIDE 5 MG/ML
10 INJECTION, SOLUTION EPIDURAL; INTRACAUDAL ONCE
Status: COMPLETED | OUTPATIENT
Start: 2024-08-21 | End: 2024-08-21

## 2024-08-21 RX ORDER — IOPAMIDOL 408 MG/ML
12 INJECTION, SOLUTION INTRATHECAL
Status: COMPLETED | OUTPATIENT
Start: 2024-08-21 | End: 2024-08-21

## 2024-08-21 RX ORDER — LIDOCAINE HYDROCHLORIDE 10 MG/ML
5 INJECTION, SOLUTION INFILTRATION; PERINEURAL ONCE
Status: COMPLETED | OUTPATIENT
Start: 2024-08-21 | End: 2024-08-21

## 2024-08-21 RX ORDER — SODIUM CHLORIDE 0.9 % (FLUSH) 0.9 %
1-10 SYRINGE (ML) INJECTION AS NEEDED
Status: DISCONTINUED | OUTPATIENT
Start: 2024-08-21 | End: 2024-08-22 | Stop reason: HOSPADM

## 2024-08-21 RX ADMIN — BUPIVACAINE HYDROCHLORIDE 10 ML: 5 INJECTION, SOLUTION EPIDURAL; INTRACAUDAL; PERINEURAL at 10:27

## 2024-08-21 RX ADMIN — METHYLPREDNISOLONE ACETATE 80 MG: 80 INJECTION, SUSPENSION INTRA-ARTICULAR; INTRALESIONAL; INTRAMUSCULAR; SOFT TISSUE at 10:27

## 2024-08-21 RX ADMIN — DIAZEPAM 10 MG: 5 TABLET ORAL at 09:39

## 2024-08-21 RX ADMIN — LIDOCAINE HYDROCHLORIDE 5 ML: 10 INJECTION, SOLUTION EPIDURAL; INFILTRATION; INTRACAUDAL; PERINEURAL at 10:27

## 2024-08-21 RX ADMIN — IOPAMIDOL 10 ML: 408 INJECTION, SOLUTION INTRATHECAL at 10:31

## 2024-08-21 NOTE — PROCEDURES
Procedures    LTFESI with S1 TFESI  [facility]    PREOPERATIVE DIAGNOSIS:   left Lumbar Radiculopathy    POSTOPERATIVE DIAGNOSIS: Same as preop dx    PROCEDURE:    97980 --  Diagnostic  Transforaminal Epidural Steroid Injection at the  Left  S1 Level  03528 -- S1 Transforaminal Epidural Steroid Injection on the Left    PRE-PROCEDURE DISCUSSION WITH PATIENT:    Risks and complications were discussed with the patient prior to starting the procedure and informed consent was obtained.    Preprocedure assessment/presedation assessment is noted as the H&P/H&P update as part of this Epic chart encounter.  Preassessment plan and preprocedure airway assessment are noted.  Immediate in the room airway assessment is unchanged from the preprocedure assessment performed in the preoperative area a few minutes ago.      SURGEON:  Steven Corcoran MD    REASON FOR PROCEDURE:     Previous clinically significant therapeutic effect is noted., Degenerative changes are noted in the area., Radiating pattern of pain is likely consistent with degenerative changes in the area., and Radicular pain pattern seems consistent with this dermatome.    SEDATION:  The patient had higher than average levels of procedural anxiety and the need to provide additional procedural sedation was needed to safely proceed. and he received diazepam 10 mg by mouth in the preoperative area  ANESTHETIC: Marcaine 0.25%  STEROID:     Methylprednisolone (DEPO MEDROL) 80mg/ml  TOTAL VOLUME OF SOLUTION:   4mL    DESCRIPTON OF PROCEDURE:  After obtaining informed consent, an I.V. was not started in the preoperative area. The patient taken to the operating room and was placed in the prone position with a pillow under the abdomen.  All pressure points were well padded.  EKG, blood pressure, and pulse oximeter were monitored.  The lumbosacral area was prepped with Chloraprep and draped in a sterile fashion. Under fluoroscopic guidance in an oblique dimension,  the transverse process of the above mentioned Lumbar vertebra at the junction of the body at 6 o'clock position respective to the pedicle on the aforementioned side was identified. Skin and subcutaneous tissue was anesthetized with 2-3mL of 1% lidocaine. A 22-gauge spinal needle was introduced under fluoroscopic guidance at the above junction into the foramen without parasthesias and into the epidural space. After confirming the position of the needle with PA, lateral, and oblique fluoroscopic views, aspiration was checked and was clear of blood or CSF.  Next, 1 mL of contrast dye was injected. After seeing adequate spread on the corresponding nerve root, a total volume 2 mL of injectate containing half of the previously mentioned local anesthetic solution was slowly and easily injected into the space.    The needle was removed intact.  Vital signs were stable.      Next, the S1 posterior foramen was identified on the above mentioned side with fluoroscopy in a PA dimension, and a spinal needle was introduced to just caudad to the 6 o’clock position of the foramen until contact with os; then the needle was walked off cephalad and into the foramen.   After confirming the position of the needle with PA and lateral fluoroscopic views, aspiration was checked and was clear of blood or CSF.  Next, 1 mL of contrast dye was injected. After seeing adequate spread on the S1 nerve root and into the caudal epidural space, a total volume 2mL of injectate containing the other half of the local anesthetic solution was easily and slowly injected into the epidural space.   The needle was removed intact.  Vital signs were stable.  Onset of analgesia was noted prior to transport to the recovery area.      ESTIMATED BLOOD LOSS:  <5 mL  SPECIMENS:  none    COMPLICATIONS:   No complications were noted., There was no indication of vascular uptake on live injection of contrast dye., There was no indication of intrathecal uptake on live  injection of contrast dye., There was not any evidence of dural puncture.  , and The patient did not have any signs of postprocedure numbness nor weakness.    TOLERANCE & DISCHARGE CONDITION:    The patient tolerated the procedure well.  The patient was transported to the recovery area without difficulties.  The patient was discharged to home under the care of family in stable and satisfactory condition.    PLAN OF CARE:  The patient was given our standard instruction sheet.  The patient will Return to clinic 6 wks  The patient will resume all medications as per the medication reconciliation sheet.

## 2024-08-21 NOTE — INTERVAL H&P NOTE
H&P reviewed. The patient was examined and there are no changes to the H&P.    Mallampati:   II (hard and soft palate, upper portion of tonsils anduvula visible)    ASA Physical Status Classification: ASA 3 - Patient with moderate systemic disease with functional limitations    Preprocedure/preassessment plan is as noted.  Diazapam oral.

## 2024-08-21 NOTE — DISCHARGE INSTRUCTIONS
[facility] - Post-procedure Instructions          --  While there are no absolute restrictions, it is recommended that you do not perform strenuous activity today. In the morning, you may resume your level of activity as before your block.    --  If you have a band-aid at your injection site, please remove it later today. Observe the area for any redness, swelling, pus-like drainage, or a temperature over 101°. If any of these symptoms occur, please call your doctor at [phone]. If after office hours, leave a message and the on-call provider will return your call.    --  Ice may be applied to your injection site. It is recommended you avoid direct heat (heating pad; hot tub) for 1-2 days.    --  Call [facility] at [phone] if you experience persistent headache, persistent bleeding from the injection site, or severe pain not relieved by heat or oral medication.    --  Do not make important decisions today.    --  Due to the effects of the block and/or the I.V. Sedation, DO NOT drive or operate hazardous machinery for 12 hours.  Local anesthetics may cause numbness after procedure and precautions must be taken with regards to operating equipment as well as with walking, even if ambulating with assistance of another person or with an assistive device.    --  Do not drink alcohol for 12 hours.    -- You may return to work tomorrow, or as directed by your referring doctor.    --  Occasionally you may notice a slight increase in your pain after the procedure. This should start to improve within the next 24-48 hours. Radiofrequency ablation procedure pain may last 3-4 weeks.    --  It may take as long as 3-4 days before you notice a gradual improvement in your pain and/or other symptoms.    -- You may continue to take your prescribed pain medication as needed.    --  Some normal possible side effects of steroid use could include fluid retention, increased blood sugar, dull headache,  increased sweating, increased appetite, mood swings and flushing.    --  Diabetics are recommended to watch their blood glucose level closely for 24-48 hours after the injection.    --  Must stay in PACU for 20 min upon arrival and prove no leg weakness before being discharged.    --  IN THE EVENT OF A LIFE THREATENING EMERGENCY, (CHEST PAIN, BREATHING DIFFICULTIES, PARALYSIS…) YOU SHOULD GO TO YOUR NEAREST EMERGENCY ROOM.    --  You should be contacted by our office within 2-3 days to schedule follow up or next appointment date.  If not contacted within 7 days, please call the office at [phone]

## 2024-09-25 ENCOUNTER — HOSPITAL ENCOUNTER (OUTPATIENT)
Facility: SURGERY CENTER | Age: 74
Setting detail: HOSPITAL OUTPATIENT SURGERY
Discharge: HOME | End: 2024-09-25
Attending: INTERNAL MEDICINE | Admitting: INTERNAL MEDICINE
Payer: MEDICARE

## 2024-09-25 ENCOUNTER — ANESTHESIA (OUTPATIENT)
Dept: SURGERY | Facility: SURGERY CENTER | Age: 74
End: 2024-09-25
Payer: MEDICARE

## 2024-09-25 ENCOUNTER — ANESTHESIA EVENT (OUTPATIENT)
Dept: SURGERY | Facility: SURGERY CENTER | Age: 74
End: 2024-09-25
Payer: MEDICARE

## 2024-09-25 VITALS
WEIGHT: 125 LBS | BODY MASS INDEX: 19.62 KG/M2 | SYSTOLIC BLOOD PRESSURE: 144 MMHG | OXYGEN SATURATION: 97 % | RESPIRATION RATE: 16 BRPM | HEART RATE: 64 BPM | DIASTOLIC BLOOD PRESSURE: 72 MMHG | TEMPERATURE: 98 F | HEIGHT: 67 IN

## 2024-09-25 DIAGNOSIS — Z12.11 ENCOUNTER FOR SCREENING FOR MALIGNANT NEOPLASM OF COLON: ICD-10-CM

## 2024-09-25 PROCEDURE — 25810000003 LACTATED RINGERS PER 1000 ML: Performed by: INTERNAL MEDICINE

## 2024-09-25 PROCEDURE — 45385 COLONOSCOPY W/LESION REMOVAL: CPT | Performed by: INTERNAL MEDICINE

## 2024-09-25 PROCEDURE — 45380 COLONOSCOPY AND BIOPSY: CPT | Performed by: INTERNAL MEDICINE

## 2024-09-25 PROCEDURE — 88305 TISSUE EXAM BY PATHOLOGIST: CPT | Performed by: INTERNAL MEDICINE

## 2024-09-25 PROCEDURE — 25010000002 PROPOFOL 10 MG/ML EMULSION: Performed by: NURSE ANESTHETIST, CERTIFIED REGISTERED

## 2024-09-25 PROCEDURE — 25010000002 PROPOFOL 1000 MG/100ML EMULSION: Performed by: NURSE ANESTHETIST, CERTIFIED REGISTERED

## 2024-09-25 RX ORDER — PROPOFOL 10 MG/ML
INJECTION, EMULSION INTRAVENOUS AS NEEDED
Status: DISCONTINUED | OUTPATIENT
Start: 2024-09-25 | End: 2024-09-25 | Stop reason: SURG

## 2024-09-25 RX ORDER — LIDOCAINE HYDROCHLORIDE 20 MG/ML
INJECTION, SOLUTION EPIDURAL; INFILTRATION; INTRACAUDAL; PERINEURAL AS NEEDED
Status: DISCONTINUED | OUTPATIENT
Start: 2024-09-25 | End: 2024-09-25 | Stop reason: SURG

## 2024-09-25 RX ORDER — SODIUM CHLORIDE 0.9 % (FLUSH) 0.9 %
10 SYRINGE (ML) INJECTION AS NEEDED
Status: DISCONTINUED | OUTPATIENT
Start: 2024-09-25 | End: 2024-09-25 | Stop reason: HOSPADM

## 2024-09-25 RX ORDER — SODIUM CHLORIDE, SODIUM LACTATE, POTASSIUM CHLORIDE, CALCIUM CHLORIDE 600; 310; 30; 20 MG/100ML; MG/100ML; MG/100ML; MG/100ML
30 INJECTION, SOLUTION INTRAVENOUS CONTINUOUS PRN
Status: DISCONTINUED | OUTPATIENT
Start: 2024-09-25 | End: 2024-09-25 | Stop reason: HOSPADM

## 2024-09-25 RX ORDER — SODIUM CHLORIDE 0.9 % (FLUSH) 0.9 %
3 SYRINGE (ML) INJECTION EVERY 12 HOURS SCHEDULED
Status: DISCONTINUED | OUTPATIENT
Start: 2024-09-25 | End: 2024-09-25 | Stop reason: HOSPADM

## 2024-09-25 RX ADMIN — LIDOCAINE HYDROCHLORIDE 60 MG: 20 INJECTION, SOLUTION EPIDURAL; INFILTRATION; INTRACAUDAL; PERINEURAL at 11:27

## 2024-09-25 RX ADMIN — PROPOFOL 200 MCG/KG/MIN: 10 INJECTION, EMULSION INTRAVENOUS at 11:27

## 2024-09-25 RX ADMIN — SODIUM CHLORIDE, POTASSIUM CHLORIDE, SODIUM LACTATE AND CALCIUM CHLORIDE 30 ML/HR: 600; 310; 30; 20 INJECTION, SOLUTION INTRAVENOUS at 10:23

## 2024-09-25 RX ADMIN — PROPOFOL 60 MG: 10 INJECTION, EMULSION INTRAVENOUS at 11:27
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
## 2024-10-04 ENCOUNTER — OFFICE VISIT (OUTPATIENT)
Dept: PAIN MEDICINE | Facility: CLINIC | Age: 74
End: 2024-10-04
Payer: MEDICARE

## 2024-10-04 ENCOUNTER — PREP FOR SURGERY (OUTPATIENT)
Dept: SURGERY | Facility: SURGERY CENTER | Age: 74
End: 2024-10-04
Payer: MEDICARE

## 2024-10-04 VITALS
TEMPERATURE: 97.5 F | WEIGHT: 126.4 LBS | HEIGHT: 67 IN | SYSTOLIC BLOOD PRESSURE: 171 MMHG | RESPIRATION RATE: 20 BRPM | BODY MASS INDEX: 19.84 KG/M2 | OXYGEN SATURATION: 96 % | DIASTOLIC BLOOD PRESSURE: 69 MMHG | HEART RATE: 84 BPM

## 2024-10-04 DIAGNOSIS — M54.42 LEFT-SIDED LOW BACK PAIN WITH LEFT-SIDED SCIATICA, UNSPECIFIED CHRONICITY: ICD-10-CM

## 2024-10-04 DIAGNOSIS — M54.16 LUMBAR RADICULOPATHY: Primary | ICD-10-CM

## 2024-10-04 DIAGNOSIS — M48.062 SPINAL STENOSIS OF LUMBAR REGION WITH NEUROGENIC CLAUDICATION: ICD-10-CM

## 2024-10-04 DIAGNOSIS — M54.16 LUMBAR RADICULOPATHY: ICD-10-CM

## 2024-10-04 DIAGNOSIS — M48.062 SPINAL STENOSIS OF LUMBAR REGION WITH NEUROGENIC CLAUDICATION: Primary | ICD-10-CM

## 2024-10-04 PROCEDURE — 1160F RVW MEDS BY RX/DR IN RCRD: CPT

## 2024-10-04 PROCEDURE — 3077F SYST BP >= 140 MM HG: CPT

## 2024-10-04 PROCEDURE — 99213 OFFICE O/P EST LOW 20 MIN: CPT

## 2024-10-04 PROCEDURE — 1159F MED LIST DOCD IN RCRD: CPT

## 2024-10-04 PROCEDURE — 3078F DIAST BP <80 MM HG: CPT

## 2024-10-04 PROCEDURE — 1125F AMNT PAIN NOTED PAIN PRSNT: CPT

## 2024-10-04 RX ORDER — DIAZEPAM 5 MG
10 TABLET ORAL ONCE
OUTPATIENT
Start: 2024-10-04

## 2024-10-04 NOTE — PROGRESS NOTES
CHIEF COMPLAINT  Back pain    Subjective   Jose Villafuerte is a 74 y.o. male  who presents to the office for follow-up of procedure.  He completed a Left S1 TF LESI on  8/21/24 performed by Dr. Corcoran for management of low back and left leg pain. Patient note pre-procedural pain score was a 7/10VAS and post procedural pain score was 0/10 VAS. Patient reports 50% ongoing relief from the procedure. Following the procedure, he notes that he is able to be more active around his house without experiencing increased pain. Pain is gradually starting to worsen and he would like to discuss scheduling his next procedure.     Today pain is 8/10VAS in severity. Pain is located in his left buttock and radiates into left hip and down left posterior leg. Denies radicular pain to right leg. Describes this pain as a nearly continuous aching and burning. Pain is worsened by prolonged standing or sitting. Pain improves with rest/reposition. Patient reports that he uses the treadmill and an inversion table when able.     Patient has been evaluated by Dr. Mackey in the past who recommended a left L5-S1 laminectomy, foraminotomies, and medial facetectomy with [facility]. Dr. Mackey declined moving forward with operative intervention secondary to patient's history of alcohol abuse.  Dr. Corcoran notes patient is not a good candidate for dorsal column stimulation. Patient wishes to avoid opioid medication and notes that epidurals manage his pain well. Epidural injections every 3-4 months offer significant pain relief and the patient to be more active.      Continues to take Aspirin 81mg for aortic atherosclerosis.      Procedures:  8/21/24 - Left S1 TF LESI - 50% ongoing relief   5/7/24 - Left L5 & S1 TF LESI - 70% relief x 1 month, 50% for almost 2 months  10/3/23 - Left L5 & S1 TF LESI - 40% relief x 3 months  7/6/23 - Left L5 & S1 TF TOM - 75% relief x 3 months  12/13/23 - Left SI joint and left piriformis injection - 50% relief x 6 weeks    9/7/22 - Left Piriformis injection - 50% relief x 4 weeks  5/25/22 - Left L2 & L5 TF TOM     Back Pain  This is a chronic problem. The current episode started more than 1 year ago. The problem occurs constantly. The problem has been comes and goes since onset. The pain is present in the lumbar spine, sacro-iliac and gluteal. The quality of the pain is described as aching and burning. The pain radiates to the left thigh (Radiates into left buttock and down left posterior leg stopping at the knee). The pain is at a severity of 8/10. The pain is moderate. The symptoms are aggravated by standing, sitting and position (prolonged position). Pertinent negatives include no abdominal pain, chest pain, dysuria, fever, headaches, numbness or weakness. He has tried bed rest, heat, ice, walking, NSAIDs and muscle relaxant for the symptoms.     PEG Assessment   What number best describes your pain on average in the past week?8  What number best describes how, during the past week, pain has interfered with your enjoyment of life?0  What number best describes how, during the past week, pain has interfered with your general activity?  0    Review of Pertinent Medical Data ---      The following portions of the patient's history were reviewed and updated as appropriate: allergies, current medications, past family history, past medical history, past social history, past surgical history, and problem list.    Review of Systems   Constitutional:  Negative for activity change, fatigue and fever.   HENT:  Positive for congestion.    Respiratory:  Positive for cough and shortness of breath. Negative for chest tightness.    Cardiovascular:  Negative for chest pain.   Gastrointestinal:  Negative for abdominal pain, constipation and diarrhea.   Genitourinary:  Negative for dysuria.   Musculoskeletal:  Positive for back pain.   Neurological:  Negative for dizziness, weakness, light-headedness, numbness and headaches.  "  Psychiatric/Behavioral:  Negative for agitation, sleep disturbance and suicidal ideas. The patient is not nervous/anxious.      I have reviewed and confirmed the accuracy of the ROS as documented by the MA/LPN/RN JENNIFER Wade     Vitals:    10/04/24 1030   BP: 171/69   BP Location: Right arm   Patient Position: Sitting   Cuff Size: Adult   Pulse: 84   Resp: 20   Temp: 97.5 °F (36.4 °C)   TempSrc: Oral   SpO2: 96%   Weight: 57.3 kg (126 lb 6.4 oz)   Height: 170.2 cm (67\")   PainSc:   8     Objective   Physical Exam  Constitutional:       Appearance: Normal appearance.   HENT:      Head: Normocephalic.   Cardiovascular:      Rate and Rhythm: Normal rate and regular rhythm.   Pulmonary:      Effort: Pulmonary effort is normal.      Breath sounds: Normal breath sounds.   Musculoskeletal:         General: Normal range of motion.      Cervical back: Normal range of motion.      Lumbar back: Spasms and tenderness present. Positive left straight leg raise test.   Skin:     General: Skin is warm and dry.      Capillary Refill: Capillary refill takes less than 2 seconds.   Neurological:      General: No focal deficit present.      Mental Status: He is alert and oriented to person, place, and time.      Gait: Gait abnormal (slowed).   Psychiatric:         Mood and Affect: Mood normal.         Behavior: Behavior normal.         Thought Content: Thought content normal.         Cognition and Memory: Cognition normal.       Assessment & Plan   Diagnoses and all orders for this visit:    1. Spinal stenosis of lumbar region with neurogenic claudication (Primary)    2. Lumbar radiculopathy    3. Left-sided low back pain with left-sided sciatica, unspecified chronicity      Jose Villafuerte reports a pain score of 8.  Given his pain assessment as noted, treatment options were discussed and the following options were decided upon as a follow-up plan to address the patient's pain: continuation of current treatment plan " for pain and steroid injections.    --- Due to the amount of ongoing relief that patient receives from epidural injections, plan is to repeat every 3 months as needed. These injections offer pain relief and allow him to be more active. He will be scheudled to repeat a Left L5 & S1 TF LESI on/after 11/21/24. He will cancel/reschedule if procedure if not needed at that time.   ---  Indications for epidural injection:  Plan is to proceed with epidural at the appropriate level.  If the patient receives significant pain reduction and improvement in function and the plan will be to repeat the epidural when the pain worsens.  If a second epidural provides at least 6 weeks of sustained improvement that includes both pain reduction and improvement in function then an epidural injection could be repeated once again at the same level.  This is a mutual decision between the clinician and the patient that includes discussions including risks and benefits in detail as well as alternative therapies.  Patient's questions were answered to their satisfaction and to their understanding.  ---   Discussed with the patient that sedation is optional for this procedure.  The sedation offered is called conscious sedation which is different from general anesthesia that is utilized in surgical procedures. The dosing of the sedation is determined by the physician and they will be monitored throughout the procedure. With conscious sedation it is possible to remember parts or all of the procedure, this is normal. They will need to have a  with them as driving is prohibited following conscious sedation.      NPO instructions for conscious sedation:  --- Do not eat 6 hours prior to the procedure.   --- Do not drink any dairy or citrus 4 hours prior to the procedure.   --- Do not drink anything, including clear liquids, 2 hours prior to procedure.      If the NPO instructions are not followed then the procedure may be performed without  sedation or the procedure will need to be rescheduled.    --- Follow-up for procedure     YUNIEL REPORT  As the clinician, I personally reviewed the YUNIEL from 10/4/24 while the patient was in the office today.    Dictated utilizing Dragon dictation.

## 2024-10-11 ENCOUNTER — OFFICE VISIT (OUTPATIENT)
Dept: INTERNAL MEDICINE | Facility: CLINIC | Age: 74
End: 2024-10-11
Payer: MEDICARE

## 2024-10-11 VITALS
DIASTOLIC BLOOD PRESSURE: 70 MMHG | OXYGEN SATURATION: 98 % | HEIGHT: 67 IN | HEART RATE: 91 BPM | BODY MASS INDEX: 20.31 KG/M2 | SYSTOLIC BLOOD PRESSURE: 122 MMHG | WEIGHT: 129.4 LBS

## 2024-10-11 DIAGNOSIS — Z76.89 ENCOUNTER TO ESTABLISH CARE: Primary | ICD-10-CM

## 2024-10-11 DIAGNOSIS — M48.062 SPINAL STENOSIS OF LUMBAR REGION WITH NEUROGENIC CLAUDICATION: Chronic | ICD-10-CM

## 2024-10-11 DIAGNOSIS — F17.210 CIGARETTE NICOTINE DEPENDENCE WITHOUT COMPLICATION: Chronic | ICD-10-CM

## 2024-10-11 DIAGNOSIS — I25.10 ATHEROSCLEROSIS OF NATIVE CORONARY ARTERY OF NATIVE HEART WITHOUT ANGINA PECTORIS: Chronic | ICD-10-CM

## 2024-10-11 DIAGNOSIS — J43.1 PANLOBULAR EMPHYSEMA: Chronic | ICD-10-CM

## 2024-10-11 DIAGNOSIS — L84 CORNS AND CALLOSITY: ICD-10-CM

## 2024-10-11 DIAGNOSIS — F10.20 UNCOMPLICATED ALCOHOL DEPENDENCE: Chronic | ICD-10-CM

## 2024-10-11 DIAGNOSIS — I10 ESSENTIAL HYPERTENSION: Chronic | ICD-10-CM

## 2024-10-11 PROBLEM — K63.5 COLON POLYPS: Status: ACTIVE | Noted: 2024-10-11

## 2024-10-11 PROBLEM — Z12.11 ENCOUNTER FOR SCREENING FOR MALIGNANT NEOPLASM OF COLON: Status: RESOLVED | Noted: 2024-07-08 | Resolved: 2024-10-11

## 2024-10-11 PROCEDURE — 1125F AMNT PAIN NOTED PAIN PRSNT: CPT | Performed by: NURSE PRACTITIONER

## 2024-10-11 PROCEDURE — 3078F DIAST BP <80 MM HG: CPT | Performed by: NURSE PRACTITIONER

## 2024-10-11 PROCEDURE — G2211 COMPLEX E/M VISIT ADD ON: HCPCS | Performed by: NURSE PRACTITIONER

## 2024-10-11 PROCEDURE — 1159F MED LIST DOCD IN RCRD: CPT | Performed by: NURSE PRACTITIONER

## 2024-10-11 PROCEDURE — 3074F SYST BP LT 130 MM HG: CPT | Performed by: NURSE PRACTITIONER

## 2024-10-11 PROCEDURE — 1160F RVW MEDS BY RX/DR IN RCRD: CPT | Performed by: NURSE PRACTITIONER

## 2024-10-11 PROCEDURE — 99214 OFFICE O/P EST MOD 30 MIN: CPT | Performed by: NURSE PRACTITIONER

## 2024-10-11 RX ORDER — AMLODIPINE BESYLATE 10 MG/1
10 TABLET ORAL DAILY
Qty: 90 TABLET | Refills: 1 | Status: SHIPPED | OUTPATIENT
Start: 2024-10-11

## 2024-10-11 RX ORDER — LISINOPRIL 40 MG/1
40 TABLET ORAL DAILY
Qty: 90 TABLET | Refills: 1 | Status: SHIPPED | OUTPATIENT
Start: 2024-10-11

## 2024-10-11 NOTE — ASSESSMENT & PLAN NOTE
Hypertension is stable and controlled  Continue current treatment regimen.  Dietary sodium restriction.  Stop smoking.  Blood pressure will be reassessed in 6 months.    Continue lisinopril, norvasc

## 2024-10-11 NOTE — ASSESSMENT & PLAN NOTE
No complaint of CP  States uses treadmill at home.   He is on ASA.   Concern for statin is alcohol abuse.   Discussed with patient and will not take statin.

## 2024-10-11 NOTE — PROGRESS NOTES
Chief Complaint  Nail Problem (Patient stated he has a corn of middle toe next to big toe. For 3 years. ), Establish Care, and Hypertension     Subjective:      History of Present Illness {CC  Problem List  Visit  Diagnosis   Encounters  Notes  Medications  Labs  Result Review Imaging  Media :23}     Jose Villafuerte presents to [facility] for:      This patient was previously with Dr John Etienne who is no longer at this practice.    He is new to me and is here to establish care today.  The patient was last seen on:  Prior records reviewed.   The patient chronically has: hypertension, CAD (per CT), chronic LBP, hx skin cancer, alcohol abuse     Hypertension  This is a chronic problem. The current episode started more than 1 year ago. The problem is controlled. Risk factors for coronary artery disease include smoking/tobacco exposure and male gender (known CAD). Current antihypertension treatment includes ACE inhibitors and calcium channel blockers. Hypertensive end-organ damage includes CAD/MI.      Atherosclerosis on lung cancer screen.   CT Chest Low Dose Cancer Screening WO (04/13/2024 10:56 AM)   Heavy coronary artery calcification   sub-6 mm micronodules   Started ASA 81 mg   States uses treadmill at home. No CP     Smoking:   Current 2 1/2 PPD     AAA Screen Normal  [place] aaa screen limited (05/26/2021 11:00 AM)     Spinal stenosis: lumbar   Left S1 TF LESI on  8/21/24   States injections last 2-3 months for pain control.     Inversion table at home.     CRC screen: 9/25/24: [facility]   1.  Cecal polyp biopsy:  A. Tubular adenoma.  B. No high-grade dysplasia nor malignancy identified.     2.  Rectal polyps biopsy:  A. Fragments of hyperplastic polyp and developing hyperplastic polyp.  B. No glandular dysplasia nor malignancy identified.    Repeat: 3 years (about 9/25/2027)    Alcohol: 9-10 beers throughout the day (starts in am)   States doesn't get drunk -  "enjoys beer.     Wishes to see podiatry: corns on toes     : 1 son lives here in [place].     I have reviewed patient's medical history, any new submitted information provided by patient or medical assistant and updated medical record.      Objective:      Physical Exam  Vitals reviewed.   Constitutional:       Appearance: Normal appearance. He is well-developed.   Neck:      Thyroid: No thyromegaly.   Cardiovascular:      Rate and Rhythm: Normal rate and regular rhythm.      Pulses: Normal pulses.      Heart sounds: Normal heart sounds.   Pulmonary:      Effort: Pulmonary effort is normal.      Breath sounds: Rhonchi present.      Comments: E/U   Abdominal:      General: Bowel sounds are normal.   Musculoskeletal:      Cervical back: Normal range of motion and neck supple.      Right lower leg: No edema.      Left lower leg: No edema.   Lymphadenopathy:      Cervical: No cervical adenopathy.   Skin:     General: Skin is warm and dry.   Neurological:      Mental Status: He is alert and oriented to person, place, and time.   Psychiatric:         Mood and Affect: Mood normal.         Behavior: Behavior normal. Behavior is cooperative.         Thought Content: Thought content normal.         Judgment: Judgment normal.        Result Review  Data Reviewed:{ Labs  Result Review  Imaging  Med Tab  Media :23}     The following data was reviewed by: Esa Stewart III NP-C on 10/11/2024  Common labs          11/10/2023    11:15   Common Labs   Glucose 100    BUN 4    Creatinine 0.53    Sodium 130    Potassium 4.8    Chloride 93    Calcium 9.9    Total Cholesterol 173    Triglycerides 57    HDL Cholesterol 71    LDL Cholesterol  91    PSA 2.480             Vital Signs:   /70 (BP Location: Left arm, Patient Position: Sitting, Cuff Size: Adult)   Pulse 91   Ht 170.2 cm (67\")   Wt 58.7 kg (129 lb 6.4 oz)   SpO2 98%   BMI 20.27 kg/m²   Estimated body mass index is 20.27 kg/m² as calculated " "from the following:    Height as of this encounter: 170.2 cm (67\").    Weight as of this encounter: 58.7 kg (129 lb 6.4 oz).        Requested Prescriptions     Signed Prescriptions Disp Refills    lisinopril (PRINIVIL,ZESTRIL) 40 MG tablet 90 tablet 1     Sig: Take 1 tablet by mouth Daily.    amLODIPine (NORVASC) 10 MG tablet 90 tablet 1     Sig: Take 1 tablet by mouth Daily.       Routine medications provided by this office will also be refilled via pharmacy request.       Current Outpatient Medications:     amLODIPine (NORVASC) 10 MG tablet, Take 1 tablet by mouth Daily., Disp: 90 tablet, Rfl: 1    aspirin 81 MG EC tablet, Take 1 tablet by mouth Daily., Disp: , Rfl:     B Complex Vitamins (B COMPLEX 1 PO), Take 1 tablet by mouth. TAKES EVERY OTHER DAY, Disp: , Rfl:     ferrous sulfate 324 (65 Fe) MG tablet delayed-release EC tablet, Take 1 tablet by mouth., Disp: , Rfl:     Fexofenadine HCl (ALLERGY 24-HR PO), Take  by mouth., Disp: , Rfl:     lisinopril (PRINIVIL,ZESTRIL) 40 MG tablet, Take 1 tablet by mouth Daily., Disp: 90 tablet, Rfl: 1     Assessment and Plan:      Assessment and Plan {CC Problem List  Visit Diagnosis  ROS  Review (Popup)  [facility] Maintenance  Quality  BestPractice  Medications  SmartSets  SnapShot Encounters  Media :23}     Diagnoses and all orders for this visit:    1. Encounter to establish care (Primary)    2. Essential hypertension  Assessment & Plan:  Hypertension is stable and controlled  Continue current treatment regimen.  Dietary sodium restriction.  Stop smoking.  Blood pressure will be reassessed in 6 months.    Continue lisinopril, norvasc     Orders:  -     lisinopril (PRINIVIL,ZESTRIL) 40 MG tablet; Take 1 tablet by mouth Daily.  Dispense: 90 tablet; Refill: 1  -     amLODIPine (NORVASC) 10 MG tablet; Take 1 tablet by mouth Daily.  Dispense: 90 tablet; Refill: 1    3. Atherosclerosis of native coronary artery of native heart without angina pectoris  Assessment & " "Plan:  No complaint of CP  States uses treadmill at home.   He is on ASA.   Concern for statin is alcohol abuse.   Discussed with patient and will not take statin.       4. Panlobular emphysema  Assessment & Plan:  He has not been on inhaler.   States he'd rather not start.   Aware of risks of long term smoking - states \"hasn't bothered me before\"         5. Corns and callosity  -     Ambulatory Referral to Podiatry    6. Uncomplicated alcohol dependence    7. Spinal stenosis of lumbar region with neurogenic claudication    8. Cigarette nicotine dependence without complication  Overview:  2 1/2 PPD     Declines cessation: aware of risks                New Medications Ordered This Visit   Medications    lisinopril (PRINIVIL,ZESTRIL) 40 MG tablet     Sig: Take 1 tablet by mouth Daily.     Dispense:  90 tablet     Refill:  1    amLODIPine (NORVASC) 10 MG tablet     Sig: Take 1 tablet by mouth Daily.     Dispense:  90 tablet     Refill:  1       Additionally, I am providing longitudinal care which includes continuously being a focal point for all of the patient's health care services and ongoing medical care related to hypertension and coronary heart disease as documented above.        Follow Up {Instructions Charge Capture  Follow-up Communications :23}     Return in about 6 months (around 4/11/2025) for Medicare Wellness.      Patient was given instructions and counseling regarding his condition or for health maintenance advice. Please see specific information pulled into the AVS if appropriate.    Dragon disclaimer:   Much of this encounter note is an electronic transcription/translation of spoken language to printed text. The electronic translation of spoken language may permit erroneous, or at times, nonsensical words or phrases to be inadvertently transcribed; Although I have reviewed the note for such errors, some may still exist.     Additional Patient Counseling:       There are no Patient Instructions on " file for this visit.

## 2024-10-14 ENCOUNTER — TELEPHONE (OUTPATIENT)
Dept: INTERNAL MEDICINE | Facility: CLINIC | Age: 74
End: 2024-10-14
Payer: MEDICARE

## 2024-10-14 DIAGNOSIS — I25.10 ATHEROSCLEROSIS OF NATIVE CORONARY ARTERY OF NATIVE HEART WITHOUT ANGINA PECTORIS: Primary | ICD-10-CM

## 2024-10-14 NOTE — TELEPHONE ENCOUNTER
Caller: Jose Villafuerte    Relationship: Self    Best call back number: [phone]     What is the medical concern/diagnosis: BLOCKAGE IN [place]ORTA    What specialty or service is being requested: CARDIOLOGY    What is the provider, practice or medical service name: [facility]    What is the office location: [facility]    Any additional details: PATIENT STATES THAT CAMILO LAM TOLD HIM THAT HE HAS A BLOCKAGE AND IS WANTING TO KNOW IF HE NEEDS TO SEE A CARDIOLOGIST AND IF SO IF HE CAN GET A REFERRAL TO DO THAT.     PLEASE CALL PATIENT TO DISCUSS AND ADVISE.

## 2024-10-14 NOTE — TELEPHONE ENCOUNTER
Spoke to patient advised we can put referral in to cardiology.     Would like to see , Dr.Lash Raya.     Please advise.

## 2024-10-23 ENCOUNTER — OFFICE VISIT (OUTPATIENT)
Dept: CARDIOLOGY | Facility: CLINIC | Age: 74
End: 2024-10-23
Payer: MEDICARE

## 2024-10-23 VITALS
WEIGHT: 127 LBS | DIASTOLIC BLOOD PRESSURE: 70 MMHG | HEIGHT: 67 IN | SYSTOLIC BLOOD PRESSURE: 140 MMHG | HEART RATE: 70 BPM | BODY MASS INDEX: 19.93 KG/M2 | OXYGEN SATURATION: 96 %

## 2024-10-23 DIAGNOSIS — E78.00 HYPERCHOLESTEROLEMIA: ICD-10-CM

## 2024-10-23 DIAGNOSIS — R06.02 EXERTIONAL SHORTNESS OF BREATH: ICD-10-CM

## 2024-10-23 DIAGNOSIS — I25.10 CORONARY ARTERY CALCIFICATION SEEN ON CAT SCAN: ICD-10-CM

## 2024-10-23 DIAGNOSIS — J43.8 OTHER EMPHYSEMA: Primary | ICD-10-CM

## 2024-10-23 DIAGNOSIS — I10 ESSENTIAL HYPERTENSION: Chronic | ICD-10-CM

## 2024-10-23 PROCEDURE — 3077F SYST BP >= 140 MM HG: CPT | Performed by: INTERNAL MEDICINE

## 2024-10-23 PROCEDURE — 99204 OFFICE O/P NEW MOD 45 MIN: CPT | Performed by: INTERNAL MEDICINE

## 2024-10-23 PROCEDURE — 3078F DIAST BP <80 MM HG: CPT | Performed by: INTERNAL MEDICINE

## 2024-10-23 RX ORDER — PREDNISONE 20 MG/1
20 TABLET ORAL DAILY
Qty: 7 TABLET | Refills: 0 | Status: SHIPPED | OUTPATIENT
Start: 2024-10-23

## 2024-10-23 RX ORDER — TIOTROPIUM BROMIDE 18 UG/1
1 CAPSULE ORAL; RESPIRATORY (INHALATION)
Qty: 30 CAPSULE | Refills: 3 | Status: SHIPPED | OUTPATIENT
Start: 2024-10-23

## 2024-10-23 RX ORDER — ASPIRIN 81 MG/1
81 TABLET ORAL DAILY
Qty: 90 TABLET | Refills: 3 | Status: SHIPPED | OUTPATIENT
Start: 2024-10-23

## 2024-10-23 RX ORDER — ATORVASTATIN CALCIUM 20 MG/1
20 TABLET, FILM COATED ORAL DAILY
Qty: 90 TABLET | Refills: 3 | Status: SHIPPED | OUTPATIENT
Start: 2024-10-23

## 2024-10-23 RX ORDER — ALBUTEROL SULFATE 90 UG/1
2 INHALANT RESPIRATORY (INHALATION) EVERY 4 HOURS PRN
Qty: 8 G | Refills: 3 | Status: SHIPPED | OUTPATIENT
Start: 2024-10-23
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
## 2024-11-01 NOTE — OP NOTE
Problem: Skin Integrity Alteration  Goal: Skin remains intact with no new/deterioration of wound or pressure injury  Outcome: Monitoring/Evaluating progress     Problem: Skin Integrity Alteration  Goal: Wound care provided to promote comfort needs (Hospice)  Outcome: Monitoring/Evaluating progress      Lumbar Transforaminal Epidural Steroid Injection (two levels)  [facility]      PREOPERATIVE DIAGNOSIS:  left Lumbar Radiculopathy    POSTOPERATIVE DIAGNOSIS:  Same as preop diagnosis    PROCEDURE:    1. CPT 14671 --  Diagnostic Transforaminal Epidural Steroid Injection at the L2 level, on the left   2. CPT 47398 --  Diagnostic Transforaminal Epidural Steroid Injection at the L5 level, on the left     PRE-PROCEDURE DISCUSSION WITH PATIENT:    Risks and complications were discussed with the patient prior to starting the procedure and informed consent was obtained.  We discussed various topics including but not limited to bleeding, infection, injury, nerve injury, paralysis, coma, death, postprocedural painful flare-up, postprocedural site soreness, and a lack of pain relief.  We discussed the diagnostic aspect of transforaminal epidural / selective nerve root blockade.    SURGEON:  Steven Corcoran MD    REASON FOR PROCEDURE:    Degenerative changes are noted in the area. and Radiating pattern of pain is likely consistent with degenerative changes in the area.    SEDATION:  Versed 4mg & Fentanyl 50 mcg IV  ANESTHETIC:  Marcaine 0.25%  STEROID:  Methylprednisolone (DEPO MEDROL) 80mg/ml    DESCRIPTON OF PROCEDURE:  After obtaining informed consent, an I.V. was started in the preoperative area. The patient taken to the operating room and was placed in the prone position with a pillow under the abdomen.  All pressure points were well padded.  EKG, blood pressure, and pulse oximeter were monitored.  The lumbar area was prepped with Chloraprep and draped in a sterile fashion. Under fluoroscopic guidance in an oblique dimension on the above mentioned side, the transverse process of the first aforementioned vertebra at the junction of the body at 6 o'clock position was identified. Skin and subcutaneous tissue was anesthetized with 1% lidocaine. A 22-gauge spinal needle was introduced under fluoroscopic  guidance at the above junction into the foramen without parasthesias and into the epidural space. After confirming the position of the needle with PA, lateral, and oblique fluoroscopic views, aspiration was checked and was clear of blood or CSF.  Next, 1 mL of Omnipaque was injected. After seeing adequate spread on the corresponding nerve root, a total volume 2mL of injectate containing local anesthetic as above and half of the above mentioned corticosteroid was injected into the epidural space.  The needle was removed intact.      Next, in similar fashion, the second level mentioned above was addressed and a similar amount of injectate was delivered after similar imaging was achieved.      Vital signs were stable throughout.          ESTIMATED BLOOD LOSS:  <5 mL  SPECIMENS:  none    COMPLICATIONS:   No complications were noted., There was no indication of vascular uptake on live injection of contrast dye., There was no indication of intrathecal uptake on live injection of contrast dye., There was not any evidence of dural puncture.   and The patient did not have any signs of postprocedure numbness nor weakness.    TOLERANCE & DISCHARGE CONDITION:    The patient tolerated the procedure well.  The patient was transported to the recovery area without difficulties.  The patient was discharged to home under the care of family in stable and satisfactory condition.    PLAN OF CARE:  1. The patient was given our standard instruction sheet.  2. The patient will Return to clinic 3-4 wks.  3. The patient will resume all medications as per the medication reconciliation sheet.

## 2024-11-08 ENCOUNTER — TELEPHONE (OUTPATIENT)
Dept: CARDIOLOGY | Facility: CLINIC | Age: 74
End: 2024-11-08
Payer: MEDICARE

## 2024-11-11 ENCOUNTER — HOSPITAL ENCOUNTER (OUTPATIENT)
Dept: CARDIOLOGY | Facility: HOSPITAL | Age: 74
Discharge: HOME | End: 2024-11-11
Payer: MEDICARE

## 2024-11-11 ENCOUNTER — TELEPHONE (OUTPATIENT)
Dept: CARDIOLOGY | Facility: CLINIC | Age: 74
End: 2024-11-11
Payer: MEDICARE

## 2024-11-11 VITALS — WEIGHT: 127.87 LBS | HEIGHT: 67 IN | BODY MASS INDEX: 20.07 KG/M2

## 2024-11-11 DIAGNOSIS — R06.02 EXERTIONAL SHORTNESS OF BREATH: ICD-10-CM

## 2024-11-11 LAB
BH CV NUCLEAR PRIOR STUDY: 2
BH CV REST NUCLEAR ISOTOPE DOSE: 10.1 MCI
BH CV STRESS BP STAGE 1: NORMAL
BH CV STRESS COMMENTS STAGE 1: NORMAL
BH CV STRESS DOSE REGADENOSON STAGE 1: 0.4
BH CV STRESS DURATION MIN STAGE 1: 0
BH CV STRESS DURATION SEC STAGE 1: 10
BH CV STRESS HR STAGE 1: 112
BH CV STRESS NUCLEAR ISOTOPE DOSE: 34 MCI
BH CV STRESS PROTOCOL 1: NORMAL
BH CV STRESS RECOVERY BP: NORMAL MMHG
BH CV STRESS RECOVERY HR: 98 BPM
BH CV STRESS STAGE 1: 1
LV EF NUC BP: 62 %
MAXIMAL PREDICTED HEART RATE: 146 BPM
PERCENT MAX PREDICTED HR: 76.71 %
STRESS BASELINE BP: NORMAL MMHG
STRESS BASELINE HR: 87 BPM
STRESS PERCENT HR: 90 %
STRESS POST EXERCISE DUR SEC: 10 SEC
STRESS POST PEAK BP: NORMAL MMHG
STRESS POST PEAK HR: 112 BPM
STRESS TARGET HR: 124 BPM

## 2024-11-11 PROCEDURE — 93018 CV STRESS TEST I&R ONLY: CPT | Performed by: INTERNAL MEDICINE

## 2024-11-11 PROCEDURE — 93017 CV STRESS TEST TRACING ONLY: CPT

## 2024-11-11 PROCEDURE — 25010000002 REGADENOSON 0.4 MG/5ML SOLUTION: Performed by: INTERNAL MEDICINE

## 2024-11-11 PROCEDURE — 78452 HT MUSCLE IMAGE SPECT MULT: CPT | Performed by: INTERNAL MEDICINE

## 2024-11-11 PROCEDURE — 0 TECHNETIUM SESTAMIBI: Performed by: INTERNAL MEDICINE

## 2024-11-11 PROCEDURE — A9500 TC99M SESTAMIBI: HCPCS | Performed by: INTERNAL MEDICINE

## 2024-11-11 PROCEDURE — 93016 CV STRESS TEST SUPVJ ONLY: CPT | Performed by: INTERNAL MEDICINE

## 2024-11-11 PROCEDURE — 78452 HT MUSCLE IMAGE SPECT MULT: CPT

## 2024-11-11 RX ORDER — REGADENOSON 0.08 MG/ML
0.4 INJECTION, SOLUTION INTRAVENOUS
Status: COMPLETED | OUTPATIENT
Start: 2024-11-11 | End: 2024-11-11

## 2024-11-11 RX ADMIN — REGADENOSON 0.4 MG: 0.08 INJECTION, SOLUTION INTRAVENOUS at 14:25

## 2024-11-11 RX ADMIN — TECHNETIUM TC 99M SESTAMIBI 1 DOSE: 1 INJECTION INTRAVENOUS at 13:22

## 2024-11-11 RX ADMIN — TECHNETIUM TC 99M SESTAMIBI 1 DOSE: 1 INJECTION INTRAVENOUS at 14:25

## 2024-11-11 NOTE — TELEPHONE ENCOUNTER
----- Message from Joseline Blakely sent at 11/11/2024  3:59 PM EST -----  Let patient know stress test is normal.  ----- Message -----  From: Carlos Plascencia MD  Sent: 11/11/2024   3:47 PM EST  To: Stephan Madrigal MD

## 2024-11-11 NOTE — TELEPHONE ENCOUNTER
Called and left VM. Will continue to try to reach patient. HUB transfer call to triage.     Eunice Jama RN  Triage [facility]

## 2024-11-12 NOTE — TELEPHONE ENCOUNTER
Notified patient of results. Patient verbalized understanding. Patient is wanting to know when he needs to come back and see us? LOV didn't specify.     Eunice Jama RN  Triage [facility]

## 2024-11-12 NOTE — TELEPHONE ENCOUNTER
Notified patient of recommendations. Patient verbalized understanding. He will call back to schedule.     Eunice Jama RN  Triage [facility] [place]

## 2024-11-25 ENCOUNTER — OFFICE VISIT (OUTPATIENT)
Dept: PAIN MEDICINE | Facility: CLINIC | Age: 74
End: 2024-11-25
Payer: MEDICARE

## 2024-11-25 VITALS
TEMPERATURE: 97.8 F | WEIGHT: 130.2 LBS | SYSTOLIC BLOOD PRESSURE: 131 MMHG | HEIGHT: 67 IN | BODY MASS INDEX: 20.44 KG/M2 | HEART RATE: 83 BPM | OXYGEN SATURATION: 92 % | DIASTOLIC BLOOD PRESSURE: 78 MMHG

## 2024-11-25 DIAGNOSIS — M48.062 SPINAL STENOSIS OF LUMBAR REGION WITH NEUROGENIC CLAUDICATION: ICD-10-CM

## 2024-11-25 DIAGNOSIS — M54.16 LUMBAR RADICULOPATHY: Primary | ICD-10-CM

## 2024-11-25 PROCEDURE — 1159F MED LIST DOCD IN RCRD: CPT | Performed by: PHYSICIAN ASSISTANT

## 2024-11-25 PROCEDURE — 3075F SYST BP GE 130 - 139MM HG: CPT | Performed by: PHYSICIAN ASSISTANT

## 2024-11-25 PROCEDURE — 1125F AMNT PAIN NOTED PAIN PRSNT: CPT | Performed by: PHYSICIAN ASSISTANT

## 2024-11-25 PROCEDURE — 1160F RVW MEDS BY RX/DR IN RCRD: CPT | Performed by: PHYSICIAN ASSISTANT

## 2024-11-25 PROCEDURE — 99212 OFFICE O/P EST SF 10 MIN: CPT | Performed by: PHYSICIAN ASSISTANT

## 2024-11-25 PROCEDURE — 3078F DIAST BP <80 MM HG: CPT | Performed by: PHYSICIAN ASSISTANT

## 2024-11-25 NOTE — PROGRESS NOTES
CHIEF COMPLAINT  F/U back pain-     Subjective   Jose Villafuerte is a 74 y.o. male  who presents for follow-up.  He has a history of chronic low back and left lower extremity pain.  Jose is pleased to report that he obtained over 50% pain relief x 3 months following his last lumbar TFESI along with obtaining improvement of range of motion and physical activity.  Over the last week or so he has noted significant worsening of primarily left-sided lumbar spine pain radiating to the posterior buttock/hip and into the posterior aspect of the thigh usually terminating at the knee.  Patient finds that his pain is aggravated with prolonged standing or standing.  It is alleviated with therapeutic lumbar TFESI's, rest and repositioning.    Patient has been evaluated by Dr. Mackey in the past who recommended a left L5-S1 laminectomy, foraminotomies, and medial facetectomy with [facility]. Dr. Mackey declined moving forward with operative intervention secondary to patient's history of alcohol abuse.  Dr. Corcoran notes patient is not a good candidate for dorsal column stimulation. Patient wishes to avoid opioid medication and notes that epidurals manage his pain well. Epidural injections every 3-4 months offer significant pain relief and the patient to be more active.     Continues to take Aspirin 81mg for aortic atherosclerosis.      Procedures:  8/21/24 - Left S1 TF LESI - 50% ongoing relief   5/7/24 - Left L5 & S1 TF LESI - 70% relief x 1 month, 50% for almost 2 months  10/3/23 - Left L5 & S1 TF LESI - 40% relief x 3 months  7/6/23 - Left L5 & S1 TF TOM - 75% relief x 3 months  12/13/23 - Left SI joint and left piriformis injection - 50% relief x 6 weeks   9/7/22 - Left Piriformis injection - 50% relief x 4 weeks  5/25/22 - Left L2 & L5 TF TOM     Pain today 7/10 VAS in severity.    History of Present Illness     PEG Assessment   What number best describes your pain on average in the past week?7  What number best describes  "how, during the past week, pain has interfered with your enjoyment of life?0  What number best describes how, during the past week, pain has interfered with your general activity?  0    Review of Pertinent Medical Data ---  No new imaging for review on today    The following portions of the patient's history were reviewed and updated as appropriate: allergies, current medications, past family history, past medical history, past social history, past surgical history, and problem list.    Review of Systems   Constitutional:  Negative for activity change, chills, fatigue and fever.   HENT:  Positive for congestion.    Eyes:  Negative for visual disturbance.   Respiratory:  Positive for cough and shortness of breath. Negative for chest tightness.    Cardiovascular:  Negative for chest pain.   Gastrointestinal:  Negative for abdominal pain, constipation and diarrhea.   Genitourinary:  Negative for difficulty urinating and dysuria.   Musculoskeletal:  Positive for back pain.   Neurological:  Positive for dizziness and light-headedness. Negative for weakness, numbness and headaches.   Psychiatric/Behavioral:  Negative for agitation, self-injury, sleep disturbance and suicidal ideas. The patient is not nervous/anxious.      I have reviewed and confirmed the accuracy of the ROS as documented by the MA/LPN/RN SUKHI Stepehns  Vitals:    11/25/24 1036   BP: 131/78   Pulse: 83   Temp: 97.8 °F (36.6 °C)   SpO2: 92%   Weight: 59.1 kg (130 lb 3.2 oz)   Height: 170.2 cm (67\")   PainSc:   7   PainLoc: Back         Objective   Physical Exam  Vitals and nursing note reviewed.   Constitutional:       Appearance: Normal appearance. He is normal weight.   HENT:      Head: Normocephalic.   Cardiovascular:      Pulses:           Dorsalis pedis pulses are 1+ on the right side and 1+ on the left side.   Pulmonary:      Effort: Pulmonary effort is normal.   Musculoskeletal:      Lumbar back: Tenderness present. Decreased range of motion. " Positive left straight leg raise test.        Back:    Skin:     General: Skin is warm and dry.   Neurological:      General: No focal deficit present.      Mental Status: He is alert and oriented to person, place, and time.      Cranial Nerves: Cranial nerves 2-12 are intact.      Sensory: Sensation is intact.      Motor: Motor function is intact.      Gait: Gait is intact.   Psychiatric:         Mood and Affect: Mood normal.         Behavior: Behavior normal.         Thought Content: Thought content normal.         Judgment: Judgment normal.         Assessment & Plan   Diagnoses and all orders for this visit:    1. Lumbar radiculopathy (Primary)    2. Spinal stenosis of lumbar region with neurogenic claudication        Jose Villafuerte reports a pain score of 7.  Given his pain assessment as noted, treatment options were discussed and the following options were decided upon as a follow-up plan to address the patient's pain: continuation of current treatment plan for pain, steroid injections, and use of non-medical modalities (ice, heat, stretching and/or behavior modifications).      --- Due to recrudescence of left sided low back pain radiating to the left lower extremity I do recommend him returning for therapeutic left S1 lumbar TFESI under fluoroscopy guidance with Dr. Corcoran.  Risk and benefits of the procedure have been discussed with the patient and questions have been addressed.  --- We will plan for follow-up approximately 4-6 weeks after undergoing injective therapy.        ---  Indications for epidural injection:  Plan is to proceed with epidural at the appropriate level.  If the patient receives significant pain reduction and improvement in function and the plan will be to repeat the epidural when the pain worsens.  If a second epidural provides at least 6 weeks of sustained improvement that includes both pain reduction and improvement in function then an epidural injection could be repeated once  again at the same level.  This is a mutual decision between the clinician and the patient that includes discussions including risks and benefits in detail as well as alternative therapies.  Patient's questions were answered to their satisfaction and to their understanding.  ---                Dictated utilizing Dragon dictation.

## 2024-11-25 NOTE — H&P (VIEW-ONLY)
CHIEF COMPLAINT  F/U back pain-     Subjective   Jose Villafuerte is a 74 y.o. male  who presents for follow-up.  He has a history of chronic low back and left lower extremity pain.  Jose is pleased to report that he obtained over 50% pain relief x 3 months following his last lumbar TFESI along with obtaining improvement of range of motion and physical activity.  Over the last week or so he has noted significant worsening of primarily left-sided lumbar spine pain radiating to the posterior buttock/hip and into the posterior aspect of the thigh usually terminating at the knee.  Patient finds that his pain is aggravated with prolonged standing or standing.  It is alleviated with therapeutic lumbar TFESI's, rest and repositioning.    Patient has been evaluated by Dr. Mackey in the past who recommended a left L5-S1 laminectomy, foraminotomies, and medial facetectomy with Metrix. Dr. Mackey declined moving forward with operative intervention secondary to patient's history of alcohol abuse.  Dr. Corcoran notes patient is not a good candidate for dorsal column stimulation. Patient wishes to avoid opioid medication and notes that epidurals manage his pain well. Epidural injections every 3-4 months offer significant pain relief and the patient to be more active.     Continues to take Aspirin 81mg for aortic atherosclerosis.      Procedures:  8/21/24 - Left S1 TF LESI - 50% ongoing relief   5/7/24 - Left L5 & S1 TF LESI - 70% relief x 1 month, 50% for almost 2 months  10/3/23 - Left L5 & S1 TF LESI - 40% relief x 3 months  7/6/23 - Left L5 & S1 TF TOM - 75% relief x 3 months  12/13/23 - Left SI joint and left piriformis injection - 50% relief x 6 weeks   9/7/22 - Left Piriformis injection - 50% relief x 4 weeks  5/25/22 - Left L2 & L5 TF TOM     Pain today 7/10 VAS in severity.    History of Present Illness     PEG Assessment   What number best describes your pain on average in the past week?7  What number best describes  "how, during the past week, pain has interfered with your enjoyment of life?0  What number best describes how, during the past week, pain has interfered with your general activity?  0    Review of Pertinent Medical Data ---  No new imaging for review on today    The following portions of the patient's history were reviewed and updated as appropriate: allergies, current medications, past family history, past medical history, past social history, past surgical history, and problem list.    Review of Systems   Constitutional:  Negative for activity change, chills, fatigue and fever.   HENT:  Positive for congestion.    Eyes:  Negative for visual disturbance.   Respiratory:  Positive for cough and shortness of breath. Negative for chest tightness.    Cardiovascular:  Negative for chest pain.   Gastrointestinal:  Negative for abdominal pain, constipation and diarrhea.   Genitourinary:  Negative for difficulty urinating and dysuria.   Musculoskeletal:  Positive for back pain.   Neurological:  Positive for dizziness and light-headedness. Negative for weakness, numbness and headaches.   Psychiatric/Behavioral:  Negative for agitation, self-injury, sleep disturbance and suicidal ideas. The patient is not nervous/anxious.      I have reviewed and confirmed the accuracy of the ROS as documented by the MA/LPN/RN SUKHI Stephens  Vitals:    11/25/24 1036   BP: 131/78   Pulse: 83   Temp: 97.8 °F (36.6 °C)   SpO2: 92%   Weight: 59.1 kg (130 lb 3.2 oz)   Height: 170.2 cm (67\")   PainSc:   7   PainLoc: Back         Objective   Physical Exam  Vitals and nursing note reviewed.   Constitutional:       Appearance: Normal appearance. He is normal weight.   HENT:      Head: Normocephalic.   Cardiovascular:      Pulses:           Dorsalis pedis pulses are 1+ on the right side and 1+ on the left side.   Pulmonary:      Effort: Pulmonary effort is normal.   Musculoskeletal:      Lumbar back: Tenderness present. Decreased range of motion. " Positive left straight leg raise test.        Back:    Skin:     General: Skin is warm and dry.   Neurological:      General: No focal deficit present.      Mental Status: He is alert and oriented to person, place, and time.      Cranial Nerves: Cranial nerves 2-12 are intact.      Sensory: Sensation is intact.      Motor: Motor function is intact.      Gait: Gait is intact.   Psychiatric:         Mood and Affect: Mood normal.         Behavior: Behavior normal.         Thought Content: Thought content normal.         Judgment: Judgment normal.         Assessment & Plan   Diagnoses and all orders for this visit:    1. Lumbar radiculopathy (Primary)    2. Spinal stenosis of lumbar region with neurogenic claudication        Jose Villafuerte reports a pain score of 7.  Given his pain assessment as noted, treatment options were discussed and the following options were decided upon as a follow-up plan to address the patient's pain: continuation of current treatment plan for pain, steroid injections, and use of non-medical modalities (ice, heat, stretching and/or behavior modifications).      --- Due to recrudescence of left sided low back pain radiating to the left lower extremity I do recommend him returning for therapeutic left S1 lumbar TFESI under fluoroscopy guidance with Dr. Corcoran.  Risk and benefits of the procedure have been discussed with the patient and questions have been addressed.  --- We will plan for follow-up approximately 4-6 weeks after undergoing injective therapy.        ---  Indications for epidural injection:  Plan is to proceed with epidural at the appropriate level.  If the patient receives significant pain reduction and improvement in function and the plan will be to repeat the epidural when the pain worsens.  If a second epidural provides at least 6 weeks of sustained improvement that includes both pain reduction and improvement in function then an epidural injection could be repeated once  again at the same level.  This is a mutual decision between the clinician and the patient that includes discussions including risks and benefits in detail as well as alternative therapies.  Patient's questions were answered to their satisfaction and to their understanding.  ---                Dictated utilizing Dragon dictation.

## 2024-12-04 ENCOUNTER — TRANSCRIBE ORDERS (OUTPATIENT)
Dept: SURGERY | Facility: SURGERY CENTER | Age: 74
End: 2024-12-04
Payer: MEDICARE

## 2024-12-04 ENCOUNTER — TELEPHONE (OUTPATIENT)
Dept: PAIN MEDICINE | Facility: CLINIC | Age: 74
End: 2024-12-04

## 2024-12-04 DIAGNOSIS — Z41.9 SURGERY, ELECTIVE: Primary | ICD-10-CM

## 2024-12-04 NOTE — TELEPHONE ENCOUNTER
Caller: Jose Villafuerte    Relationship to patient: Self    Best call back number:     Type of visit: EPIDURAL    Requested date: AROUND THE SAME DATE 12/18/24 BUT LATER IN THE DAY AROUND 11:20    If rescheduling, when is the original appointment: 12/18/24 @ 10;20

## 2024-12-13 ENCOUNTER — HOSPITAL ENCOUNTER (OUTPATIENT)
Facility: SURGERY CENTER | Age: 74
Setting detail: HOSPITAL OUTPATIENT SURGERY
Discharge: HOME | End: 2024-12-13
Attending: ANESTHESIOLOGY | Admitting: ANESTHESIOLOGY
Payer: MEDICARE

## 2024-12-13 ENCOUNTER — HOSPITAL ENCOUNTER (OUTPATIENT)
Dept: GENERAL RADIOLOGY | Facility: SURGERY CENTER | Age: 74
Setting detail: HOSPITAL OUTPATIENT SURGERY
End: 2024-12-13
Payer: MEDICARE

## 2024-12-13 VITALS
DIASTOLIC BLOOD PRESSURE: 70 MMHG | RESPIRATION RATE: 16 BRPM | OXYGEN SATURATION: 95 % | HEART RATE: 71 BPM | TEMPERATURE: 97 F | SYSTOLIC BLOOD PRESSURE: 126 MMHG

## 2024-12-13 DIAGNOSIS — M54.42 LEFT-SIDED LOW BACK PAIN WITH LEFT-SIDED SCIATICA, UNSPECIFIED CHRONICITY: ICD-10-CM

## 2024-12-13 DIAGNOSIS — M48.062 SPINAL STENOSIS OF LUMBAR REGION WITH NEUROGENIC CLAUDICATION: ICD-10-CM

## 2024-12-13 DIAGNOSIS — M54.16 LUMBAR RADICULOPATHY: ICD-10-CM

## 2024-12-13 DIAGNOSIS — Z41.9 SURGERY, ELECTIVE: ICD-10-CM

## 2024-12-13 PROCEDURE — 25010000002 LIDOCAINE PF 1% 1 % SOLUTION 5 ML VIAL: Performed by: ANESTHESIOLOGY

## 2024-12-13 PROCEDURE — 64483 NJX AA&/STRD TFRM EPI L/S 1: CPT | Performed by: ANESTHESIOLOGY

## 2024-12-13 PROCEDURE — 25010000002 BUPIVACAINE (PF) 0.5 % SOLUTION 10 ML VIAL: Performed by: ANESTHESIOLOGY

## 2024-12-13 PROCEDURE — 77002 NEEDLE LOCALIZATION BY XRAY: CPT

## 2024-12-13 PROCEDURE — 76000 FLUOROSCOPY <1 HR PHYS/QHP: CPT

## 2024-12-13 PROCEDURE — 25510000001 IOPAMIDOL 61 % SOLUTION 30 ML VIAL: Performed by: ANESTHESIOLOGY

## 2024-12-13 PROCEDURE — 64484 NJX AA&/STRD TFRM EPI L/S EA: CPT | Performed by: ANESTHESIOLOGY

## 2024-12-13 PROCEDURE — 25010000002 METHYLPREDNISOLONE PER 80 MG: Performed by: ANESTHESIOLOGY

## 2024-12-13 RX ORDER — DIAZEPAM 5 MG/1
10 TABLET ORAL ONCE
Status: COMPLETED | OUTPATIENT
Start: 2024-12-13 | End: 2024-12-13

## 2024-12-13 RX ADMIN — DIAZEPAM 10 MG: 5 TABLET ORAL at 14:13

## 2024-12-13 NOTE — OP NOTE
LTFESI with S1 TFESI  [facility]    PREOPERATIVE DIAGNOSIS:   Lumbar Spinal Stenosis WITH Neurogenic Claudication and left Lumbar Radiculopathy    POSTOPERATIVE DIAGNOSIS: Same as preop dx    PROCEDURE:    97422 --  Diagnostic  Transforaminal Epidural Steroid Injection at the  Left  L5 Level  68115 -- S1 Transforaminal Epidural Steroid Injection on the Left    PRE-PROCEDURE DISCUSSION WITH PATIENT:    Risks and complications were discussed with the patient prior to starting the procedure and informed consent was obtained.    SURGEON:  Steven Corcoran MD    REASON FOR PROCEDURE:     Previous clinically significant therapeutic effect is noted., Degenerative changes are noted in the area., Stenotic area is noted, and is likely contributing to this chronic &/or recurrent pain. , and Radiating pattern of pain is likely consistent with degenerative changes in the area.    SEDATION:  Patient and significant procedural anxiety.  He has required oral diazepam in the past to be able to proceed.  He had 10 mg oral diazepam in the preoperative area  ANESTHETIC: Marcaine 0.25%  STEROID:     Methylprednisolone (DEPO MEDROL) 80mg/ml  TOTAL VOLUME OF SOLUTION:   4mL    DESCRIPTON OF PROCEDURE:  After obtaining informed consent, an I.V. was not started in the preoperative area. The patient taken to the operating room and was placed in the prone position with a pillow under the abdomen.  All pressure points were well padded.  EKG, blood pressure, and pulse oximeter were monitored.  The lumbosacral area was prepped with Chloraprep and draped in a sterile fashion. Under fluoroscopic guidance in an oblique dimension, the transverse process of the above mentioned Lumbar vertebra at the junction of the body at 6 o'clock position respective to the pedicle on the aforementioned side was identified. Skin and subcutaneous tissue was anesthetized with 2-3mL of 1% lidocaine. A 22-gauge spinal needle was introduced under  fluoroscopic guidance at the above junction into the foramen without parasthesias and into the epidural space. After confirming the position of the needle with PA, lateral, and oblique fluoroscopic views, aspiration was checked and was clear of blood or CSF.  Next, 1 mL of contrast dye was injected. After seeing adequate spread on the corresponding nerve root, a total volume 2 mL of injectate containing half of the previously mentioned local anesthetic solution was slowly and easily injected into the space.    The needle was removed intact.  Vital signs were stable.      Next, the S1 posterior foramen was identified on the above mentioned side with fluoroscopy in a PA dimension, and a spinal needle was introduced to just caudad to the 6 o’clock position of the foramen until contact with os; then the needle was walked off cephalad and into the foramen.   After confirming the position of the needle with PA and lateral fluoroscopic views, aspiration was checked and was clear of blood or CSF.  Next, 1 mL of contrast dye was injected. After seeing adequate spread on the S1 nerve root and into the caudal epidural space, a total volume 2mL of injectate containing the other half of the local anesthetic solution was easily and slowly injected into the epidural space.   The needle was removed intact.  Vital signs were stable.  Onset of analgesia was noted prior to transport to the recovery area.      ESTIMATED BLOOD LOSS:  <5 mL  SPECIMENS:  none    COMPLICATIONS:   No complications were noted., There was no indication of vascular uptake on live injection of contrast dye., There was no indication of intrathecal uptake on live injection of contrast dye., There was not any evidence of dural puncture.  , and The patient did not have any signs of postprocedure numbness nor weakness.    TOLERANCE & DISCHARGE CONDITION:    The patient tolerated the procedure well.  The patient was transported to the recovery area without  difficulties.  The patient was discharged to home under the care of family in stable and satisfactory condition.    PLAN OF CARE:  The patient was given our standard instruction sheet.  The patient will Return to clinic 4-6 wks  The patient will resume all medications as per the medication reconciliation sheet.

## 2024-12-13 NOTE — DISCHARGE INSTRUCTIONS
[facility] – [place] [place] [facility] - Post-procedure Instructions          --  While there are no absolute restrictions, it is recommended that you do not perform strenuous activity today. In the morning, you may resume your level of activity as before your block.    --  If you have a band-aid at your injection site, please remove it later today. Observe the area for any redness, swelling, pus-like drainage, or a temperature over 101°. If any of these symptoms occur, please call your doctor at [phone]. If after office hours, leave a message and the on-call provider will return your call.    --  Ice may be applied to your injection site. It is recommended you avoid direct heat (heating pad; hot tub) for 1-2 days.    --  Call [facility] [place], [place]-[facility] at [phone] if you experience persistent headache, persistent bleeding from the injection site, or severe pain not relieved by heat or oral medication.    --  Do not make important decisions today.    --  Due to the effects of the block and/or the I.V. Sedation, DO NOT drive or operate hazardous machinery for 12 hours.  Local anesthetics may cause numbness after procedure and precautions must be taken with regards to operating equipment as well as with walking, even if ambulating with assistance of another person or with an assistive device.    --  Do not drink alcohol for 12 hours.    -- You may return to work tomorrow, or as directed by your referring doctor.    --  Occasionally you may notice a slight increase in your pain after the procedure. This should start to improve within the next 24-48 hours. Radiofrequency ablation procedure pain may last 3-4 weeks.    --  It may take as long as 3-4 days before you notice a gradual improvement in your pain and/or other symptoms.    -- You may continue to take your prescribed pain medication as needed.    --  Some normal possible side effects of steroid use could include fluid retention, increased blood sugar, dull headache,  increased sweating, increased appetite, mood swings and flushing.    --  Diabetics are recommended to watch their blood glucose level closely for 24-48 hours after the injection.    --  Must stay in PACU for 20 min upon arrival and prove no leg weakness before being discharged.    --  IN THE EVENT OF A LIFE THREATENING EMERGENCY, (CHEST PAIN, BREATHING DIFFICULTIES, PARALYSIS…) YOU SHOULD GO TO YOUR NEAREST EMERGENCY ROOM.    --  You should be contacted by our office within 2-3 days to schedule follow up or next appointment date.  If not contacted within 7 days, please call the office at [phone]

## 2024-12-30 NOTE — ASSESSMENT & PLAN NOTE
"He has not been on inhaler.   States he'd rather not start.   Aware of risks of long term smoking - states \"hasn't bothered me before\"     " Rx Refill Request     Name: Modesto Callejas  :  1980   Medication Name:  busPIRone (Buspar) 15 mg tablet   Specific Pharmacy location:  [facility] [address] [place] [address]   Date of last appointment:  2024  Date of next appointment:  Visit date not found   Best number to reach patient:  [phone]

## 2025-01-23 ENCOUNTER — OFFICE VISIT (OUTPATIENT)
Dept: PAIN MEDICINE | Facility: CLINIC | Age: 75
End: 2025-01-23
Payer: MEDICARE

## 2025-01-23 VITALS
HEIGHT: 67 IN | OXYGEN SATURATION: 98 % | TEMPERATURE: 97.6 F | SYSTOLIC BLOOD PRESSURE: 136 MMHG | BODY MASS INDEX: 19.62 KG/M2 | HEART RATE: 79 BPM | WEIGHT: 125 LBS | DIASTOLIC BLOOD PRESSURE: 74 MMHG | RESPIRATION RATE: 18 BRPM

## 2025-01-23 DIAGNOSIS — M54.42 LEFT-SIDED LOW BACK PAIN WITH LEFT-SIDED SCIATICA, UNSPECIFIED CHRONICITY: ICD-10-CM

## 2025-01-23 DIAGNOSIS — M54.16 LUMBAR RADICULOPATHY: Primary | ICD-10-CM

## 2025-01-23 DIAGNOSIS — M48.062 SPINAL STENOSIS OF LUMBAR REGION WITH NEUROGENIC CLAUDICATION: ICD-10-CM

## 2025-01-23 PROCEDURE — 3075F SYST BP GE 130 - 139MM HG: CPT

## 2025-01-23 PROCEDURE — 1159F MED LIST DOCD IN RCRD: CPT

## 2025-01-23 PROCEDURE — 1160F RVW MEDS BY RX/DR IN RCRD: CPT

## 2025-01-23 PROCEDURE — 3078F DIAST BP <80 MM HG: CPT

## 2025-01-23 PROCEDURE — 99212 OFFICE O/P EST SF 10 MIN: CPT

## 2025-01-23 PROCEDURE — 1125F AMNT PAIN NOTED PAIN PRSNT: CPT

## 2025-01-23 NOTE — PROGRESS NOTES
CHIEF COMPLAINT  Back pain     Subjective   Jose Villafuerte is a 74 y.o. male  who presents to the office for follow-up of procedure.  He completed a Left L5 & S1 TF LESI on  12/13/2024 performed by Dr. Corcoran for management of back pain. Patient reports 50% ongoing relief from the procedure. He reports that his overall pain level has improved and that he has felt better since the procedure.     Today pain is 6/10VAS in severity. Left posterior leg pain has improved since his procedure but his continues to report left buttock pain. Pain is located in his left buttock and radiates into left hip and down left posterior leg. Denies radicular pain to right leg. Describes this pain as a nearly continuous aching and burning. Pain is worsened by prolonged standing or sitting. Pain improves with rest/reposition, therapeutic lumber TFLESI's, and use of an inversion table.      Patient has been evaluated by Dr. Mackey in the past who recommended a left L5-S1 laminectomy, foraminotomies, and medial facetectomy with Metrix. Dr. Mackey declined moving forward with operative intervention secondary to patient's history of alcohol abuse.  Dr. Corcoran notes patient is not a good candidate for dorsal column stimulation. Patient wishes to avoid opioid medication and notes that epidurals manage his pain well. Epidural injections every 3-4 months offer significant pain relief and the patient to be more active.      Continues to take Aspirin 81mg for aortic atherosclerosis.      Procedures:  12/13/24 - Left l5 & S1 TF LESI - 50% ongoing relief  8/21/24 - Left S1 TF LESI - 50% relief x 3.5 months  5/7/24 - Left L5 & S1 TF LESI - 70% relief x 1 month, 50% for almost 2 months  10/3/23 - Left L5 & S1 TF LESI - 40% relief x 3 months  7/6/23 - Left L5 & S1 TF TOM - 75% relief x 3 months  12/13/23 - Left SI joint and left piriformis injection - 50% relief x 6 weeks   9/7/22 - Left Piriformis injection - 50% relief x 4 weeks  5/25/22 - Left  L2 & L5 TF TOM     Back Pain  This is a chronic problem. The current episode started more than 1 year ago. The problem occurs constantly. The problem has been improved since onset. The pain is present in the lumbar spine, sacro-iliac and gluteal. The quality of the pain is described as aching and burning. The pain radiates to the left thigh (Radiates into left buttock and down left posterior leg stopping at the knee). The pain is at a severity of 6/10. The pain is moderate. The symptoms are aggravated by standing, sitting and position (prolonged position). Pertinent negatives include no abdominal pain, chest pain, dysuria, fever, headaches, numbness or weakness. He has tried bed rest, heat, ice, walking, NSAIDs and muscle relaxant for the symptoms.      PEG Assessment   What number best describes your pain on average in the past week?6  What number best describes how, during the past week, pain has interfered with your enjoyment of life?5  What number best describes how, during the past week, pain has interfered with your general activity?  5    Review of Pertinent Medical Data ---      The following portions of the patient's history were reviewed and updated as appropriate: allergies, current medications, past family history, past medical history, past social history, past surgical history, and problem list.    Review of Systems   Constitutional:  Negative for fever.   Cardiovascular:  Negative for chest pain.   Gastrointestinal:  Negative for abdominal pain, constipation and diarrhea.   Genitourinary:  Negative for difficulty urinating and dysuria.   Musculoskeletal:  Positive for back pain.   Neurological:  Negative for weakness, numbness and headaches.   Psychiatric/Behavioral:  Negative for sleep disturbance and suicidal ideas. The patient is not nervous/anxious.      I have reviewed and confirmed the accuracy of the ROS as documented by the MA/LPN/MANE Pisano, APRN     Vitals:    01/23/25 1029   BP:  "136/74   Pulse: 79   Resp: 18   Temp: 97.6 °F (36.4 °C)   SpO2: 98%   Weight: 56.7 kg (125 lb)   Height: 170.2 cm (67\")   PainSc:   6   PainLoc: Buttocks     Objective   Physical Exam  Constitutional:       Appearance: Normal appearance.   HENT:      Head: Normocephalic.   Cardiovascular:      Rate and Rhythm: Normal rate and regular rhythm.   Pulmonary:      Effort: Pulmonary effort is normal.      Breath sounds: Normal breath sounds.   Musculoskeletal:         General: Normal range of motion.      Cervical back: Normal range of motion.      Lumbar back: Spasms and tenderness present.   Skin:     General: Skin is warm and dry.      Capillary Refill: Capillary refill takes less than 2 seconds.   Neurological:      General: No focal deficit present.      Mental Status: He is alert and oriented to person, place, and time.      Gait: Gait abnormal (slowed).   Psychiatric:         Mood and Affect: Mood normal.         Behavior: Behavior normal.         Thought Content: Thought content normal.         Cognition and Memory: Cognition normal.       Assessment & Plan   Diagnoses and all orders for this visit:    1. Lumbar radiculopathy (Primary)    2. Spinal stenosis of lumbar region with neurogenic claudication    3. Left-sided low back pain with left-sided sciatica, unspecified chronicity      Jose Villafuerte reports a pain score of 6.  Given his pain assessment as noted, treatment options were discussed and the following options were decided upon as a follow-up plan to address the patient's pain: continuation of current treatment plan for pain.    --- Follow-up as needed for worsening pain and/or to repeat KAYLIN BRICE REPORT  As the clinician, I personally reviewed the YUNIEL from 1/23/25 while the patient was in the office today.    Dictated utilizing Dragon dictation.    "

## 2025-01-30 ENCOUNTER — TELEPHONE (OUTPATIENT)
Dept: INTERNAL MEDICINE | Facility: CLINIC | Age: 75
End: 2025-01-30

## 2025-01-30 NOTE — TELEPHONE ENCOUNTER
Advised pt to take tylenol cold and flu to treat his symptoms. Pt has mild symptoms fever, runny nose and body chills. Advised to stay hydrated and to go to ER if he is SOB or has any chest pains.

## 2025-01-30 NOTE — TELEPHONE ENCOUNTER
Caller: Jose Villafuerte    Relationship: Self    Best call back number: [phone]     What medication are you requesting: PAXLOVID/ANTIBIOTIC    What are your current symptoms: POSITIVE FOR COVID (01/30/25), STUFFY NOSE , FEVER, BODY ACHE, BODY CHILLS   How long have you been experiencing symptoms: 2-3 DAYS     If a prescription is needed, what is your preferred pharmacy and phone number: [facility] [id number] - [address] AT [facility] [phone] [facility] [phone]      Additional notes:

## 2025-02-24 ENCOUNTER — PREP FOR SURGERY (OUTPATIENT)
Dept: SURGERY | Facility: SURGERY CENTER | Age: 75
End: 2025-02-24
Payer: MEDICARE

## 2025-02-24 ENCOUNTER — TELEPHONE (OUTPATIENT)
Dept: PAIN MEDICINE | Facility: CLINIC | Age: 75
End: 2025-02-24

## 2025-02-24 DIAGNOSIS — M54.42 LEFT-SIDED LOW BACK PAIN WITH LEFT-SIDED SCIATICA, UNSPECIFIED CHRONICITY: ICD-10-CM

## 2025-02-24 DIAGNOSIS — M48.062 SPINAL STENOSIS OF LUMBAR REGION WITH NEUROGENIC CLAUDICATION: ICD-10-CM

## 2025-02-24 DIAGNOSIS — M54.16 LUMBAR RADICULOPATHY: Primary | ICD-10-CM

## 2025-02-24 NOTE — TELEPHONE ENCOUNTER
This is the first message I have seen regarding an appointment and an injection. He will need to be seen in office for scheduling.

## 2025-02-24 NOTE — TELEPHONE ENCOUNTER
I think because in January you said follow up as needed for epidural he thinks that he doesn't need another appointment. Does he need to be scheduled and then seen for 30 day before procedure or he needs to discuss procedure.

## 2025-02-24 NOTE — TELEPHONE ENCOUNTER
I placed an order for a left L5 and S1 TF LESI.  Procedure can not be completed until 3/13/2025 or after.  I recommend he keep his office visit on 3/3/2025 as he will need to be seen in office prior to this injection.  Dede Isidro, please see Ana Laura's message above.

## 2025-02-24 NOTE — TELEPHONE ENCOUNTER
Provider:  PAULA RODRIGUEZ    Caller: CHACHA SANDY    Relationship to Patient: SELF      Phone Number: [phone]    Reason for Call:  PATIENT GOT A REMINDER CALL FOR APPT. 3/3/25. PATIENT SAYS HE WAS SUPPOSED TO BE HAVING ANOTHER INJECTION SCHEDULED. NOT AN OFFICE VISIT. PLEASE ADVISE.    When was the patient last seen: 1/23/25

## 2025-03-03 ENCOUNTER — PREP FOR SURGERY (OUTPATIENT)
Dept: OTHER | Facility: HOSPITAL | Age: 75
End: 2025-03-03
Payer: MEDICARE

## 2025-03-03 ENCOUNTER — OFFICE VISIT (OUTPATIENT)
Dept: PAIN MEDICINE | Facility: CLINIC | Age: 75
End: 2025-03-03
Payer: MEDICARE

## 2025-03-03 VITALS
BODY MASS INDEX: 19.84 KG/M2 | SYSTOLIC BLOOD PRESSURE: 146 MMHG | WEIGHT: 126.4 LBS | TEMPERATURE: 97.5 F | OXYGEN SATURATION: 96 % | RESPIRATION RATE: 18 BRPM | DIASTOLIC BLOOD PRESSURE: 66 MMHG | HEIGHT: 67 IN | HEART RATE: 83 BPM

## 2025-03-03 DIAGNOSIS — M46.1 SACROILIITIS: ICD-10-CM

## 2025-03-03 DIAGNOSIS — M48.062 SPINAL STENOSIS OF LUMBAR REGION WITH NEUROGENIC CLAUDICATION: ICD-10-CM

## 2025-03-03 DIAGNOSIS — M54.42 LEFT-SIDED LOW BACK PAIN WITH LEFT-SIDED SCIATICA, UNSPECIFIED CHRONICITY: ICD-10-CM

## 2025-03-03 DIAGNOSIS — M54.16 LUMBAR RADICULOPATHY: Primary | ICD-10-CM

## 2025-03-03 DIAGNOSIS — M79.18 PIRIFORMIS MUSCLE PAIN: ICD-10-CM

## 2025-03-03 PROCEDURE — 99214 OFFICE O/P EST MOD 30 MIN: CPT

## 2025-03-03 PROCEDURE — 3077F SYST BP >= 140 MM HG: CPT

## 2025-03-03 PROCEDURE — 1159F MED LIST DOCD IN RCRD: CPT

## 2025-03-03 PROCEDURE — 1125F AMNT PAIN NOTED PAIN PRSNT: CPT

## 2025-03-03 PROCEDURE — 3078F DIAST BP <80 MM HG: CPT

## 2025-03-03 PROCEDURE — 1160F RVW MEDS BY RX/DR IN RCRD: CPT

## 2025-03-03 RX ORDER — DIAZEPAM 5 MG/1
10 TABLET ORAL ONCE
OUTPATIENT
Start: 2025-03-03

## 2025-03-03 NOTE — PROGRESS NOTES
CHIEF COMPLAINT  Back pain    Subjective   Jose Villafuerte is a 75 y.o. male  who presents for follow-up.  He has a history of chronic low back and left leg pain. Her reports that his pain has worsened since his last office visit. Today pain is 8/10VAS in severity. He complains today of low back pain that radiates into his left buttock, left hip, and down left posterior leg. Denies radicular pain to right leg. Describes this pain as a nearly continuous aching and burning. Pain is worsened by prolonged standing or sitting. Pain improves with rest/reposition, therapeutic lumber TFLESI's, and use of an inversion table.      Previously evaluated by Dr. Mackey in the past who recommended a left L5-S1 laminectomy, foraminotomies, and medial facetectomy with Metrix. Dr. Mackey declined moving forward with operative intervention secondary to patient's history of alcohol abuse.      Dr. Corcoran notes patient is not a good candidate for dorsal column stimulation. Patient wishes to avoid opioid medication and notes that epidurals manage his pain well. Epidural injections every 3-4 months offer significant pain relief and the patient to be more active.      Continues to take Aspirin 81mg for aortic atherosclerosis.      Procedures:  12/13/24 - Left L5 & S1 TF LESI - 50% ongoing relief  8/21/24 - Left S1 TF LESI - 50% relief x 3.5 months  5/7/24 - Left L5 & S1 TF LESI - 70% relief x 1 month, 50% for almost 2 months  10/3/23 - Left L5 & S1 TF LESI - 40% relief x 3 months  7/6/23 - Left L5 & S1 TF TOM - 75% relief x 3 months  12/13/23 - Left SI joint and left piriformis injection - 50% relief x 6 weeks   9/7/22 - Left Piriformis injection - 50% relief x 4 weeks  5/25/22 - Left L2 & L5 TF TOM     Back Pain  This is a chronic problem. The current episode started more than 1 year ago. The problem occurs constantly. The problem has been worse since onset. The pain is present in the lumbar spine, sacro-iliac and gluteal. The quality  "of the pain is described as aching and burning. The pain radiates to the left thigh (Radiates into left buttock and down left posterior leg stopping at the knee). The pain is at a severity of 8/10. The pain is moderate. The symptoms are aggravated by standing, sitting and position (prolonged position). Associated symptoms include weakness. Pertinent negatives include no abdominal pain, chest pain, dysuria, fever, headaches or numbness. He has tried bed rest, heat, ice, walking, NSAIDs and muscle relaxant for the symptoms.     PEG Assessment   What number best describes your pain on average in the past week?7  What number best describes how, during the past week, pain has interfered with your enjoyment of life?6  What number best describes how, during the past week, pain has interfered with your general activity?  6    Review of Pertinent Medical Data ---      The following portions of the patient's history were reviewed and updated as appropriate: allergies, current medications, past family history, past medical history, past social history, past surgical history, and problem list.    Review of Systems   Constitutional:  Negative for fever.   Cardiovascular:  Negative for chest pain.   Gastrointestinal:  Negative for abdominal pain, constipation and diarrhea.   Genitourinary:  Negative for difficulty urinating and dysuria.   Musculoskeletal:  Positive for back pain.   Neurological:  Positive for weakness. Negative for numbness and headaches.   Psychiatric/Behavioral:  Negative for sleep disturbance and suicidal ideas. The patient is not nervous/anxious.      I have reviewed and confirmed the accuracy of the ROS as documented by the MA/LPABEBA/RN JENNIFER Wade    Vitals:    03/03/25 1027   BP: 146/66   Pulse: 83   Resp: 18   Temp: 97.5 °F (36.4 °C)   SpO2: 96%   Weight: 57.3 kg (126 lb 6.4 oz)   Height: 170.2 cm (67\")   PainSc: 8    PainLoc: Back     Objective   Physical Exam  Constitutional:       Appearance: " Normal appearance.   HENT:      Head: Normocephalic.   Cardiovascular:      Rate and Rhythm: Normal rate and regular rhythm.   Pulmonary:      Effort: Pulmonary effort is normal.      Breath sounds: Normal breath sounds.   Musculoskeletal:         General: Normal range of motion.      Cervical back: Normal range of motion.      Lumbar back: Spasms and tenderness present.   Skin:     General: Skin is warm and dry.      Capillary Refill: Capillary refill takes less than 2 seconds.   Neurological:      General: No focal deficit present.      Mental Status: He is alert and oriented to person, place, and time.      Gait: Gait abnormal (slowed).   Psychiatric:         Mood and Affect: Mood normal.         Behavior: Behavior normal.         Thought Content: Thought content normal.         Cognition and Memory: Cognition normal.       Assessment & Plan   Diagnoses and all orders for this visit:    1. Lumbar radiculopathy (Primary)    2. Spinal stenosis of lumbar region with neurogenic claudication    3. Sacroiliitis    4. Piriformis muscle pain    5. Left-sided low back pain with left-sided sciatica, unspecified chronicity      Jose Villafuerte reports a pain score of 8.  Given his pain assessment as noted, treatment options were discussed and the following options were decided upon as a follow-up plan to address the patient's pain: continuation of current treatment plan for pain and steroid injections.    --- Left L5 & S1 TF LESI - to be performed on/after 3/13/25  ---  Indications for epidural injection:  Plan is to proceed with epidural at the appropriate level.  If the patient receives significant pain reduction and improvement in function and the plan will be to repeat the epidural when the pain worsens.  If a second epidural provides at least 6 weeks of sustained improvement that includes both pain reduction and improvement in function then an epidural injection could be repeated once again at the same level.  This  is a mutual decision between the clinician and the patient that includes discussions including risks and benefits in detail as well as alternative therapies.  Patient's questions were answered to their satisfaction and to their understanding.  ---   Discussed with the patient that sedation is optional for this procedure.  The sedation offered is called conscious sedation which is different from general anesthesia that is utilized in surgical procedures. The dosing of the sedation is determined by the physician and they will be monitored throughout the procedure. With conscious sedation it is possible to remember parts or all of the procedure, this is normal. They will need to have a  with them as driving is prohibited following conscious sedation.      NPO instructions for conscious sedation:  --- Do not eat 6 hours prior to the procedure.   --- Do not drink any dairy or citrus 4 hours prior to the procedure.   --- Do not drink anything, including clear liquids, 2 hours prior to procedure.      If the NPO instructions are not followed then the procedure may be performed without sedation or the procedure will need to be rescheduled.    --- Follow-up for procedure - 10 weeks     YUNIEL REPORT  As the clinician, I personally reviewed the YUNIEL from 3/3/25 while the patient was in the office today.    Dictated utilizing Dragon dictation.

## 2025-03-03 NOTE — H&P (VIEW-ONLY)
CHIEF COMPLAINT  Back pain    Subjective   Jose Villafuerte is a 75 y.o. male  who presents for follow-up.  He has a history of chronic low back and left leg pain. Her reports that his pain has worsened since his last office visit. Today pain is 8/10VAS in severity. He complains today of low back pain that radiates into his left buttock, left hip, and down left posterior leg. Denies radicular pain to right leg. Describes this pain as a nearly continuous aching and burning. Pain is worsened by prolonged standing or sitting. Pain improves with rest/reposition, therapeutic lumber TFLESI's, and use of an inversion table.      Previously evaluated by Dr. Mackey in the past who recommended a left L5-S1 laminectomy, foraminotomies, and medial facetectomy with Metrix. Dr. Mackey declined moving forward with operative intervention secondary to patient's history of alcohol abuse.      Dr. Corcoran notes patient is not a good candidate for dorsal column stimulation. Patient wishes to avoid opioid medication and notes that epidurals manage his pain well. Epidural injections every 3-4 months offer significant pain relief and the patient to be more active.      Continues to take Aspirin 81mg for aortic atherosclerosis.      Procedures:  12/13/24 - Left L5 & S1 TF LESI - 50% ongoing relief  8/21/24 - Left S1 TF LESI - 50% relief x 3.5 months  5/7/24 - Left L5 & S1 TF LESI - 70% relief x 1 month, 50% for almost 2 months  10/3/23 - Left L5 & S1 TF LESI - 40% relief x 3 months  7/6/23 - Left L5 & S1 TF TOM - 75% relief x 3 months  12/13/23 - Left SI joint and left piriformis injection - 50% relief x 6 weeks   9/7/22 - Left Piriformis injection - 50% relief x 4 weeks  5/25/22 - Left L2 & L5 TF TOM     Back Pain  This is a chronic problem. The current episode started more than 1 year ago. The problem occurs constantly. The problem has been worse since onset. The pain is present in the lumbar spine, sacro-iliac and gluteal. The quality  "of the pain is described as aching and burning. The pain radiates to the left thigh (Radiates into left buttock and down left posterior leg stopping at the knee). The pain is at a severity of 8/10. The pain is moderate. The symptoms are aggravated by standing, sitting and position (prolonged position). Associated symptoms include weakness. Pertinent negatives include no abdominal pain, chest pain, dysuria, fever, headaches or numbness. He has tried bed rest, heat, ice, walking, NSAIDs and muscle relaxant for the symptoms.     PEG Assessment   What number best describes your pain on average in the past week?7  What number best describes how, during the past week, pain has interfered with your enjoyment of life?6  What number best describes how, during the past week, pain has interfered with your general activity?  6    Review of Pertinent Medical Data ---      The following portions of the patient's history were reviewed and updated as appropriate: allergies, current medications, past family history, past medical history, past social history, past surgical history, and problem list.    Review of Systems   Constitutional:  Negative for fever.   Cardiovascular:  Negative for chest pain.   Gastrointestinal:  Negative for abdominal pain, constipation and diarrhea.   Genitourinary:  Negative for difficulty urinating and dysuria.   Musculoskeletal:  Positive for back pain.   Neurological:  Positive for weakness. Negative for numbness and headaches.   Psychiatric/Behavioral:  Negative for sleep disturbance and suicidal ideas. The patient is not nervous/anxious.      I have reviewed and confirmed the accuracy of the ROS as documented by the MA/LPABEBA/RN JENNIFER Wdae    Vitals:    03/03/25 1027   BP: 146/66   Pulse: 83   Resp: 18   Temp: 97.5 °F (36.4 °C)   SpO2: 96%   Weight: 57.3 kg (126 lb 6.4 oz)   Height: 170.2 cm (67\")   PainSc: 8    PainLoc: Back     Objective   Physical Exam  Constitutional:       Appearance: " Normal appearance.   HENT:      Head: Normocephalic.   Cardiovascular:      Rate and Rhythm: Normal rate and regular rhythm.   Pulmonary:      Effort: Pulmonary effort is normal.      Breath sounds: Normal breath sounds.   Musculoskeletal:         General: Normal range of motion.      Cervical back: Normal range of motion.      Lumbar back: Spasms and tenderness present.   Skin:     General: Skin is warm and dry.      Capillary Refill: Capillary refill takes less than 2 seconds.   Neurological:      General: No focal deficit present.      Mental Status: He is alert and oriented to person, place, and time.      Gait: Gait abnormal (slowed).   Psychiatric:         Mood and Affect: Mood normal.         Behavior: Behavior normal.         Thought Content: Thought content normal.         Cognition and Memory: Cognition normal.       Assessment & Plan   Diagnoses and all orders for this visit:    1. Lumbar radiculopathy (Primary)    2. Spinal stenosis of lumbar region with neurogenic claudication    3. Sacroiliitis    4. Piriformis muscle pain    5. Left-sided low back pain with left-sided sciatica, unspecified chronicity      Jose Villafuerte reports a pain score of 8.  Given his pain assessment as noted, treatment options were discussed and the following options were decided upon as a follow-up plan to address the patient's pain: continuation of current treatment plan for pain and steroid injections.    --- Left L5 & S1 TF LESI - to be performed on/after 3/13/25  ---  Indications for epidural injection:  Plan is to proceed with epidural at the appropriate level.  If the patient receives significant pain reduction and improvement in function and the plan will be to repeat the epidural when the pain worsens.  If a second epidural provides at least 6 weeks of sustained improvement that includes both pain reduction and improvement in function then an epidural injection could be repeated once again at the same level.  This  is a mutual decision between the clinician and the patient that includes discussions including risks and benefits in detail as well as alternative therapies.  Patient's questions were answered to their satisfaction and to their understanding.  ---   Discussed with the patient that sedation is optional for this procedure.  The sedation offered is called conscious sedation which is different from general anesthesia that is utilized in surgical procedures. The dosing of the sedation is determined by the physician and they will be monitored throughout the procedure. With conscious sedation it is possible to remember parts or all of the procedure, this is normal. They will need to have a  with them as driving is prohibited following conscious sedation.      NPO instructions for conscious sedation:  --- Do not eat 6 hours prior to the procedure.   --- Do not drink any dairy or citrus 4 hours prior to the procedure.   --- Do not drink anything, including clear liquids, 2 hours prior to procedure.      If the NPO instructions are not followed then the procedure may be performed without sedation or the procedure will need to be rescheduled.    --- Follow-up for procedure - 10 weeks     YUNIEL REPORT  As the clinician, I personally reviewed the YUNIEL from 3/3/25 while the patient was in the office today.    Dictated utilizing Dragon dictation.

## 2025-03-05 ENCOUNTER — TRANSCRIBE ORDERS (OUTPATIENT)
Dept: SURGERY | Facility: SURGERY CENTER | Age: 75
End: 2025-03-05
Payer: MEDICARE

## 2025-03-05 DIAGNOSIS — Z41.9 SURGERY, ELECTIVE: Primary | ICD-10-CM

## 2025-03-07 ENCOUNTER — TELEPHONE (OUTPATIENT)
Dept: CARDIOLOGY | Facility: CLINIC | Age: 75
End: 2025-03-07
Payer: MEDICARE

## 2025-03-07 NOTE — TELEPHONE ENCOUNTER
03/07/25  15:55 EST  Jose Villafuerte  1950  Home Phone [phone]   Mobile [phone]       Jose Villafuerte will be having Biopsy of his tongue  with Dr Houston on 3/25/2025. They are calling for preop evaluation/ clearance.     Pt is taking Asa. Does anticoagulant need to be held?      Attn:[facility]  Fax number:[phone]  Call back number:[phone]    Thanks  Jenny POON

## 2025-03-19 NOTE — SIGNIFICANT NOTE
Patient educated on the following :    - If you are receiving Sedation for your procedure Nothing to Eat 6 hours and only clear liquids for 2 hours prior to your procedure.     -You will need to have someone drive you home after your PROCEDURE and remain with you for 24 hours after the PROCEDURE  - The date of your procedure, your are welcome to have one visitor at bedside or remain within 10-15 minutes of [facility]  -You will need to arrive at 0945 on 3/20/25 for your PROCEDURE  -Please contact [facility] at: [phone] with any questions and/or concerns

## 2025-03-20 ENCOUNTER — HOSPITAL ENCOUNTER (OUTPATIENT)
Dept: GENERAL RADIOLOGY | Facility: SURGERY CENTER | Age: 75
Setting detail: HOSPITAL OUTPATIENT SURGERY
End: 2025-03-20
Payer: MEDICARE

## 2025-03-20 ENCOUNTER — HOSPITAL ENCOUNTER (OUTPATIENT)
Facility: SURGERY CENTER | Age: 75
Setting detail: HOSPITAL OUTPATIENT SURGERY
Discharge: HOME | End: 2025-03-20
Attending: ANESTHESIOLOGY | Admitting: ANESTHESIOLOGY
Payer: MEDICARE

## 2025-03-20 VITALS
DIASTOLIC BLOOD PRESSURE: 67 MMHG | OXYGEN SATURATION: 95 % | TEMPERATURE: 97.8 F | HEART RATE: 72 BPM | SYSTOLIC BLOOD PRESSURE: 138 MMHG | RESPIRATION RATE: 17 BRPM

## 2025-03-20 DIAGNOSIS — M48.062 SPINAL STENOSIS OF LUMBAR REGION WITH NEUROGENIC CLAUDICATION: ICD-10-CM

## 2025-03-20 DIAGNOSIS — Z41.9 SURGERY, ELECTIVE: ICD-10-CM

## 2025-03-20 DIAGNOSIS — M54.16 LUMBAR RADICULOPATHY: ICD-10-CM

## 2025-03-20 DIAGNOSIS — M54.42 LEFT-SIDED LOW BACK PAIN WITH LEFT-SIDED SCIATICA, UNSPECIFIED CHRONICITY: ICD-10-CM

## 2025-03-20 PROCEDURE — 64483 NJX AA&/STRD TFRM EPI L/S 1: CPT | Performed by: ANESTHESIOLOGY

## 2025-03-20 PROCEDURE — 25010000002 LIDOCAINE PF 1% 1 % SOLUTION 5 ML VIAL: Performed by: ANESTHESIOLOGY

## 2025-03-20 PROCEDURE — 25510000001 IOPAMIDOL 61 % SOLUTION 30 ML VIAL: Performed by: ANESTHESIOLOGY

## 2025-03-20 PROCEDURE — 25010000002 BUPIVACAINE (PF) 0.5 % SOLUTION 10 ML VIAL: Performed by: ANESTHESIOLOGY

## 2025-03-20 PROCEDURE — 76000 FLUOROSCOPY <1 HR PHYS/QHP: CPT

## 2025-03-20 PROCEDURE — 64484 NJX AA&/STRD TFRM EPI L/S EA: CPT | Performed by: ANESTHESIOLOGY

## 2025-03-20 PROCEDURE — 77002 NEEDLE LOCALIZATION BY XRAY: CPT

## 2025-03-20 PROCEDURE — 25010000002 METHYLPREDNISOLONE PER 80 MG: Performed by: ANESTHESIOLOGY

## 2025-03-20 RX ORDER — DIAZEPAM 5 MG/1
10 TABLET ORAL ONCE
Status: COMPLETED | OUTPATIENT
Start: 2025-03-20 | End: 2025-03-20

## 2025-03-20 RX ADMIN — DIAZEPAM 10 MG: 5 TABLET ORAL at 11:15

## 2025-03-20 NOTE — DISCHARGE INSTRUCTIONS
[facility] [place] [facility] - Post-procedure Instructions          --  While there are no absolute restrictions, it is recommended that you do not perform strenuous activity today. In the morning, you may resume your level of activity as before your block.    --  If you have a band-aid at your injection site, please remove it later today. Observe the area for any redness, swelling, pus-like drainage, or a temperature over 101°. If any of these symptoms occur, please call your doctor at [phone]. If after office hours, leave a message and the on-call provider will return your call.    --  Ice may be applied to your injection site. It is recommended you avoid direct heat (heating pad; hot tub) for 1-2 days.    --  Call [facility] [place] at [phone] if you experience persistent headache, persistent bleeding from the injection site, or severe pain not relieved by heat or oral medication.    --  Do not make important decisions today.    --  Due to the effects of the block and/or the I.V. Sedation, DO NOT drive or operate hazardous machinery for 12 hours.  Local anesthetics may cause numbness after procedure and precautions must be taken with regards to operating equipment as well as with walking, even if ambulating with assistance of another person or with an assistive device.    --  Do not drink alcohol for 12 hours.    -- You may return to work tomorrow, or as directed by your referring doctor.    --  Occasionally you may notice a slight increase in your pain after the procedure. This should start to improve within the next 24-48 hours. Radiofrequency ablation procedure pain may last 3-4 weeks.    --  It may take as long as 3-4 days before you notice a gradual improvement in your pain and/or other symptoms.    -- You may continue to take your prescribed pain medication as needed.    --  Some normal possible side effects of steroid use could include fluid retention, increased blood sugar, dull headache,  increased sweating, increased appetite, mood swings and flushing.    --  Diabetics are recommended to watch their blood glucose level closely for 24-48 hours after the injection.    --  Must stay in PACU for 20 min upon arrival and prove no leg weakness before being discharged.    --  IN THE EVENT OF A LIFE THREATENING EMERGENCY, (CHEST PAIN, BREATHING DIFFICULTIES, PARALYSIS…) YOU SHOULD GO TO YOUR NEAREST EMERGENCY ROOM.    --  You should be contacted by our office within 2-3 days to schedule follow up or next appointment date.  If not contacted within 7 days, please call the office at [phone]

## 2025-03-20 NOTE — OP NOTE
LTFESI with S1 TFESI  [facility]    PREOPERATIVE DIAGNOSIS:   Lumbar Spinal Stenosis WITH Neurogenic Claudication and left Lumbar Radiculopathy    POSTOPERATIVE DIAGNOSIS: Same as preop dx    PROCEDURE:    93469 --  Diagnostic  Transforaminal Epidural Steroid Injection at the  Left  L5 Level  01196 -- S1 Transforaminal Epidural Steroid Injection on the Left    PRE-PROCEDURE DISCUSSION WITH PATIENT:    Risks and complications were discussed with the patient prior to starting the procedure and informed consent was obtained.    SURGEON:  Steevn Corcoran MD    REASON FOR PROCEDURE:     Previous clinically significant therapeutic effect is noted., Degenerative changes are noted in the area., Radiating pattern of pain is likely consistent with degenerative changes in the area., and Radicular pain pattern seems consistent with this dermatome.    SEDATION:  The patient had higher than average levels of procedural anxiety and the need to provide additional procedural sedation was needed to safely proceed. and he received oral diazepam 10 mg by mouth in the preoperative area  ANESTHETIC: Marcaine 0.25%  STEROID:     Methylprednisolone (DEPO MEDROL) 80mg/ml  TOTAL VOLUME OF SOLUTION:   4mL    DESCRIPTON OF PROCEDURE:  After obtaining informed consent, an I.V. was not started in the preoperative area. The patient taken to the operating room and was placed in the prone position with a pillow under the abdomen.  All pressure points were well padded.  EKG, blood pressure, and pulse oximeter were monitored.  The lumbosacral area was prepped with Chloraprep and draped in a sterile fashion. Under fluoroscopic guidance in an oblique dimension, the transverse process of the above mentioned Lumbar vertebra at the junction of the body at 6 o'clock position respective to the pedicle on the aforementioned side was identified. Skin and subcutaneous tissue was anesthetized with 2-3mL of 1% lidocaine. A 22-gauge spinal  needle was introduced under fluoroscopic guidance at the above junction into the foramen without parasthesias and into the epidural space. After confirming the position of the needle with PA, lateral, and oblique fluoroscopic views, aspiration was checked and was clear of blood or CSF.  Next, 1 mL of contrast dye was injected. After seeing adequate spread on the corresponding nerve root, a total volume 2 mL of injectate containing half of the previously mentioned local anesthetic solution was slowly and easily injected into the space.    The needle was removed intact.  Vital signs were stable.      Next, the S1 posterior foramen was identified on the above mentioned side with fluoroscopy in a PA dimension, and a spinal needle was introduced to just caudad to the 6 o’clock position of the foramen until contact with os; then the needle was walked off cephalad and into the foramen.   After confirming the position of the needle with PA and lateral fluoroscopic views, aspiration was checked and was clear of blood or CSF.  Next, 1 mL of contrast dye was injected. After seeing adequate spread on the S1 nerve root and into the caudal epidural space, a total volume 2mL of injectate containing the other half of the local anesthetic solution was easily and slowly injected into the epidural space.   The needle was removed intact.  Vital signs were stable.  Onset of analgesia was noted prior to transport to the recovery area.      ESTIMATED BLOOD LOSS:  <5 mL  SPECIMENS:  none    COMPLICATIONS:   No complications were noted., There was no indication of vascular uptake on live injection of contrast dye., There was no indication of intrathecal uptake on live injection of contrast dye., There was not any evidence of dural puncture.  , and The patient did not have any signs of postprocedure numbness nor weakness.    TOLERANCE & DISCHARGE CONDITION:    The patient tolerated the procedure well.  The patient was transported to the  recovery area without difficulties.  The patient was discharged to home under the care of family in stable and satisfactory condition.    PLAN OF CARE:  The patient was given our standard instruction sheet.  The patient will Return to clinic in 8 wks  The patient will resume all medications as per the medication reconciliation sheet.

## 2025-04-18 ENCOUNTER — OFFICE VISIT (OUTPATIENT)
Dept: INTERNAL MEDICINE | Facility: CLINIC | Age: 75
End: 2025-04-18
Payer: MEDICARE

## 2025-04-18 VITALS
DIASTOLIC BLOOD PRESSURE: 78 MMHG | HEIGHT: 67 IN | OXYGEN SATURATION: 96 % | BODY MASS INDEX: 19.62 KG/M2 | HEART RATE: 87 BPM | WEIGHT: 125 LBS | SYSTOLIC BLOOD PRESSURE: 122 MMHG

## 2025-04-18 DIAGNOSIS — F17.210 CIGARETTE NICOTINE DEPENDENCE WITHOUT COMPLICATION: Chronic | ICD-10-CM

## 2025-04-18 DIAGNOSIS — F10.20 UNCOMPLICATED ALCOHOL DEPENDENCE: Chronic | ICD-10-CM

## 2025-04-18 DIAGNOSIS — Z00.00 MEDICARE ANNUAL WELLNESS VISIT, SUBSEQUENT: Primary | ICD-10-CM

## 2025-04-18 DIAGNOSIS — M48.062 SPINAL STENOSIS OF LUMBAR REGION WITH NEUROGENIC CLAUDICATION: Chronic | ICD-10-CM

## 2025-04-18 DIAGNOSIS — I10 ESSENTIAL HYPERTENSION: Chronic | ICD-10-CM

## 2025-04-18 DIAGNOSIS — J43.1 PANLOBULAR EMPHYSEMA: Chronic | ICD-10-CM

## 2025-04-18 DIAGNOSIS — I25.10 CORONARY ARTERY CALCIFICATION SEEN ON CAT SCAN: ICD-10-CM

## 2025-04-18 DIAGNOSIS — J39.2 OROPHARYNGEAL MASS: ICD-10-CM

## 2025-04-18 DIAGNOSIS — E78.00 HYPERCHOLESTEROLEMIA: ICD-10-CM

## 2025-04-18 NOTE — PROGRESS NOTES
The ABCs of the Annual Wellness Visit  Subsequent Medicare Wellness Visit    Subjective    Jose Villafuerte is a 75 y.o. male who presents for a Subsequent Medicare Wellness Visit.    The following portions of the patient's history were reviewed and   updated as appropriate: allergies, current medications, past family history, past medical history, past social history, past surgical history, and problem list.    Wt Readings from Last 4 Encounters:   04/18/25 56.7 kg (125 lb)   03/03/25 57.3 kg (126 lb 6.4 oz)   01/23/25 56.7 kg (125 lb)   11/25/24 59.1 kg (130 lb 3.2 oz)       Weight trend is stable.    His cardiovascular risks are:    [] No Known risk factors  [x] Known CAD and being treated     [] Hypertension    [] Hyperlipidemia  [] Diabetes     [] Obesity  [] Family history    [] Current or hx tobacco use  [] Sedentary lifestyle       Male:   [] Testosterone use     Mobility    [x] Ambulates independently  [] Ambulates with cane   [] Ambulates with quad cane  [] Ambulates with rollator   [] Ambulates with walker   [] Mobile with wheelchair   [] Mobile with electric wheelchair     Continence    [x] Continent of bowel and bladder  [] Incontinent bowel and bladder   [] Incontinent bladder   [] Incontinent bowel      Social   Single: has GF of 22 years     Compared to one year ago, the patient feels his physical   health is the same.    Compared to one year ago, the patient feels his mental   health is the same.    Recent Hospitalizations:  He was not admitted to the hospital during the last year.       Current Medical Providers:  Patient Care Team:  Esa Stewart III NP-LETY as PCP - General (Internal Medicine)  Steven Corcoran MD as Consulting Physician (Pain Medicine)    Outpatient Medications Prior to Visit   Medication Sig Dispense Refill    amLODIPine (NORVASC) 10 MG tablet Take 1 tablet by mouth Daily. 90 tablet 1    aspirin 81 MG EC tablet Take 1 tablet by mouth Daily. 90 tablet 3    B  Complex Vitamins (B COMPLEX 1 PO) Take 1 tablet by mouth. TAKES EVERY OTHER DAY      ferrous sulfate 324 (65 Fe) MG tablet delayed-release EC tablet Take 1 tablet by mouth Every Other Day.      lisinopril (PRINIVIL,ZESTRIL) 40 MG tablet Take 1 tablet by mouth Daily. 90 tablet 1    Multiple Vitamins-Minerals (ZINC PO) Take 1 tablet by mouth Every Other Day.      Thiamine HCl (VITAMIN B-1 PO) Take 1 tablet by mouth Daily.      atorvastatin (LIPITOR) 20 MG tablet Take 1 tablet by mouth Daily. (Patient not taking: Reported on 4/18/2025) 90 tablet 3    albuterol sulfate  (90 Base) MCG/ACT inhaler Inhale 2 puffs Every 4 (Four) Hours As Needed for Wheezing. (Patient not taking: Reported on 4/18/2025) 8 g 3    Fexofenadine HCl (ALLERGY 24-HR PO) Take  by mouth. (Patient not taking: Reported on 4/18/2025)      tiotropium (Spiriva HandiHaler) 18 MCG per inhalation capsule Place 1 capsule into inhaler and inhale Daily. (Patient not taking: Reported on 4/18/2025) 30 capsule 3     No facility-administered medications prior to visit.       No opioid medication identified on active medication list. I have reviewed chart for other potential  high risk medication/s and harmful drug interactions in the elderly.        Aspirin is on active medication list. Aspirin use is indicated based on review of current medical condition/s. Pros and cons of this therapy have been discussed today. Benefits of this medication outweigh potential harm.  Patient has been encouraged to continue taking this medication.  .      Patient Active Problem List   Diagnosis    Uncomplicated alcohol dependence    Cigarette nicotine dependence without complication    Spinal stenosis of lumbar region with neurogenic claudication    Essential hypertension    Other chronic pain    Piriformis muscle pain    Panlobular emphysema    Lumbar radiculopathy    Back pain    Sacroiliitis    Pulmonary nodules    Coronary artery calcification seen on CAT scan    Aortic  "atherosclerosis    Colon polyps    Hypercholesterolemia    Oropharyngeal mass     Advance Care Planning  (Click this link to access ACP Navigator)      Advance Directive is on file.  ACP discussion was held with the patient during this visit. Patient has an advance directive in EMR which is still valid.      Objective    Vitals:    25 1034   BP: 122/78   BP Location: Right arm   Patient Position: Sitting   Cuff Size: Adult   Pulse: 87   SpO2: 96%   Weight: 56.7 kg (125 lb)   Height: 170.2 cm (67\")   PainSc: 7    PainLoc: Back     Estimated body mass index is 19.58 kg/m² as calculated from the following:    Height as of this encounter: 170.2 cm (67\").    Weight as of this encounter: 56.7 kg (125 lb).    BMI is within normal parameters. No other follow-up for BMI required.      [facility]  Gait and Balance Evaluation:  Normal    Does the patient have evidence of cognitive impairment? No          HEALTH RISK ASSESSMENT    Smoking Status:  Social History     Tobacco Use   Smoking Status Every Day    Current packs/day: 2.00    Average packs/day: 2.0 packs/day for 45.3 years (90.6 ttl pk-yrs)    Types: Cigarettes    Start date:    Smokeless Tobacco Never     Alcohol Consumption:  Social History     Substance and Sexual Activity   Alcohol Use Yes    Alcohol/week: 55.0 standard drinks of alcohol    Types: 55 Cans of beer per week    Comment: Daily-9 beers and trying to decrease     Fall Risk Screen:    [facility], and patient is at LOW risk for falls.Assessment completed on:2025    Depression Screenin/18/2025    10:35 AM   PHQ-2/PHQ-9 Depression Screening   Little interest or pleasure in doing things Not at all   Feeling down, depressed, or hopeless Not at all       Health Habits and Functional and Cognitive Screenin/18/2025    10:35 AM   Functional & Cognitive Status   Do you have difficulty preparing food and eating? No   Do you have " difficulty bathing yourself, getting dressed or grooming yourself? No   Do you have difficulty using the toilet? No   Do you have difficulty moving around from place to place? No   Do you have trouble with steps or getting out of a bed or a chair? No   Current Diet Well Balanced Diet   Dental Exam Up to date   Eye Exam Up to date   Exercise (times per week) 1 times per week   Current Exercises Include Walking   Do you need help using the phone?  No   Are you deaf or do you have serious difficulty hearing?  No   Do you need help to go to places out of walking distance? Yes   Do you need help shopping? No   Do you need help preparing meals?  No   Do you need help with housework?  No   Do you need help with laundry? No   Do you need help taking your medications? No   Do you need help managing money? No   Do you ever drive or ride in a car without wearing a seat belt? No   Have you felt unusual stress, anger or loneliness in the last month? No   Who do you live with? Other   If you need help, do you have trouble finding someone available to you? No   Have you been bothered in the last four weeks by sexual problems? No   Do you have difficulty concentrating, remembering or making decisions? No       Age-appropriate Screening Schedule:  Refer to the list below for future screening recommendations based on patient's age, sex and/or medical conditions. Orders for these recommended tests are listed in the plan section. The patient has been provided with a written plan.    Health Maintenance   Topic Date Due    ZOSTER VACCINE (2 of 3) 09/13/2013    Pneumococcal Vaccine 50+ (3 of 3 - PCV20 or PCV21) 03/14/2021    LIPID PANEL  11/10/2024    LUNG CANCER SCREENING  04/18/2026 (Originally 4/13/2025)    COVID-19 Vaccine (7 - 2024-25 season) 08/14/2025    ANNUAL WELLNESS VISIT  04/18/2026    COLORECTAL CANCER SCREENING  09/25/2027    TDAP/TD VACCINES (2 - Td or Tdap) 10/14/2031    HEPATITIS C SCREENING  Completed    RSV Vaccine -  Adults  Completed    AAA SCREEN ONCE  Completed    INFLUENZA VACCINE  Discontinued                CMS Preventative Services Quick Reference  Risk Factors Identified During Encounter  Alcohol Misuse: Patient encouraged to limit alcohol use to no more than 1 standard alcoholic beverage per day. (12 ounce beer, 6 ounce wine, one shot liquor), declines vaccines      The above risks/problems have been discussed with the patient.  Pertinent information has been shared with the patient in the After Visit Summary.  An After Visit Summary and PPPS were made available to the patient.    Follow Up:   Next Medicare Wellness visit to be scheduled in 1 year.       Additional E&M Note during same encounter follows:  Patient has multiple medical problems which are significant and separately identifiable that require additional work above and beyond the Medicare Wellness Visit.      Chief Complaint  Medicare Wellness-subsequent    Subjective        Jose Villafuerte is also being seen today for AWV and follow up chronic conditions: hypertension, CAD (per CT), chronic LBP, hx skin cancer, alcohol abuse      He was new to me last October.     Since last visit: he had been having throat pain and difficulty swallowing.   Referred to ENT, Dr Sandoval: 3/6/25: found neoplasm on tongue.   Bx: + SCC, HPV negative   He was seen by oncology: Dr St on 4/11/25: getting PET scan tomorrow     Hypertension  This is a chronic problem. The current episode started more than 1 year ago. The problem is controlled. Risk factors for coronary artery disease include smoking/tobacco exposure and male gender (known CAD). Current antihypertension treatment includes ACE inhibitors and calcium channel blockers. Hypertensive end-organ damage includes CAD/MI.      Atherosclerosis on lung cancer screen.   CT Chest Low Dose Cancer Screening WO (04/13/2024 10:56 AM)   Heavy coronary artery calcification   sub-6 mm micronodules   Started ASA 81 mg   States uses  "treadmill at home. No CP      Seen by cardiology: 10/23/24: Advised statin and ASA   Stress test: 11/11/24: normal     He has not been taking his statin.  States he didn't know what it was before.  Does have the bottle at home.  States he will start.     COPD  Smoking:   Current 2 1/2 PPD   Referred to pulm.   He states was given 2 inhalers but made him sick - stopped and doesn't want to take any further.      AAA Screen Normal  US aaa screen limited (05/26/2021 11:00 AM)      Spinal stenosis: lumbar   Followed by pain management / also has been seen by Dr Mackey   Left S1 TF LESI on  8/21/24   States injections last 2-3 months for pain control.      Inversion table at home.      CRC screen: 9/25/24: [facility]   1.  Cecal polyp biopsy:  A. Tubular adenoma.  B. No high-grade dysplasia nor malignancy identified.     2.  Rectal polyps biopsy:  A. Fragments of hyperplastic polyp and developing hyperplastic polyp.  B. No glandular dysplasia nor malignancy identified.     Repeat: 3 years (about 9/25/2027)     Alcohol: 9-10 beers throughout the day (starts in am)   States doesn't get drunk - enjoys beer.        Wishes to see podiatry: corns on toes      : 1 son lives here in [place].          Objective   Vital Signs:  /78 (BP Location: Right arm, Patient Position: Sitting, Cuff Size: Adult)   Pulse 87   Ht 170.2 cm (67\")   Wt 56.7 kg (125 lb)   SpO2 96%   BMI 19.58 kg/m²     Physical Exam  Constitutional:       Appearance: He is ill-appearing (chronic).   Cardiovascular:      Rate and Rhythm: Normal rate and regular rhythm.      Pulses: Normal pulses.      Heart sounds: Normal heart sounds.   Pulmonary:      Effort: Pulmonary effort is normal.   Abdominal:      General: Bowel sounds are normal.   Neurological:      Mental Status: He is oriented to person, place, and time.          The following data was reviewed by: Esa Stewart III, NP-C on 04/18/2025:            Labs done at [facility]: " "4/11/25  Hgb 13.9, ferritin 286,  Iron sat 32%, Na 129, Cr 0.44       Assessment and Plan     Problem List Items Addressed This Visit          Cardiac and Vasculature    Essential hypertension (Chronic)    Current Assessment & Plan   Hypertension is stable and controlled  Continue current treatment regimen.  Dietary sodium restriction.  Stop smoking.  Blood pressure will be reassessed in 6 months.    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             Mental Health    Uncomplicated alcohol dependence (Chronic)    Current Assessment & Plan   Advised to stop drinking   My concern is for some withdraw effects if he isn't able to drink as much following radiation    He understands and states he will reduce             Neuro    Spinal stenosis of lumbar region with neurogenic claudication (Chronic)       Pulmonary and Pneumonias    Panlobular emphysema (Chronic)    Current Assessment & Plan   Tried spiriva, albuterol - stated it was worse than covid  Does not want to try anything else.     Aware of risks of long term smoking - states \"hasn't bothered me before\"               Tobacco    Cigarette nicotine dependence without complication (Chronic)    Overview   2 1/2 PPD     Declines cessation: aware of risks           Other Visit Diagnoses         Medicare annual wellness visit, subsequent    -  Primary               Discussed my concern is his low weight and needs to increase caloric intake if about to start treatment for throat CA.                Follow Up     Return in about 6 months (around " 10/18/2025).    Patient was given instructions and counseling regarding his condition or for health maintenance advice. Please see specific information pulled into the AVS if appropriate.

## 2025-04-18 NOTE — ASSESSMENT & PLAN NOTE
"Tried spiriva, albuterol - stated it was worse than covid  Does not want to try anything else.     Aware of risks of long term smoking - states \"hasn't bothered me before\"     "

## 2025-04-18 NOTE — PATIENT INSTRUCTIONS
Medicare Wellness  Personal Prevention Plan of Service     Date of Office Visit:    Encounter Provider:  Esa Stewart III, NP-C  Place of Service:  [facility]  Patient Name: Jose Villafuerte  :  1950    As part of the Medicare Wellness portion of your visit today, we are providing you with this personalized preventive plan of services (PPPS). This plan is based upon recommendations of the [place] Preventive Services Task Force (USPSTF) and the Advisory Committee on Immunization Practices (ACIP).    This lists the preventive care services that should be considered, and provides dates of when you are due. Items listed as completed are up-to-date and do not require any further intervention.    Health Maintenance   Topic Date Due    ZOSTER VACCINE (2 of 3) 2013    Pneumococcal Vaccine 50+ (3 of 3 - PCV20 or PCV21) 2021    LIPID PANEL  11/10/2024    LUNG CANCER SCREENING  2026 (Originally 2025)    COVID-19 Vaccine ( - - season) 2025    ANNUAL WELLNESS VISIT  2026    COLORECTAL CANCER SCREENING  2027    TDAP/TD VACCINES (2 - Td or Tdap) 10/14/2031    HEPATITIS C SCREENING  Completed    RSV Vaccine - Adults  Completed    AAA SCREEN ONCE  Completed    INFLUENZA VACCINE  Discontinued       No orders of the defined types were placed in this encounter.      Return in about 6 months (around 10/18/2025).

## 2025-04-18 NOTE — ASSESSMENT & PLAN NOTE
Advised to stop drinking   My concern is for some withdraw effects if he isn't able to drink as much following radiation    He understands and states he will reduce

## 2025-04-28 ENCOUNTER — TELEPHONE (OUTPATIENT)
Dept: PAIN MEDICINE | Facility: CLINIC | Age: 75
End: 2025-04-28

## 2025-04-28 NOTE — TELEPHONE ENCOUNTER
Provider: JAMES    Caller: Jose Villafuerte    Relationship to Patient: Self    Phone Number: [phone]*    Reason for Call: PT CALLED TO CANCEL 5.29.25 APPT DUE TO DX WITH CANCER, GOING THROUGH RADIATION/CHEMO. PT WANTS TO HOLD OFF ON APPT. PLEASE CALL WITH ANY QUESTIONS.

## 2025-04-29 NOTE — TELEPHONE ENCOUNTER
I hate to hear that he is going through this. I am happy to see him again once he is able to reschedule.

## 2025-05-21 ENCOUNTER — TELEPHONE (OUTPATIENT)
Dept: INTERNAL MEDICINE | Facility: CLINIC | Age: 75
End: 2025-05-21
Payer: MEDICARE

## 2025-05-21 NOTE — TELEPHONE ENCOUNTER
Patient states he is wanting speech therapy for his cancer. He is going through chemo and would like it through [facility] .     Speech Theraptist    Vibha georges.

## 2025-05-21 NOTE — TELEPHONE ENCOUNTER
Caller: Jose Villafuerte    Relationship: Self    Best call back number:     What is the medical concern/diagnosis: THROAT CANCER PATIENT    What specialty or service is being requested: SPEECH THERAPY    What is the provider, practice or medical service name:     What is the office location:     What is the office phone number:     Any additional details: PATIENT SAYS THAT HE HAS THROAT CANCER AND IS CALLING IN TO ASK IF DR LAM CAN REFER HIM TO A SPEECH THERAPIST IN THE [facility].

## 2025-05-22 NOTE — TELEPHONE ENCOUNTER
RELAY      have reviewed his notes - it looks like he saw Dr Gonzalez today.      Did he discuss speech therapy (ST) with him?   I could not see it mentioned in the note.      I am asking because it looks as though he is about to start radiation treatment.   I believe he may be better suited for ST after he completes radiation.         Let me know what he'd like to do and or discuss with radiation oncology for timing of when he should start ST.      DONYA               Also - looks like he needs a follow up appt with me.  Make it in about 3 months, 30 mins  for follow up CA.

## 2025-05-22 NOTE — TELEPHONE ENCOUNTER
I have reviewed his notes - it looks like he saw Dr Gonzalez today.     Did he discuss speech therapy (ST) with him?   I could not see it mentioned in the note.     I am asking because it looks as though he is about to start radiation treatment.   I believe he may be better suited for ST after he completes radiation.        Let me know what he'd like to do and or discuss with radiation oncology for timing of when he should start ST.     DONYA               Also - looks like he needs a follow up appt with me.  Make it in about 3 months, 30 mins  for follow up CA.

## 2025-05-22 NOTE — TELEPHONE ENCOUNTER
didn't discuss speech therapy. He has no problem with waiting until radiation for ST. Last radiation is in June. Would like to go somewhere in [facility] if possible.

## 2025-05-22 NOTE — TELEPHONE ENCOUNTER
Name: Jose Villafuerte      Relationship: Self      Best Callback Number: [phone]      HUB PROVIDED THE RELAY MESSAGE FROM THE OFFICE      PATIENT: HAS FURTHER QUESTIONS AND WOULD LIKE A CALL BACK AT THE FOLLOWING PHONE [phone]    ADDITIONAL INFORMATION:PATIENT STATES HE WILL NOT GO BACK TO SPEECH THERAPY [place], HE WILL NOT GO BACK INTO THAT BUILDING. HUB UNABLE TO SCHEDULE 30 MINUTE APPOINTMENTS AS PROVIDER INDICATED.   PLEASE CALL TO SCHEDULE

## 2025-06-13 DIAGNOSIS — I10 ESSENTIAL HYPERTENSION: Chronic | ICD-10-CM

## 2025-06-16 RX ORDER — LISINOPRIL 40 MG/1
40 TABLET ORAL DAILY
Qty: 90 TABLET | Refills: 1 | Status: SHIPPED | OUTPATIENT
Start: 2025-06-16

## 2025-06-16 RX ORDER — AMLODIPINE BESYLATE 10 MG/1
10 TABLET ORAL DAILY
Qty: 90 TABLET | Refills: 0 | Status: SHIPPED | OUTPATIENT
Start: 2025-06-16

## 2025-06-16 NOTE — TELEPHONE ENCOUNTER
Rx Refill Note  Requested Prescriptions     Pending Prescriptions Disp Refills    amLODIPine (NORVASC) 10 MG tablet [Pharmacy Med Name: amLODIPine BESYLATE 10MG TAB] 90 tablet 1     Sig: TAKE 1 TABLET BY MOUTH DAILY    lisinopril (PRINIVIL,ZESTRIL) 40 MG tablet [Pharmacy Med Name: LISINOPRIL 40 MG TABLET] 90 tablet 1     Sig: TAKE 1 TABLET BY MOUTH DAILY      Last office visit with prescribing clinician: 4/18/2025  Next office visit with prescribing clinician: 8/22/2025         Alisia Mccann MA  06/16/25, 08:33 EDT

## 2025-07-01 ENCOUNTER — TELEPHONE (OUTPATIENT)
Dept: INTERNAL MEDICINE | Facility: CLINIC | Age: 75
End: 2025-07-01
Payer: MEDICARE

## 2025-07-01 ENCOUNTER — TELEPHONE (OUTPATIENT)
Dept: INTERNAL MEDICINE | Facility: CLINIC | Age: 75
End: 2025-07-01

## 2025-07-01 NOTE — TELEPHONE ENCOUNTER
Caller: Jose Villafuerte    Relationship: Self    Best call back number: [phone]     What test was performed: PET SCAN    When was the test performed: AT LEAST 7 WEEKS AGO     Where was the test performed: [facility]    Additional notes: PATIENT STATES HE WOULD LIKE TO DISCUSS RESULTS

## 2025-07-01 NOTE — TELEPHONE ENCOUNTER
Please advise.     Patient would like a referral for a speech theraptist at [facility].     See telephone message- 5/21

## 2025-07-01 NOTE — TELEPHONE ENCOUNTER
Caller: Jose Villafuerte    Relationship: Self    Best call back number: [phone]     What is the medical concern/diagnosis: JUST FINISHED RADIATION TREATMENT       What specialty or service is being requested: SPEECH SPECIALIST     What is the provider, practice or medical service name: [facility]     What is the office location: [facility]

## 2025-07-07 NOTE — TELEPHONE ENCOUNTER
Please call him -   I have not seen him since his treatment.  It looks like he has a follow up with Dr St on Thursday.   Review of his notes - he has feeding tube placed in May.     I would like him to discuss with Dr St if ok to have speech therapy - he may need swallow study before.  Also, they should be able to discuss his PET scan at that visit.  I am not able to review it.     DONYA

## 2025-07-09 NOTE — TELEPHONE ENCOUNTER
HUB TO RELAY.     Please call him -   I have not seen him since his treatment.  It looks like he has a follow up with Dr St on Thursday.   Review of his notes - he has feeding tube placed in May.      I would like him to discuss with Dr St if ok to have speech therapy - he may need swallow study before.  Also, they should be able to discuss his PET scan at that visit.  I am not able to review it.      DONYA

## 2025-07-29 ENCOUNTER — TELEPHONE (OUTPATIENT)
Dept: INTERNAL MEDICINE | Facility: CLINIC | Age: 75
End: 2025-07-29
Payer: MEDICARE

## 2025-07-29 NOTE — TELEPHONE ENCOUNTER
Pt would like for JENNIFER Castaneda to order the PET Scan that was discussed in an office visit.  Pt would still like for Alisia to return his call.

## 2025-08-22 ENCOUNTER — OFFICE VISIT (OUTPATIENT)
Dept: INTERNAL MEDICINE | Facility: CLINIC | Age: 75
End: 2025-08-22
Payer: MEDICARE

## 2025-08-22 VITALS
WEIGHT: 126.5 LBS | HEIGHT: 67 IN | OXYGEN SATURATION: 97 % | DIASTOLIC BLOOD PRESSURE: 78 MMHG | BODY MASS INDEX: 19.85 KG/M2 | SYSTOLIC BLOOD PRESSURE: 128 MMHG | HEART RATE: 68 BPM

## 2025-08-22 DIAGNOSIS — F17.210 CIGARETTE NICOTINE DEPENDENCE WITHOUT COMPLICATION: Chronic | ICD-10-CM

## 2025-08-22 DIAGNOSIS — E78.00 HYPERCHOLESTEROLEMIA: ICD-10-CM

## 2025-08-22 DIAGNOSIS — I25.10 CORONARY ARTERY CALCIFICATION SEEN ON CAT SCAN: ICD-10-CM

## 2025-08-22 DIAGNOSIS — I10 ESSENTIAL HYPERTENSION: Primary | Chronic | ICD-10-CM

## 2025-08-22 DIAGNOSIS — C76.0 HEAD AND NECK CANCER: ICD-10-CM

## 2025-08-22 DIAGNOSIS — Z23 NEED FOR PNEUMOCOCCAL VACCINATION: ICD-10-CM

## 2025-08-22 DIAGNOSIS — J43.1 PANLOBULAR EMPHYSEMA: Chronic | ICD-10-CM

## 2025-08-22 PROBLEM — D50.9 IRON DEFICIENCY ANEMIA: Status: ACTIVE | Noted: 2025-06-20

## 2025-08-22 LAB
CHOLEST SERPL-MCNC: 130 MG/DL (ref 0–200)
HDLC SERPL-MCNC: 60 MG/DL (ref 40–60)
LDLC SERPL CALC-MCNC: 53 MG/DL (ref 0–100)
LDLC/HDLC SERPL: 0.86 {RATIO}
TRIGL SERPL-MCNC: 92 MG/DL (ref 0–150)
VLDLC SERPL CALC-MCNC: 17 MG/DL (ref 5–40)

## 2025-08-22 PROCEDURE — 1160F RVW MEDS BY RX/DR IN RCRD: CPT | Performed by: NURSE PRACTITIONER

## 2025-08-22 PROCEDURE — 3074F SYST BP LT 130 MM HG: CPT | Performed by: NURSE PRACTITIONER

## 2025-08-22 PROCEDURE — 1159F MED LIST DOCD IN RCRD: CPT | Performed by: NURSE PRACTITIONER

## 2025-08-22 PROCEDURE — 90684 PCV21 VACCINE IM: CPT | Performed by: NURSE PRACTITIONER

## 2025-08-22 PROCEDURE — 1125F AMNT PAIN NOTED PAIN PRSNT: CPT | Performed by: NURSE PRACTITIONER

## 2025-08-22 PROCEDURE — 99214 OFFICE O/P EST MOD 30 MIN: CPT | Performed by: NURSE PRACTITIONER

## 2025-08-22 PROCEDURE — 3078F DIAST BP <80 MM HG: CPT | Performed by: NURSE PRACTITIONER

## 2025-08-22 PROCEDURE — G0009 ADMIN PNEUMOCOCCAL VACCINE: HCPCS | Performed by: NURSE PRACTITIONER

## 2025-08-22 RX ORDER — ATORVASTATIN CALCIUM 20 MG/1
20 TABLET, FILM COATED ORAL DAILY
Qty: 90 TABLET | Refills: 3 | Status: SHIPPED | OUTPATIENT
Start: 2025-08-22

## 2025-08-22 RX ORDER — LISINOPRIL 40 MG/1
40 TABLET ORAL DAILY
Qty: 90 TABLET | Refills: 1 | Status: SHIPPED | OUTPATIENT
Start: 2025-08-22

## 2025-08-22 RX ORDER — AMLODIPINE BESYLATE 10 MG/1
10 TABLET ORAL DAILY
Qty: 90 TABLET | Refills: 1 | Status: SHIPPED | OUTPATIENT
Start: 2025-08-22

## 2025-08-22 RX ORDER — FLUTICASONE PROPIONATE AND SALMETEROL 250; 50 UG/1; UG/1
1 POWDER RESPIRATORY (INHALATION)
Qty: 60 EACH | Refills: 5 | Status: SHIPPED | OUTPATIENT
Start: 2025-08-22

## 2025-08-25 PROBLEM — E78.00 HYPERCHOLESTEROLEMIA: Chronic | Status: ACTIVE | Noted: 2024-10-23

## 2025-08-25 PROBLEM — C76.0 HEAD AND NECK CANCER: Chronic | Status: ACTIVE | Noted: 2025-04-21

## 2025-08-27 ENCOUNTER — TELEPHONE (OUTPATIENT)
Dept: INTERNAL MEDICINE | Facility: CLINIC | Age: 75
End: 2025-08-27
Payer: MEDICARE

## (undated) DEVICE — KT ORCA ORCAPOD DISP STRL

## (undated) DEVICE — GLV SURG TRIUMPH PF LTX 7.5 STRL

## (undated) DEVICE — EPIDURAL TRAY: Brand: MEDLINE INDUSTRIES, INC.

## (undated) DEVICE — ANTIBACTERIAL UNDYED BRAIDED (POLYGLACTIN 910), SYNTHETIC ABSORBABLE SUTURE: Brand: COATED VICRYL

## (undated) DEVICE — NDL SPINE 22G 31/2IN BLK

## (undated) DEVICE — Device: Brand: PORTEX

## (undated) DEVICE — GLV SURG BIOGEL LTX PF 7

## (undated) DEVICE — CODMAN® SURGICAL PATTIES 3/4" X 3/4" (1.91CM X 1.91CM): Brand: CODMAN®

## (undated) DEVICE — NEEDLE, QUINCKE 22GX3.5": Brand: MEDLINE INDUSTRIES, INC.

## (undated) DEVICE — CANN NASL CO2 TRULINK W/O2 A/

## (undated) DEVICE — VIAL FORMLN CAP 10PCT 20ML

## (undated) DEVICE — ADHS SKIN DERMABOND TOP ADVANCED

## (undated) DEVICE — NDL SPINE 22G 5IN BLK

## (undated) DEVICE — GLV SURG SENSICARE W/ALOE PF LF 7.5 STRL

## (undated) DEVICE — FLEX ADVANTAGE 1500CC: Brand: FLEX ADVANTAGE

## (undated) DEVICE — SINGLE-USE POLYPECTOMY SNARE: Brand: SENSATION SHORT THROW

## (undated) DEVICE — THE SINGLE USE ETRAP – POLYP TRAP IS USED FOR SUCTION RETRIEVAL OF ENDOSCOPICALLY REMOVED POLYPS.: Brand: ETRAP

## (undated) DEVICE — SINGLE-USE BIOPSY FORCEPS: Brand: RADIAL JAW 4

## (undated) DEVICE — SYR CONTRL LUERLOK 10CC

## (undated) DEVICE — 6.0MM PRECISION ROUND

## (undated) DEVICE — Device

## (undated) DEVICE — CANN O2 ETCO2 FITS ALL CONN CO2 SMPL A/ 7IN DISP LF

## (undated) DEVICE — AIRLIFE™ UNIVERSAL OXYGEN NUT AND NIPPLE CONNECTION: Brand: AIRLIFE™

## (undated) DEVICE — SMOKE EVACUATION TUBING WITH 7/8 IN TO 1/4 IN REDUCER: Brand: BUFFALO FILTER

## (undated) DEVICE — SPNG GZ WOVN 4X4IN 12PLY 10/BX STRL

## (undated) DEVICE — NDL SPINE 20G 3 1/2 YEL STRL 1P/U

## (undated) DEVICE — CONN TBG Y 5 IN 1 LF STRL

## (undated) DEVICE — GOWN PROC ENDOARMOR GI LVL3 HY/SHLD UNIV

## (undated) DEVICE — PK NEURO SPINE 40

## (undated) DEVICE — NDL EPID TUOHY W/WINGS 20G 3.5IN

## (undated) DEVICE — SOL NACL 0.9PCT 100ML SGL

## (undated) DEVICE — ADAPT CLN SCPE ENDO PORPOISE BX/50 DISP

## (undated) DEVICE — DRP MICROSCOPE 4 BINOCULAR CV 54X150IN

## (undated) DEVICE — APPL CHLORAPREP W/TINT 26ML ORNG

## (undated) DEVICE — UNDYED BRAIDED (POLYGLACTIN 910), SYNTHETIC ABSORBABLE SUTURE: Brand: COATED VICRYL